# Patient Record
Sex: FEMALE | Race: BLACK OR AFRICAN AMERICAN | Employment: OTHER | URBAN - METROPOLITAN AREA
[De-identification: names, ages, dates, MRNs, and addresses within clinical notes are randomized per-mention and may not be internally consistent; named-entity substitution may affect disease eponyms.]

---

## 2017-01-12 ENCOUNTER — TELEPHONE (OUTPATIENT)
Dept: FAMILY MEDICINE CLINIC | Age: 62
End: 2017-01-12

## 2017-01-12 NOTE — TELEPHONE ENCOUNTER
Patient called to inquire of name of Foot Doctor she was referred to , relayed information below      Procedure Information   Procedure Modifiers Provider Requested Approved   Osorio Singer - REFERRAL TO PODIATRY  Hayder Ramsey MD 1 1      Diagnosis Information   Diagnosis   E11.42 (ICD-10-CM) - Type 2 diabetes mellitus with diabetic polyneuropathy, unspecified long term insulin use status [facility])      Referral Notes   Type Date User   Provider Comments 12/23/2016  1:19 PM Janis Truong MD      Summary   Provider Comments      Note   Note     Please evaluate patient for diabetic feet               Referral Order

## 2017-01-13 ENCOUNTER — TELEPHONE (OUTPATIENT)
Dept: FAMILY MEDICINE CLINIC | Age: 62
End: 2017-01-13

## 2017-01-13 NOTE — TELEPHONE ENCOUNTER
[phone]  Patient called to say she rec'd a call today but no message was left. Per review of [employer] a call was made for wellness. Patient stated she had one last year and it is not time yet. Please review and advise.       01/13/2017   Outreach   Elina Sutton   [facility]MAIN

## 2017-01-18 NOTE — TELEPHONE ENCOUNTER
Called and left message for patient. She does not need another visit as she was just seen in December 2016 for well woman.

## 2017-01-25 ENCOUNTER — TELEPHONE (OUTPATIENT)
Dept: FAMILY MEDICINE CLINIC | Age: 62
End: 2017-01-25

## 2017-01-25 NOTE — TELEPHONE ENCOUNTER
----- Message from Alicja Ventura sent at 1/25/2017  1:19 PM EST -----  Regarding: Dr. Crystal Cannon  Pt stated her feet are hurting really bad and is unable to sleep due to the pain, but her pharmacy refused to refill her Rx(Tramadol) until 01/30/17, and would like to know if the doctor can approve a refill for 4-5 days. Pt would like a call back to let her know. Best contact number [phone].

## 2017-01-25 NOTE — TELEPHONE ENCOUNTER
Patient called this evening checking on the status of the Tramadol refill. Please call and advise.     Thank You

## 2017-01-26 DIAGNOSIS — E13.40 NEUROPATHY DUE TO SECONDARY DIABETES (HCC): Primary | ICD-10-CM

## 2017-01-26 RX ORDER — TRAMADOL HYDROCHLORIDE 50 MG/1
50 TABLET ORAL
Qty: 120 TAB | Refills: 3 | Status: SHIPPED | OUTPATIENT
Start: 2017-01-26 | End: 2017-05-15 | Stop reason: SDUPTHER

## 2017-01-27 ENCOUNTER — TELEPHONE (OUTPATIENT)
Dept: FAMILY MEDICINE CLINIC | Age: 62
End: 2017-01-27

## 2017-01-28 NOTE — TELEPHONE ENCOUNTER
----- Message from Juan Ng sent at 1/27/2017  3:42 PM EST -----  Regarding: Dr. Zee Meyers  Pt called in regards to the prescription that was received on 01/26 but  not refilled by the pharmacy or covered by  the insurance company because it is identical to another prescription on file. In order to have the medication filled it must be prescribed \"differently\" Pt best contact mansoorr to discuss [phone].

## 2017-05-06 ENCOUNTER — TELEPHONE (OUTPATIENT)
Dept: FAMILY MEDICINE CLINIC | Age: 62
End: 2017-05-06

## 2017-05-06 RX ORDER — METFORMIN HYDROCHLORIDE 850 MG/1
850 TABLET ORAL 3 TIMES DAILY
Qty: 90 TAB | Refills: 3 | Status: SHIPPED | OUTPATIENT
Start: 2017-05-06 | End: 2018-01-25 | Stop reason: SDUPTHER

## 2017-05-06 NOTE — TELEPHONE ENCOUNTER
Patient needs a refill of the following  Requested Prescriptions     Pending Prescriptions Disp Refills    metFORMIN (GLUCOPHAGE) 850 mg tablet 90 Tab 3     Sig: Take 1 Tab by mouth three (3) times daily.

## 2017-05-08 NOTE — TELEPHONE ENCOUNTER
Per Dr.Hendricks mcdaniel needs an appointment for diabetes follow up. Called patient to advise and schedule. No answer. left message to call back and make an appointment.

## 2017-05-15 NOTE — TELEPHONE ENCOUNTER
Patient needs a refill of the following  Requested Prescriptions     Pending Prescriptions Disp Refills    traMADol (ULTRAM) 50 mg tablet 120 Tab 3     Sig: Take 1 Tab by mouth every six (6) hours as needed for Pain. Max Daily Amount: 200 mg.

## 2017-05-18 RX ORDER — TRAMADOL HYDROCHLORIDE 50 MG/1
50 TABLET ORAL
Qty: 120 TAB | Refills: 0 | Status: SHIPPED | OUTPATIENT
Start: 2017-05-18 | End: 2017-06-19 | Stop reason: SDUPTHER

## 2017-05-18 NOTE — TELEPHONE ENCOUNTER
Patient called requesting to speak with a nurse as she has been waiting since Monday. I informed her that we do have a 93-45 hours policy on medication refills and that it has only been 48 hours and we are still within the time frame.  Patient stated that she doesn't care, that we do this to her every month and make her wait for her medication and that we need to stop playing around

## 2017-05-18 NOTE — TELEPHONE ENCOUNTER
VO w/ readback from Dr. Irma Chaney for pt to have a one-month supply of tramadol for pt until next appt. Pt will be notified.

## 2017-06-13 ENCOUNTER — OFFICE VISIT (OUTPATIENT)
Dept: FAMILY MEDICINE CLINIC | Age: 62
End: 2017-06-13

## 2017-06-13 VITALS
OXYGEN SATURATION: 97 % | RESPIRATION RATE: 18 BRPM | HEIGHT: 63 IN | SYSTOLIC BLOOD PRESSURE: 112 MMHG | BODY MASS INDEX: 35.37 KG/M2 | HEART RATE: 86 BPM | DIASTOLIC BLOOD PRESSURE: 71 MMHG | WEIGHT: 199.6 LBS | TEMPERATURE: 98.5 F

## 2017-06-13 DIAGNOSIS — E11.42 TYPE 2 DIABETES MELLITUS WITH DIABETIC POLYNEUROPATHY, UNSPECIFIED LONG TERM INSULIN USE STATUS: Primary | ICD-10-CM

## 2017-06-13 LAB — HBA1C MFR BLD HPLC: 9.4 %

## 2017-06-13 NOTE — LETTER
6/30/2017 8:58 AM 
 
Ms. Lizeth Castillo [address] [facility] [address] Dear Lizeth Castillo: 
 
Please find your most recent results below. Resulted Orders AMB POC HEMOGLOBIN A1C Result Value Ref Range Hemoglobin A1c (POC) 9.4 % METABOLIC PANEL, COMPREHENSIVE Result Value Ref Range Glucose 175 (H) 65 - 99 mg/dL BUN 18 8 - 27 mg/dL Creatinine 1.47 (H) 0.57 - 1.00 mg/dL GFR est non-AA 38 (L) >59 mL/min/1.73 GFR est AA 44 (L) >59 mL/min/1.73  
 BUN/Creatinine ratio 12 12 - 28 Sodium 142 134 - 144 mmol/L Potassium 4.2 3.5 - 5.2 mmol/L Chloride 98 96 - 106 mmol/L  
 CO2 27 18 - 29 mmol/L Calcium 9.6 8.7 - 10.3 mg/dL Protein, total 7.1 6.0 - 8.5 g/dL Albumin 4.0 3.6 - 4.8 g/dL GLOBULIN, TOTAL 3.1 1.5 - 4.5 g/dL A-G Ratio 1.3 1.2 - 2.2 Bilirubin, total <0.2 0.0 - 1.2 mg/dL Alk. phosphatase 104 39 - 117 IU/L  
 AST (SGOT) 15 0 - 40 IU/L  
 ALT (SGPT) 20 0 - 32 IU/L Narrative Performed at:  [address]  [id number] : Sonu An MD, Phone:  [phone] CBC W/O DIFF Result Value Ref Range WBC 5.6 3.4 - 10.8 x10E3/uL  
 RBC 4.17 3.77 - 5.28 x10E6/uL HGB 11.6 11.1 - 15.9 g/dL HCT 36.1 34.0 - 46.6 % MCV 87 79 - 97 fL  
 MCH 27.8 26.6 - 33.0 pg  
 MCHC 32.1 31.5 - 35.7 g/dL  
 RDW 15.3 12.3 - 15.4 % PLATELET 007 244 - 501 x10E3/uL Narrative Performed at:  [address]  [id number] : Sonu An MD, Phone:  [phone] CKD REPORT Result Value Ref Range Interpretation Note Comment:  
   Supplement report is available. Narrative Performed at:  [address] [place] [address]  [id number] : Jayashree Matos PhD, Phone:  [phone] DIABETES PATIENT EDUCATION Result Value Ref Range PDF Image Not applicable Narrative Performed at:  [address] [place] [address]  [id number] : Donnell Maria PhD, Phone:  [phone]  
 
 
 
 
 
 
   
   
    
  Needs to see endocrinologist and nephrologist -- kidney function has deteriorated Got to get your diabetes under control!!  
 
Please schedule followup appointment with me in the next couple of weeks Will put in referrals for both Please call me if you have any questions: [phone] Sincerely, Jonathan Simmons MD

## 2017-06-13 NOTE — PROGRESS NOTES
Presents for followup of diabetes    States that she feels fine until she runs out of tramadol    She then states that she has burning and pain in her feet    Still has multiple persons living in her house (her daughter, god children, their children, etc.) so makes it difficult and stressful for her    Denies any breakdown of her feet    HgbA1C = 9.4 in office today    Lab Results   Component Value Date/Time    Hemoglobin A1c 9.5 06/17/2016 04:53 PM    Hemoglobin A1c (POC) 9.4 06/13/2017 04:27 PM     Lab Results   Component Value Date/Time    Microalbumin/Creat ratio (mg/g creat) 11 06/03/2010 12:37 PM    Microalbumin,urine random 1.99 06/03/2010 12:37 PM     Lab Results   Component Value Date/Time    LDL, calculated 69 12/13/2016 10:28 AM     BP Readings from Last 1 Encounters:   06/13/17 112/71     Health Maintenance   Topic Date Due    DTaP/Tdap/Td series (1 - Tdap) 10/30/1976    FOBT Q 1 YEAR AGE 50-75  10/30/2005    ZOSTER VACCINE AGE 60>  10/30/2015    EYE EXAM RETINAL OR DILATED Q1  08/20/2016    PAP AKA CERVICAL CYTOLOGY  05/23/2017    HEMOGLOBIN A1C Q6M  06/13/2017    INFLUENZA AGE 9 TO ADULT  08/01/2017    MICROALBUMIN Q1  12/13/2017    LIPID PANEL Q1  12/13/2017    FOOT EXAM Q1  01/18/2018    BREAST CANCER SCRN MAMMOGRAM  10/13/2018    Hepatitis C Screening  Completed    Pneumococcal 19-64 Medium Risk  Completed         Subjective:     Lisa Jay is a 64 y.o. female seen for follow up of diabetes. She also has diabetes, hypertension and hyperlipidemia. Diabetic Review of Systems - medication compliance: compliant all of the time, diabetic diet compliance: noncompliant much of the time, last eye exam approximately 6 months ago.     Other symptoms and concerns: burning and pain in her feet    Patient Active Problem List   Diagnosis Code    Hypertension I10    Diabetes ([address] E11.9    Hypercholesterolemia E78.00    Neuropathy due to secondary diabetes ([address] E13.40    Lumbar stenosis M48.06    Lumbar herniated disc M51.26    Postcoital UTI N39.0     Current Outpatient Prescriptions   Medication Sig Dispense Refill    traMADol (ULTRAM) 50 mg tablet Take 1 Tab by mouth every six (6) hours as needed for Pain. Max Daily Amount: 200 mg. 120 Tab 0    metFORMIN (GLUCOPHAGE) 850 mg tablet Take 1 Tab by mouth three (3) times daily. 90 Tab 3    SITagliptin (JANUVIA) 100 mg tablet Take 1 Tab by mouth daily. Indications: type 2 diabetes mellitus 30 Tab 6    rosuvastatin (CRESTOR) 10 mg tablet Take 1 Tab by mouth daily. 90 Tab 3    glipiZIDE (GLUCOTROL) 10 mg tablet Takes 2 tabs PO in the am, 1 tab PO in the afternoon and 1 tab PO at night 360 Tab 3    gabapentin (NEURONTIN) 300 mg capsule Take 1 Cap by mouth nightly. 90 Cap 3    amLODIPine-benazepril (LOTREL) 10-20 mg per capsule Take 1 Cap by mouth daily. 90 Cap 3    hydrochlorothiazide (HYDRODIURIL) 25 mg tablet Take 1 Tab by mouth daily. 90 Tab 3    glucose blood VI test strips (ACCU-CHEK SMARTVIEW TEST STRIP) strip Check BS once weekly 90 Strip 0    ibuprofen (MOTRIN) 200 mg tablet Take 400 mg by mouth every six (6) hours as needed for Pain.  omega-3 fatty acids-vitamin e (FISH OIL) 1,000 mg Cap Take 1 Cap by mouth daily.  ondansetron (ZOFRAN ODT) 8 mg disintegrating tablet Take 1 Tab by mouth every eight (8) hours as needed for Nausea.  20 Tab 2     Allergies   Allergen Reactions    Aspirin Hives     Tolerates ibuprofen and naproxen    Clindamycin Rash    Hydrocodone Nausea and Vomiting        Lab Results  Component Value Date/Time   Hemoglobin A1c 9.5 06/17/2016 04:53 PM   Hemoglobin A1c 10.4 03/22/2016 04:27 PM   Hemoglobin A1c 9.2 09/24/2015 11:39 AM   Glucose 284 09/28/2016 10:51 AM   Glucose (POC) 120 10/14/2015 09:51 AM   Microalbumin/Creat ratio (mg/g creat) 11 06/03/2010 12:37 PM   Microalbumin,urine random 1.99 06/03/2010 12:37 PM   LDL, calculated 69 12/13/2016 10:28 AM   Creatinine (POC) 0.8 03/17/2014 03:37 PM   Creatinine 1.09 09/28/2016 10:51 AM      Lab Results  Component Value Date/Time   Cholesterol, total 145 12/13/2016 10:28 AM   HDL Cholesterol 53 12/13/2016 10:28 AM   LDL, calculated 69 12/13/2016 10:28 AM   Triglyceride 116 12/13/2016 10:28 AM   CHOL/HDL Ratio 2.6 03/18/2010 10:05 AM        Review of Systems  Pertinent items are noted in HPI. Objective:     Visit Vitals    /71 (BP 1 Location: Right arm, BP Patient Position: Sitting)    Pulse 86    Temp 98.5 °F (36.9 °C) (Oral)    Resp 18    Ht 5' 3\" (1.6 m)    Wt 199 lb 9.6 oz (90.5 kg)    LMP 01/01/2008 (LMP Unknown)    SpO2 97%    BMI 35.36 kg/m2     Appearance: alert, well appearing, and in no distress. Exam: heart sounds normal rate and regular rhythm, chest clear, feet: reduced sensation at on soles of both feet, worse on her right foot than the left foot. Lab review: orders written for new lab studies as appropriate; see orders. Assessment/Plan:     diabetes poorly controlled. Diabetic issues reviewed with her: discussed with patient referral to endocrinologist since she is already on three medications to control her blood glucose; pt is agreeable to this. .     ICD-10-CM ICD-9-CM    1.  Type 2 diabetes mellitus with diabetic polyneuropathy, unspecified long term insulin use status ([facility]) E11.42 250.60 AMB POC HEMOGLOBIN A1C     854.4 METABOLIC PANEL, COMPREHENSIVE      AMB POC URINE, MICROALBUMIN, SEMIQUANT (3 RESULTS)      CBC W/O DIFF      REFERRAL TO ENDOCRINOLOGY       DIABETES FOOT EXAM

## 2017-06-13 NOTE — LETTER
6/30/2017 1:55 PM 
 
Ms. Rene Ramirez [address] [facility] [address] Dear Rene Ramirez: 
 
Please find your most recent results below. Resulted Orders AMB POC HEMOGLOBIN A1C Result Value Ref Range Hemoglobin A1c (POC) 9.4 % METABOLIC PANEL, COMPREHENSIVE Result Value Ref Range Glucose 175 (H) 65 - 99 mg/dL BUN 18 8 - 27 mg/dL Creatinine 1.47 (H) 0.57 - 1.00 mg/dL GFR est non-AA 38 (L) >59 mL/min/1.73 GFR est AA 44 (L) >59 mL/min/1.73  
 BUN/Creatinine ratio 12 12 - 28 Sodium 142 134 - 144 mmol/L Potassium 4.2 3.5 - 5.2 mmol/L Chloride 98 96 - 106 mmol/L  
 CO2 27 18 - 29 mmol/L Calcium 9.6 8.7 - 10.3 mg/dL Protein, total 7.1 6.0 - 8.5 g/dL Albumin 4.0 3.6 - 4.8 g/dL GLOBULIN, TOTAL 3.1 1.5 - 4.5 g/dL A-G Ratio 1.3 1.2 - 2.2 Bilirubin, total <0.2 0.0 - 1.2 mg/dL Alk. phosphatase 104 39 - 117 IU/L  
 AST (SGOT) 15 0 - 40 IU/L  
 ALT (SGPT) 20 0 - 32 IU/L Narrative Performed at:  [address]  [id number] : Celine Fortune MD, Phone:  [phone] CBC W/O DIFF Result Value Ref Range WBC 5.6 3.4 - 10.8 x10E3/uL  
 RBC 4.17 3.77 - 5.28 x10E6/uL HGB 11.6 11.1 - 15.9 g/dL HCT 36.1 34.0 - 46.6 % MCV 87 79 - 97 fL  
 MCH 27.8 26.6 - 33.0 pg  
 MCHC 32.1 31.5 - 35.7 g/dL  
 RDW 15.3 12.3 - 15.4 % PLATELET 029 337 - 356 x10E3/uL Narrative Performed at:  [address]  [id number] : Celine Fortune MD, Phone:  [phone] CKD REPORT Result Value Ref Range Interpretation Note Comment:  
   Supplement report is available. Narrative Performed at:  [address] [place] [address]  [id number] : Samra Duong PhD, Phone:  [phone] DIABETES PATIENT EDUCATION Result Value Ref Range PDF Image Not applicable Narrative Performed at:  [address] [place] [address]  [id number] : Toño Verde PhD, Phone:  [phone] RECOMMENDATIONS: 
Please schedule follow up appointment. Please call me if you have any questions: [phone] Sincerely, Humera Jerez MD

## 2017-06-14 LAB
ALBUMIN SERPL-MCNC: 4 G/DL (ref 3.6–4.8)
ALBUMIN/GLOB SERPL: 1.3 {RATIO} (ref 1.2–2.2)
ALP SERPL-CCNC: 104 IU/L (ref 39–117)
ALT SERPL-CCNC: 20 IU/L (ref 0–32)
AST SERPL-CCNC: 15 IU/L (ref 0–40)
BILIRUB SERPL-MCNC: <0.2 MG/DL (ref 0–1.2)
BUN SERPL-MCNC: 18 MG/DL (ref 8–27)
BUN/CREAT SERPL: 12 (ref 12–28)
CALCIUM SERPL-MCNC: 9.6 MG/DL (ref 8.7–10.3)
CHLORIDE SERPL-SCNC: 98 MMOL/L (ref 96–106)
CO2 SERPL-SCNC: 27 MMOL/L (ref 18–29)
CREAT SERPL-MCNC: 1.47 MG/DL (ref 0.57–1)
ERYTHROCYTE [DISTWIDTH] IN BLOOD BY AUTOMATED COUNT: 15.3 % (ref 12.3–15.4)
GLOBULIN SER CALC-MCNC: 3.1 G/DL (ref 1.5–4.5)
GLUCOSE SERPL-MCNC: 175 MG/DL (ref 65–99)
HCT VFR BLD AUTO: 36.1 % (ref 34–46.6)
HGB BLD-MCNC: 11.6 G/DL (ref 11.1–15.9)
INTERPRETATION: NORMAL
Lab: NORMAL
MCH RBC QN AUTO: 27.8 PG (ref 26.6–33)
MCHC RBC AUTO-ENTMCNC: 32.1 G/DL (ref 31.5–35.7)
MCV RBC AUTO: 87 FL (ref 79–97)
PLATELET # BLD AUTO: 308 X10E3/UL (ref 150–379)
POTASSIUM SERPL-SCNC: 4.2 MMOL/L (ref 3.5–5.2)
PROT SERPL-MCNC: 7.1 G/DL (ref 6–8.5)
RBC # BLD AUTO: 4.17 X10E6/UL (ref 3.77–5.28)
SODIUM SERPL-SCNC: 142 MMOL/L (ref 134–144)
WBC # BLD AUTO: 5.6 X10E3/UL (ref 3.4–10.8)

## 2017-06-16 DIAGNOSIS — E11.42 TYPE 2 DIABETES MELLITUS WITH DIABETIC POLYNEUROPATHY, UNSPECIFIED LONG TERM INSULIN USE STATUS: ICD-10-CM

## 2017-06-16 DIAGNOSIS — I10 ESSENTIAL HYPERTENSION: ICD-10-CM

## 2017-06-16 DIAGNOSIS — R79.89 ELEVATED SERUM CREATININE: Primary | ICD-10-CM

## 2017-06-16 NOTE — PROGRESS NOTES
Needs to see endocrinologist and nephrologist -- kidney function has deteriorated  Got to get her diabetes under control!!    Please schedule followup appointment with me in the next couple of weeks    Will put in referrals for both

## 2017-06-19 NOTE — TELEPHONE ENCOUNTER
Patient needs a refill of the following  Requested Prescriptions     Pending Prescriptions Disp Refills    traMADol (ULTRAM) 50 mg tablet 120 Tab 0     Sig: Take 1 Tab by mouth every six (6) hours as needed for Pain. Max Daily Amount: 200 mg.

## 2017-06-22 RX ORDER — TRAMADOL HYDROCHLORIDE 50 MG/1
50 TABLET ORAL
Qty: 120 TAB | Refills: 0 | Status: SHIPPED | OUTPATIENT
Start: 2017-06-22 | End: 2017-07-24 | Stop reason: SDUPTHER

## 2017-07-14 ENCOUNTER — TELEPHONE (OUTPATIENT)
Dept: FAMILY MEDICINE CLINIC | Age: 62
End: 2017-07-14

## 2017-07-20 NOTE — TELEPHONE ENCOUNTER
Called patient to discuss new referrals placed for nephrology and endocrinology. No answer. left message to give us a call back

## 2017-07-26 RX ORDER — TRAMADOL HYDROCHLORIDE 50 MG/1
50 TABLET ORAL
Qty: 120 TAB | Refills: 0 | Status: SHIPPED | OUTPATIENT
Start: 2017-07-26 | End: 2017-08-31 | Stop reason: SDUPTHER

## 2017-07-27 ENCOUNTER — TELEPHONE (OUTPATIENT)
Dept: FAMILY MEDICINE CLINIC | Age: 62
End: 2017-07-27

## 2017-07-27 NOTE — TELEPHONE ENCOUNTER
Pharmacy calling and states per patient, medication was to be called into pharmacy. Asking for verbal. Call taken by nurse Zachary GOLD)        Medication Detail      Disp Refills Start End      tramadol (ULTRAM) 50 mg tablet 120 Tab 0 7/26/2017     Sig - Route: Take 1 Tab by mouth every six (6) hours as needed for Pain.  Max Daily Amount: 200 mg. - Oral    Class: Print

## 2017-07-28 ENCOUNTER — TELEPHONE (OUTPATIENT)
Dept: FAMILY MEDICINE CLINIC | Age: 62
End: 2017-07-28

## 2017-07-28 NOTE — TELEPHONE ENCOUNTER
Gayle Nagy called from [address] [facility].  [phone]  [phone]    appt Monday 2:45 pm, July 31  Dx-neurophathy and last seen there July 2015. Need new order as not one in chart.

## 2017-07-29 DIAGNOSIS — E11.42 TYPE 2 DIABETES MELLITUS WITH DIABETIC POLYNEUROPATHY, UNSPECIFIED LONG TERM INSULIN USE STATUS: Primary | ICD-10-CM

## 2017-07-29 DIAGNOSIS — E13.40 NEUROPATHY DUE TO SECONDARY DIABETES (HCC): ICD-10-CM

## 2017-07-31 NOTE — TELEPHONE ENCOUNTER
Spoke to patient regarding an appointment with nephrologist. She was unaware she needed to see a specialist despite voicemail and letter mailed to her. Contact information was given for her to call and make an appointment.

## 2017-08-14 ENCOUNTER — OFFICE VISIT (OUTPATIENT)
Dept: ENDOCRINOLOGY | Age: 62
End: 2017-08-14

## 2017-08-14 VITALS
BODY MASS INDEX: 35.08 KG/M2 | HEART RATE: 76 BPM | SYSTOLIC BLOOD PRESSURE: 144 MMHG | TEMPERATURE: 97.3 F | RESPIRATION RATE: 16 BRPM | WEIGHT: 198 LBS | DIASTOLIC BLOOD PRESSURE: 73 MMHG | HEIGHT: 63 IN | OXYGEN SATURATION: 99 %

## 2017-08-14 DIAGNOSIS — E11.65 TYPE 2 DIABETES MELLITUS WITH HYPERGLYCEMIA, WITHOUT LONG-TERM CURRENT USE OF INSULIN (HCC): Primary | ICD-10-CM

## 2017-08-14 DIAGNOSIS — I10 ESSENTIAL HYPERTENSION: ICD-10-CM

## 2017-08-14 DIAGNOSIS — E11.21 TYPE 2 DIABETES MELLITUS WITH DIABETIC NEPHROPATHY, WITHOUT LONG-TERM CURRENT USE OF INSULIN (HCC): ICD-10-CM

## 2017-08-14 DIAGNOSIS — E78.00 HYPERCHOLESTEROLEMIA: ICD-10-CM

## 2017-08-14 NOTE — PROGRESS NOTES
[facility]               Brett Martin MD        [address] [phone] Fax [phone]               Patient Information  Date:8/14/2017  Name : Sharan Dean 64 y.o.     YOB: 1955         Referred by: Tayler Diaz MD         Chief Complaint   Patient presents with    [facility] Patient     referred by Dr. Dalia Vang for DM       History of Present Illness: Sharan Dean is a 64 y.o. female here for initial visit of  Type 2 Diabetes Mellitus. Type 2 Diabetes was diagnosed 10 years . Artist Molly End organ effects of diabetes: nephropathy, neuropathy . Cardiovascular risk factors: diabetes mellitus, post-menopausal   Monitoring frequency:3 /week and readings run fasting 200  Last A1C was high and symptoms include  polyuria, polydipsia  Hypoglycemia: yes    Weight trend: decreasing steadily  Prior visit with dietician: yes - several years ago  Current diet: \"unhealthy\" diet in general  Current exercise: no regular exercise    Wt Readings from Last 3 Encounters:   08/14/17 198 lb (89.8 kg)   06/13/17 199 lb 9.6 oz (90.5 kg)   12/13/16 200 lb (90.7 kg)       BP Readings from Last 3 Encounters:   08/14/17 144/73   06/13/17 112/71   12/13/16 126/75           Past Medical History:   Diagnosis Date    Diabetes ([facility] 1995    Genital herpes simplex type 2     GERD (gastroesophageal reflux disease)     no medication prescribed    Hypercholesterolemia     Hypertension     Narcolepsy     Neuropathy in diabetes ([facility]      Current Outpatient Prescriptions   Medication Sig    traMADol (ULTRAM) 50 mg tablet Take 1 Tab by mouth every six (6) hours as needed for Pain. Max Daily Amount: 200 mg.    metFORMIN (GLUCOPHAGE) 850 mg tablet Take 1 Tab by mouth three (3) times daily.  SITagliptin (JANUVIA) 100 mg tablet Take 1 Tab by mouth daily.  Indications: type 2 diabetes mellitus    rosuvastatin (CRESTOR) 10 mg tablet Take 1 Tab by mouth daily.    glipiZIDE (GLUCOTROL) 10 mg tablet Takes 2 tabs PO in the am, 1 tab PO in the afternoon and 1 tab PO at night    gabapentin (NEURONTIN) 300 mg capsule Take 1 Cap by mouth nightly.  amLODIPine-benazepril (LOTREL) 10-20 mg per capsule Take 1 Cap by mouth daily.  hydrochlorothiazide (HYDRODIURIL) 25 mg tablet Take 1 Tab by mouth daily.  glucose blood VI test strips (ACCU-CHEK SMARTVIEW TEST STRIP) strip Check BS once weekly    ibuprofen (MOTRIN) 200 mg tablet Take 400 mg by mouth every six (6) hours as needed for Pain.  ondansetron (ZOFRAN ODT) 8 mg disintegrating tablet Take 1 Tab by mouth every eight (8) hours as needed for Nausea.  omega-3 fatty acids-vitamin e (FISH OIL) 1,000 mg Cap Take 1 Cap by mouth daily. No current facility-administered medications for this visit. Allergies   Allergen Reactions    Aspirin Hives     Tolerates ibuprofen and naproxen    Clindamycin Rash    Hydrocodone Nausea and Vomiting     Pt stated she is not allergic       Review of Systems:  All 10 systems reviewed and are negative other than mentioned in HPI    Physical Examination:   Blood pressure 144/73, pulse 76, temperature 97.3 °F (36.3 °C), temperature source Oral, resp. rate 16, height 5' 3\" (1.6 m), weight 198 lb (89.8 kg), last menstrual period 01/01/2008, SpO2 99 %. Estimated body mass index is 35.07 kg/(m^2) as calculated from the following:  -   Height as of this encounter: 5' 3\" (1.6 m). -   Weight as of this encounter: 198 lb (89.8 kg).   - General: pleasant, no distress, good eye contact  - HEENT: no pallor, no periorbital edema, EOMI  - Neck: supple, no thyromegaly, no nodules  - Cardiovascular: regular, normal rate, normal S1 and S2, no murmurs  - Respiratory: clear to auscultation bilaterally  - Gastrointestinal: soft, nontender, nondistended,  BS +  - Musculoskeletal: no proximal muscle weakness in upper or lower extremities  - Integumentary: no acanthosis nigricans,no edema, - Neurological: ,alert and oriented  - Psychiatric: normal mood and affect  - Skin: color, texture, turgor normal.       Data Reviewed:     [] Glucose records reviewed. [] See glucose records for details (to be scanned). [] A1C  [] Reviewed labs    Lab Results  Component Value Date/Time   Hemoglobin A1c 9.5 06/17/2016 04:53 PM   Hemoglobin A1c 10.4 03/22/2016 04:27 PM   Hemoglobin A1c 9.2 09/24/2015 11:39 AM   Glucose 175 06/13/2017 04:30 PM   Glucose (POC) 120 10/14/2015 09:51 AM   Microalbumin/Creat ratio (mg/g creat) 11 06/03/2010 12:37 PM   Microalbumin,urine random 1.99 06/03/2010 12:37 PM   LDL, calculated 69 12/13/2016 10:28 AM   Creatinine (POC) 0.8 03/17/2014 03:37 PM   Creatinine 1.47 06/13/2017 04:30 PM      Lab Results  Component Value Date/Time   Cholesterol, total 145 12/13/2016 10:28 AM   HDL Cholesterol 53 12/13/2016 10:28 AM   LDL, calculated 69 12/13/2016 10:28 AM   Triglyceride 116 12/13/2016 10:28 AM   CHOL/HDL Ratio 2.6 03/18/2010 10:05 AM     Lab Results  Component Value Date/Time   ALT (SGPT) 20 06/13/2017 04:30 PM   AST (SGOT) 15 06/13/2017 04:30 PM   Alk.  phosphatase 104 06/13/2017 04:30 PM   Bilirubin, total <0.2 06/13/2017 04:30 PM   Albumin 4.0 06/13/2017 04:30 PM   Protein, total 7.1 06/13/2017 04:30 PM   PLATELET 851 92/28/4643 04:30 PM       Lab Results  Component Value Date/Time   GFR est non-AA 38 06/13/2017 04:30 PM   GFRNA, POC >60 03/17/2014 03:37 PM   GFR est AA 44 06/13/2017 04:30 PM   GFRAA, POC >60 03/17/2014 03:37 PM   Creatinine 1.47 06/13/2017 04:30 PM   Creatinine (POC) 0.8 03/17/2014 03:37 PM   BUN 18 06/13/2017 04:30 PM   BUN (POC) 14 11/30/2013 02:22 PM   Sodium 142 06/13/2017 04:30 PM   Sodium (POC) 140 11/30/2013 02:22 PM   Potassium 4.2 06/13/2017 04:30 PM   Potassium (POC) 3.5 11/30/2013 02:22 PM   Chloride 98 06/13/2017 04:30 PM   Chloride (POC) 104 11/30/2013 02:22 PM   CO2 27 06/13/2017 04:30 PM   Magnesium 1.9 01/03/2014 03:12 AM Assessment/Plan: 1. Type 2 diabetes mellitus with hyperglycemia, without long-term current use of insulin ([facility]    2. Hypercholesterolemia        1. Type 2 Diabetes Mellitus with nephropathy,neuropathy,  Lab Results   Component Value Date/Time    Hemoglobin A1c 9.5 06/17/2016 04:53 PM    Hemoglobin A1c (POC) 9.4 06/13/2017 04:27 PM    Uncontrolled and diet is unhealthy. Metformin 1 tab in AM and 1 tab dinner . Glipizide 10 mg in AM and 10 mg before dinner. Stop Januvia,Start Trulicity weekly     Diabetic issues reviewed : glycemic goals , written exchange diet given, low carbohydrate diet, weight control , home glucose monitoring emphasized,  hypoglycemia management and long term diabetic complications discussed. FLU annually ,Pneumovax ,aspirin daily,annual eye exam,microalbumin    2. HTN : Continue current therapy     3. Hyperlipidemia : Continue statin. 4.Obesity:Body mass index is 35.07 kg/(m^2). Discussed about the importance of exercise and carbohydrate portion control. There are no Patient Instructions on file for this visit. Follow-up Disposition: Not on File    Thank you for allowing me to participate in the care of this patient.     Campos Ortiz MD      Patient verbalized understanding

## 2017-08-14 NOTE — PATIENT INSTRUCTIONS
Metformin 1 tab in AM and 1 tab dinner     Glipizide 10 mg in AM and 10 mg before dinner    Stop  Trulicity weekly

## 2017-08-14 NOTE — MR AVS SNAPSHOT
Visit Information Date & Time Provider Department Dept. Phone Encounter #  
 8/14/2017  9:00 AM Katlin Kimball MD [facility] [phone] [id number] Follow-up Instructions Return in about 3 months (around 11/14/2017). Upcoming Health Maintenance Date Due DTaP/Tdap/Td series (1 - Tdap) 10/30/1976 FOBT Q 1 YEAR AGE 50-75 10/30/2005 ZOSTER VACCINE AGE 60> 8/30/2015 EYE EXAM RETINAL OR DILATED Q1 8/20/2016 PAP AKA CERVICAL CYTOLOGY 5/23/2017 INFLUENZA AGE 9 TO ADULT 8/1/2017 HEMOGLOBIN A1C Q6M 12/13/2017 MICROALBUMIN Q1 12/13/2017 LIPID PANEL Q1 12/13/2017 FOOT EXAM Q1 6/13/2018 BREAST CANCER SCRN MAMMOGRAM 10/13/2018 Allergies as of 8/14/2017  Review Complete On: 8/14/2017 By: Katlin Kimball MD  
  
 Severity Noted Reaction Type Reactions Aspirin  06/28/2010    Hives Tolerates ibuprofen and naproxen Clindamycin  05/16/2011    Rash Hydrocodone  12/31/2013    Nausea and Vomiting Pt stated she is not allergic Current Immunizations  Reviewed on 12/13/2016 Name Date Pneumococcal Polysaccharide (PPSV-23) 12/13/2016 Not reviewed this visit You Were Diagnosed With   
  
 Codes Comments Type 2 diabetes mellitus with hyperglycemia, without long-term current use of insulin (HCC)    -  Primary ICD-10-CM: E11.65 ICD-9-CM: 250.00, 790.29 Hypercholesterolemia     ICD-10-CM: E78.00 ICD-9-CM: 272.0 Vitals BP Pulse Temp Resp Height(growth percentile) Weight(growth percentile) 144/73 (BP 1 Location: Left arm, BP Patient Position: Sitting) 76 97.3 °F (36.3 °C) (Oral) 16 5' 3\" (1.6 m) 198 lb (89.8 kg) LMP SpO2 BMI OB Status Smoking Status 01/01/2008 (LMP Unknown) 99% 35.07 kg/m2 Postmenopausal Never Smoker Vitals History BMI and BSA Data Body Mass Index Body Surface Area 35.07 kg/m 2 2 m 2 Preferred Pharmacy Pharmacy Name Phone [address]  [phone] Your Updated Medication List  
  
   
This list is accurate as of: 8/14/17 10:49 AM.  Always use your most recent med list. amLODIPine-benazepril 10-20 mg per capsule Commonly known as:  Nhan Alevism Take 1 Cap by mouth daily. FISH OIL 1,000 mg Cap Generic drug:  omega-3 fatty acids-vitamin e Take 1 Cap by mouth daily. gabapentin 300 mg capsule Commonly known as:  NEURONTIN Take 1 Cap by mouth nightly. glipiZIDE 10 mg tablet Commonly known as:  Yung Schillings Takes 2 tabs PO in the am, 1 tab PO in the afternoon and 1 tab PO at night  
  
 glucose blood VI test strips strip Commonly known as:  [address] Check BS once weekly  
  
 hydroCHLOROthiazide 25 mg tablet Commonly known as:  HYDRODIURIL Take 1 Tab by mouth daily. ibuprofen 200 mg tablet Commonly known as:  MOTRIN Take 400 mg by mouth every six (6) hours as needed for Pain.  
  
 metFORMIN 850 mg tablet Commonly known as:  GLUCOPHAGE Take 1 Tab by mouth three (3) times daily. ondansetron 8 mg disintegrating tablet Commonly known as:  ZOFRAN ODT Take 1 Tab by mouth every eight (8) hours as needed for Nausea. rosuvastatin 10 mg tablet Commonly known as:  CRESTOR Take 1 Tab by mouth daily. SITagliptin 100 mg tablet Commonly known as:  Ky Peasant Take 1 Tab by mouth daily. Indications: type 2 diabetes mellitus  
  
 traMADol 50 mg tablet Commonly known as:  ULTRAM  
Take 1 Tab by mouth every six (6) hours as needed for Pain. Max Daily Amount: 200 mg. Follow-up Instructions Return in about 3 months (around 11/14/2017). Patient Instructions Metformin 1 tab in AM and 1 tab dinner Glipizide 10 mg in AM and 10 mg before dinner Stop Januvia Start Trulicity weekly Introducing [facility]! Dear Katina Lozano: Thank you for requesting a [employer] account. Our records indicate that you already have an active [employer] account. You can access your account anytime at [email or web address] Did you know that you can access your hospital and ER discharge instructions at any time in [employer]? You can also review all of your test results from your hospital stay or ER visit. Additional Information If you have questions, please visit the Frequently Asked Questions section of the [employer] website at [email or web address] Remember, [employer] is NOT to be used for urgent needs. For medical emergencies, dial 911. Now available from your iPhone and Android! Please provide this summary of care documentation to your next provider. Your primary care clinician is listed as [address] [place] [address]. If you have any questions after today's visit, please call [phone].

## 2017-08-14 NOTE — PROGRESS NOTES
Gricel Zelaya is a 64 y.o. female here for   Chief Complaint   Patient presents with    New Patient     referred by Dr. Prabhakar Van for DM       Functional glucose monitor and record keeping system? - yes  Eye exam within last year? - no  Foot exam within last year? - Dr. Eloisa Martin few months ago    1. Have you been to the ER, urgent care clinic since your last visit? Hospitalized since your last visit? - n/a    2. Have you seen or consulted any other health care providers outside of the [address] [place] [address] since your last visit?   Include any pap smears or colon screening.- no      Lab Results   Component Value Date/Time    Hemoglobin A1c 9.5 06/17/2016 04:53 PM    Hemoglobin A1c (POC) 9.4 06/13/2017 04:27 PM       Wt Readings from Last 3 Encounters:   06/13/17 199 lb 9.6 oz (90.5 kg)   12/13/16 200 lb (90.7 kg)   09/28/16 200 lb (90.7 kg)     Temp Readings from Last 3 Encounters:   06/13/17 98.5 °F (36.9 °C) (Oral)   12/13/16 98.3 °F (36.8 °C) (Oral)   09/28/16 99.1 °F (37.3 °C) (Oral)     BP Readings from Last 3 Encounters:   06/13/17 112/71   12/13/16 126/75   09/28/16 136/78     Pulse Readings from Last 3 Encounters:   06/13/17 86   12/13/16 96   09/28/16 98

## 2017-08-18 ENCOUNTER — TELEPHONE (OUTPATIENT)
Dept: FAMILY MEDICINE CLINIC | Age: 62
End: 2017-08-18

## 2017-08-31 NOTE — TELEPHONE ENCOUNTER
----- Message from Keila Singh sent at 8/31/2017 12:07 PM EDT -----  Regarding: Dr Tony Logan: [phone]  Pt has requested Tramadol to be refilled - pharmacist awaiting authorization. Out of medication and in a lot of pain.    Please call Bryan located at [facility] [address]   Patient [phone]

## 2017-08-31 NOTE — TELEPHONE ENCOUNTER
----- Message from [employer] sent at 8/31/2017  4:51 PM EDT -----  Regarding: Dr. Vy Khan  Pt checking on status of refill on Rx \"Tremendol to [facility] [phone]. \[facility] [place] [facility]\"" pharmacy has been calling since Friday 08/25/17. Best contact number [phone].

## 2017-09-01 RX ORDER — TRAMADOL HYDROCHLORIDE 50 MG/1
50 TABLET ORAL
Qty: 120 TAB | Refills: 0 | Status: SHIPPED | OUTPATIENT
Start: 2017-09-01 | End: 2017-10-10 | Stop reason: SDUPTHER

## 2017-09-01 NOTE — TELEPHONE ENCOUNTER
Patient is calling and asking that med refill be addressed today. Patient states the pharmacy told her they sent a request 8/25/17. Patient notes she's in pain. Patient states that she calls pharmacy in advance and doesn't get approval for medication until a week later.

## 2017-09-11 ENCOUNTER — TELEPHONE (OUTPATIENT)
Dept: FAMILY MEDICINE CLINIC | Age: 62
End: 2017-09-11

## 2017-09-11 NOTE — TELEPHONE ENCOUNTER
Called patient per nurse request and she made an appointment here for the Tuesday late evening acute clinic.

## 2017-09-11 NOTE — TELEPHONE ENCOUNTER
----- Message from Catalina Guerrero sent at 9/11/2017 12:26 PM EDT -----  Regarding: Caden/telephone  Pt stated she is having a pain on her left side. She stated she is unable to see the kidney doctor until 10/11/17. She is requesting to know what she should do. Pts number is [phone].

## 2017-09-12 DIAGNOSIS — E13.42 DIABETIC POLYNEUROPATHY ASSOCIATED WITH OTHER SPECIFIED DIABETES MELLITUS (HCC): ICD-10-CM

## 2017-09-12 DIAGNOSIS — I10 UNSPECIFIED ESSENTIAL HYPERTENSION: Primary | ICD-10-CM

## 2017-10-09 RX ORDER — TRAMADOL HYDROCHLORIDE 50 MG/1
50 TABLET ORAL
Qty: 120 TAB | Refills: 0 | Status: CANCELLED | OUTPATIENT
Start: 2017-10-09

## 2017-10-09 NOTE — TELEPHONE ENCOUNTER
----- Message from Aarti Ochoa sent at 10/9/2017  5:47 PM EDT -----  Regarding: Dr Negrete/Refill  Pt requesting Rx for Tramadol; has only 1 tablet remaining and wants to  Rx when she comes in on 10/10/17     Best contact [phone]      Requested Prescriptions     Pending Prescriptions Disp Refills    traMADol (ULTRAM) 50 mg tablet 120 Tab 0     Sig: Take 1 Tab by mouth every six (6) hours as needed for Pain. Max Daily Amount: 200 mg.

## 2017-10-10 ENCOUNTER — OFFICE VISIT (OUTPATIENT)
Dept: FAMILY MEDICINE CLINIC | Age: 62
End: 2017-10-10

## 2017-10-10 VITALS
HEART RATE: 81 BPM | RESPIRATION RATE: 18 BRPM | OXYGEN SATURATION: 97 % | HEIGHT: 63 IN | SYSTOLIC BLOOD PRESSURE: 102 MMHG | DIASTOLIC BLOOD PRESSURE: 67 MMHG | BODY MASS INDEX: 34.2 KG/M2 | TEMPERATURE: 98.9 F | WEIGHT: 193 LBS

## 2017-10-10 DIAGNOSIS — Z12.39 SCREENING BREAST EXAMINATION: ICD-10-CM

## 2017-10-10 DIAGNOSIS — E11.21 TYPE 2 DIABETES MELLITUS WITH DIABETIC NEPHROPATHY, WITHOUT LONG-TERM CURRENT USE OF INSULIN (HCC): ICD-10-CM

## 2017-10-10 DIAGNOSIS — Z00.00 MEDICARE ANNUAL WELLNESS VISIT, SUBSEQUENT: Primary | ICD-10-CM

## 2017-10-10 RX ORDER — TRAMADOL HYDROCHLORIDE 50 MG/1
50 TABLET ORAL
Qty: 360 TAB | Refills: 0 | Status: SHIPPED | OUTPATIENT
Start: 2017-10-10 | End: 2018-01-18 | Stop reason: SDUPTHER

## 2017-10-10 NOTE — PROGRESS NOTES
Date of visit: 10/10/2017       This is an Subsequent Medicare Annual Wellness Visit. I have reviewed the patient's medical history in detail and updated the computerized patient record. Florinda Muse is a 64 y.o. female   History obtained from: the patient. Concerns today   - inability to get tramadol refilled in a timely manner    History     Patient Active Problem List   Diagnosis Code    Hypertension I10    Diabetes ([facility] E11.9    Hypercholesterolemia E78.00    Neuropathy due to secondary diabetes ([facility] E13.40    Lumbar stenosis M48.061    Lumbar herniated disc M51.26    Postcoital UTI N39.0    Type 2 diabetes mellitus with diabetic nephropathy, without long-term current use of insulin ([facility]) E11.21    Type 2 diabetes mellitus with hyperglycemia, without long-term current use of insulin ([facility]) E11.65     Past Medical History:   Diagnosis Date    Diabetes ([facility]     Genital herpes simplex type 2     GERD (gastroesophageal reflux disease)     no medication prescribed    Hypercholesterolemia     Hypertension     Narcolepsy     Neuropathy in diabetes [facility])       Past Surgical History:   Procedure Laterality Date    EXTRACTION ERUPTED TOOTH/EXR Right 14    3 UPPER RIGHT SIDE BACK TEETH    HX  SECTION  03/15/1979    HX  SECTION  1990    HX MOHS PROCEDURES Right     HX ORTHOPAEDIC Bilateral 2005    KNEE REPLACEMENT     Allergies   Allergen Reactions    Aspirin Hives     Tolerates ibuprofen and naproxen    Clindamycin Rash    Hydrocodone Nausea and Vomiting     Pt stated she is not allergic     Current Outpatient Prescriptions   Medication Sig Dispense Refill    traMADol (ULTRAM) 50 mg tablet Take 1 Tab by mouth every six (6) hours as needed for Pain. Max Daily Amount: 200 mg. 120 Tab 0    dulaglutide (TRULICITY) 3.22 BM/6.9 mL sub-q pen 0.5 mL by SubCUTAneous route every seven (7) days.  4 Syringe 5    metFORMIN (GLUCOPHAGE) 850 mg tablet Take 1 Tab by mouth three (3) times daily. 90 Tab 3    rosuvastatin (CRESTOR) 10 mg tablet Take 1 Tab by mouth daily. 90 Tab 3    glipiZIDE (GLUCOTROL) 10 mg tablet Takes 2 tabs PO in the am, 1 tab PO in the afternoon and 1 tab PO at night 360 Tab 3    gabapentin (NEURONTIN) 300 mg capsule Take 1 Cap by mouth nightly. 90 Cap 3    amLODIPine-benazepril (LOTREL) 10-20 mg per capsule Take 1 Cap by mouth daily. 90 Cap 3    hydrochlorothiazide (HYDRODIURIL) 25 mg tablet Take 1 Tab by mouth daily. 90 Tab 3    glucose blood VI test strips (ACCU-CHEK SMARTVIEW TEST STRIP) strip Check BS once weekly 90 Strip 0    ibuprofen (MOTRIN) 200 mg tablet Take 400 mg by mouth every six (6) hours as needed for Pain.  ondansetron (ZOFRAN ODT) 8 mg disintegrating tablet Take 1 Tab by mouth every eight (8) hours as needed for Nausea. 20 Tab 2    omega-3 fatty acids-vitamin e (FISH OIL) 1,000 mg Cap Take 1 Cap by mouth daily. Family History   Problem Relation Age of Onset    Diabetes Mother     Heart Disease Mother     Hypertension Mother    [facility] Arthritis-rheumatoid Mother     Diabetes Father     Heart Disease Father     Cancer Maternal Aunt      bone    Diabetes Sister      Social History   Substance Use Topics    Smoking status: Never Smoker    Smokeless tobacco: Never Used    Alcohol use No      Comment: rarely       Specialists/Care Team   Paul Wilkinson Checameron has established care with the following healthcare providers:  Patient Care Team:  Lulu Burgos MD as PCP - [address]     Demographics   female  64 y.o.     General Health Questions   -During the past 4 weeks:   -how would you rate your health in general? Fair   -how often have you been bothered by feeling dizzy when standing up? many days   -how much have you been bothered by bodily pain? severely   -Have you noticed any hearing difficulties? no   -has your physical and emotional health limited your social activities with family or friends? no    Emotional Health Questions   -Do you have a history of depression, anxiety, or emotional problems? no  -Over the past 2 weeks, have you felt down, depressed or hopeless? no  -Over the past 2 weeks, have you felt little interest or pleasure in doing things? no    Health Habits   Please describe your diet habits: Declined to answer  Do you get 5 servings of fruits or vegetables daily? no  Do you exercise regularly? yes    Activities of Daily Living and Functional Status   -Do you need help with eating, walking, dressing, bathing, toileting, the phone, transportation, shopping, preparing meals, housework, laundry, medications or managing money? no  -In the past four weeks, was someone available to help you if you needed and wanted help with anything? yes  -Are you confident are you that you can control and manage most of your health problems? N/A  -Have you been given information to help you keep track of your medications? yes  -How often do you have trouble taking your medications as prescribed? never    Fall Risk and Home Safety   Have you fallen 2 or more times in the past year? no  Does your home have rugs in the hallway, lack grab bars in the bathroom, lack handrails on the stairs or have poor lighting? yes  Do you have smoke detectors and check them regularly?  yes  Do you have difficulties driving a car? no  Do you always fasten your seat belt when you are in a car? yes    Review of Systems (if indicated for problems addressed today)   Gen: Denies fever, chills, sick contacts  Heme: Denies easy bleeding, bruising  Endocrine: Denies significant weight loss, gain  Cardio: Denies chest pain, palpitations  Lungs: Denies shortness of breath, cough  GI: Denies abdominal pain, melena, n/v, d/c  : Denies dysuria, hematuria  MSK: Denies cramping, weakness  Neuro: Denies vision changes, numbness  Psych: Denies depressive sx, anxiety or trouble sleeping    Physical Examination Vitals:    10/10/17 1622   BP: 102/67   Pulse: 81   Resp: 18   Temp: 98.9 °F (37.2 °C)   TempSrc: Oral   SpO2: 97%   Weight: 193 lb (87.5 kg)   Height: 5' 3\" (1.6 m)     Body mass index is 34.19 kg/(m^2). No exam data present  Was the patient's timed Up & Go test unsteady or longer than 30 seconds? no    Evaluation of Cognitive Function   Mood/affect:  neutral  Orientation: Person, Place and Time  Appearance: age appropriate and casually dressed      Additional exam if indicated for problems addressed today:    Advice/Referrals/Counseling (as indicated)       Preventive Services   (Preventive care checklist to be included in patient instructions)  Discussed today Done Previously Not Needed    X 12/2016 (PPSV23)  Pneumococcal vaccines   X   Flu vaccine   X   Hepatitis B vaccine (if at risk)   X   Shingles vaccine   X   TDAP vaccine   X 10/7/16  Mammogram   X 5/23/14  Pap smear    Referral sent  5/23/14  Colorectal cancer screening     X Low-dose CT for lung cancer screening     X Bone density test   X 2015  Glaucoma screening    12/13/16  Cholesterol test    6/13/17  Diabetes screening test     6/13/17  Diabetes self-management class      Nutritionist referral for diabetes or renal disease     Discussion of Advance Directive   Discussed with Paul Fischer her ability to prepare and advance directive in the case that an injury or illness causes her to be unable to make health care decisions. Advance Care Plan on File. Assessment/Plan   Z00.00    ICD-10-CM ICD-9-CM    1. Medicare annual wellness visit, subsequent Z00.00 V70.0      No orders of the defined types were placed in this encounter.     Medicare Wellness, Subsequent  - Patient declined Tdap, Flu, [place] vaccine, PCV13  - Discussed need for annual eye exam  - Order for B/L Mammogram   - PAP with HR HPV due 01/18  - Follow up in next year for next Medicare Wellness Exam    Follow-up Disposition: Not on File     Patient discussed with  Tommie Rubio MD (Attending)    Marco Puente DO, PGY1

## 2017-10-10 NOTE — PROGRESS NOTES
Chief Complaint   Patient presents with   [employer] Annual Wellness Visit     medicare     1. Have you been to the ER, urgent care clinic since your last visit? Hospitalized since your last visit? No    2. Have you seen or consulted any other health care providers outside of the [employer] [facility] since your last visit? Include any pap smears or colon screening.  No

## 2017-10-10 NOTE — PROGRESS NOTES
Presents for [employer] visit    Saw endocrinologist  Put her on trulicity  States that she is losing weight on it    States that her feet continue to bother her   States that pain is bearable as long as she is taking tramadol  Has difficulty with getting her medication refilled (takes tramadol 4x a day)    Please refer to Dr. Reis Marrow documentation for more details.

## 2017-10-15 DIAGNOSIS — E78.00 HYPERCHOLESTEROLEMIA: ICD-10-CM

## 2017-10-15 DIAGNOSIS — I10 ESSENTIAL HYPERTENSION WITH GOAL BLOOD PRESSURE LESS THAN 130/80: ICD-10-CM

## 2017-10-16 RX ORDER — ROSUVASTATIN CALCIUM 10 MG/1
TABLET, COATED ORAL
Qty: 90 TAB | Refills: 0 | Status: SHIPPED | OUTPATIENT
Start: 2017-10-16 | End: 2018-01-13 | Stop reason: SDUPTHER

## 2017-10-16 RX ORDER — AMLODIPINE AND BENAZEPRIL HYDROCHLORIDE 10; 20 MG/1; MG/1
CAPSULE ORAL
Qty: 90 CAP | Refills: 0 | Status: SHIPPED | OUTPATIENT
Start: 2017-10-16 | End: 2018-01-18 | Stop reason: SDUPTHER

## 2017-10-23 RX ORDER — GABAPENTIN 300 MG/1
CAPSULE ORAL
Qty: 90 CAP | Refills: 0 | Status: SHIPPED | OUTPATIENT
Start: 2017-10-23 | End: 2018-02-10 | Stop reason: SDUPTHER

## 2017-11-02 ENCOUNTER — TELEPHONE (OUTPATIENT)
Dept: FAMILY MEDICINE CLINIC | Age: 62
End: 2017-11-02
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
## 2017-11-08 ENCOUNTER — OFFICE VISIT (OUTPATIENT)
Dept: FAMILY MEDICINE CLINIC | Age: 62
End: 2017-11-08

## 2017-11-08 VITALS
DIASTOLIC BLOOD PRESSURE: 85 MMHG | BODY MASS INDEX: 34.02 KG/M2 | WEIGHT: 192 LBS | TEMPERATURE: 98.3 F | OXYGEN SATURATION: 99 % | RESPIRATION RATE: 16 BRPM | HEART RATE: 98 BPM | SYSTOLIC BLOOD PRESSURE: 136 MMHG | HEIGHT: 63 IN

## 2017-11-08 DIAGNOSIS — S22.41XD CLOSED FRACTURE OF MULTIPLE RIBS OF RIGHT SIDE WITH ROUTINE HEALING: ICD-10-CM

## 2017-11-08 DIAGNOSIS — S12.001S CLOSED NONDISPLACED FRACTURE OF FIRST CERVICAL VERTEBRA, UNSPECIFIED FRACTURE MORPHOLOGY, SEQUELA: ICD-10-CM

## 2017-11-08 PROBLEM — S12.001A CLOSED NONDISPLACED FRACTURE OF FIRST CERVICAL VERTEBRA (HCC): Status: ACTIVE | Noted: 2017-11-08

## 2017-11-08 RX ORDER — METHOCARBAMOL 750 MG/1
750 TABLET, FILM COATED ORAL AS NEEDED
Refills: 0 | COMMUNITY
Start: 2017-11-05 | End: 2018-12-01

## 2017-11-08 RX ORDER — OXYCODONE HYDROCHLORIDE 5 MG/1
5 TABLET ORAL AS NEEDED
Refills: 0 | COMMUNITY
Start: 2017-11-05 | End: 2018-01-18 | Stop reason: ALTCHOICE

## 2017-11-08 NOTE — MR AVS SNAPSHOT
Visit Information Date & Time Provider Department Dept. Phone Encounter #  
 11/8/2017  3:35 PM Yoel Combs, [address] [facility] [phone] [id number] Your Appointments 11/13/2017  8:30 AM  
LAB with CDE NURSE [facility]) Appt Note: labs [address] [place] [address] [facility] [address]  
[phone]  
  
   
 [address] [facility] [address]  
  
    
 11/21/2017  8:30 AM  
ROUTINE CARE with Jg Ramirez MD  
[facility]) Appt Note: 3mo fu dm  
 [address] [facility] [address]  
[phone]  
  
   
 [address] [facility] [address] Upcoming Health Maintenance Date Due DTaP/Tdap/Td series (1 - Tdap) 10/30/1976 FOBT Q 1 YEAR AGE 50-75 10/30/2005 ZOSTER VACCINE AGE 60> 8/30/2015 EYE EXAM RETINAL OR DILATED Q1 8/20/2016 PAP AKA CERVICAL CYTOLOGY 5/23/2017 Influenza Age 5 to Adult 8/1/2017 HEMOGLOBIN A1C Q6M 12/13/2017 MICROALBUMIN Q1 12/13/2017 LIPID PANEL Q1 12/13/2017 FOOT EXAM Q1 6/13/2018 BREAST CANCER SCRN MAMMOGRAM 10/13/2018 Allergies as of 11/8/2017  Review Complete On: 11/8/2017 By: Yoel Combs MD  
  
 Severity Noted Reaction Type Reactions Aspirin  06/28/2010    Hives Tolerates ibuprofen and naproxen Clindamycin  05/16/2011    Rash Hydrocodone  12/31/2013    Nausea and Vomiting Pt stated she is not allergic Current Immunizations  Reviewed on 12/13/2016 Name Date Pneumococcal Polysaccharide (PPSV-23) 12/13/2016 Not reviewed this visit You Were Diagnosed With   
  
 Codes Comments Closed nondisplaced fracture of first cervical vertebra, unspecified fracture morphology, sequela     ICD-10-CM: S12.001S ICD-9-CM: 905.1 Closed fracture of multiple ribs of right side with routine healing     ICD-10-CM: S22.41XD ICD-9-CM: V54.19 Vitals BP Pulse Temp Resp Height(growth percentile) Weight(growth percentile) 136/85 98 98.3 °F (36.8 °C) (Oral) 16 5' 3\" (1.6 m) 192 lb (87.1 kg) LMP SpO2 BMI OB Status Smoking Status 01/01/2008 (LMP Unknown) 99% 34.01 kg/m2 Postmenopausal Never Smoker Vitals History BMI and BSA Data Body Mass Index Body Surface Area 34.01 kg/m 2 1.97 m 2 Preferred Pharmacy Pharmacy Name Phone [address] [place] [phone] Your Updated Medication List  
  
   
This list is accurate as of: 11/8/17  4:58 PM.  Always use your most recent med list. amLODIPine-benazepril 10-20 mg per capsule Commonly known as:  LOTREL  
TAKE 1 CAPSULE BY MOUTH DAILY  
  
 dulaglutide 0.75 mg/0.5 mL sub-q pen Commonly known as:  TRULICITY  
0.5 mL by SubCUTAneous route every seven (7) days. FISH OIL 1,000 mg Cap Generic drug:  omega-3 fatty acids-vitamin e Take 1 Cap by mouth daily. gabapentin 300 mg capsule Commonly known as:  NEURONTIN  
TAKE 1 CAPSULE BY MOUTH EVERY NIGHT  
  
 glipiZIDE 10 mg tablet Commonly known as:  Dolph Siad Takes 2 tabs PO in the am, 1 tab PO in the afternoon and 1 tab PO at night  
  
 glucose blood VI test strips strip Commonly known as:  [address] Check BS once weekly  
  
 hydroCHLOROthiazide 25 mg tablet Commonly known as:  HYDRODIURIL Take 1 Tab by mouth daily. ibuprofen 200 mg tablet Commonly known as:  MOTRIN Take 400 mg by mouth every six (6) hours as needed for Pain.  
  
 metFORMIN 850 mg tablet Commonly known as:  GLUCOPHAGE Take 1 Tab by mouth three (3) times daily. methocarbamol 750 mg tablet Commonly known as:  ROBAXIN Take 750 mg by mouth as needed. ondansetron 8 mg disintegrating tablet Commonly known as:  ZOFRAN ODT  
 Take 1 Tab by mouth every eight (8) hours as needed for Nausea. oxyCODONE IR 5 mg immediate release tablet Commonly known as:  [place] Take 5 mg by mouth as needed. rosuvastatin 10 mg tablet Commonly known as:  CRESTOR  
TAKE 1 TABLET BY MOUTH DAILY  
  
 traMADol 50 mg tablet Commonly known as:  ULTRAM  
Take 1 Tab by mouth every six (6) hours as needed for Pain. Max Daily Amount: 200 mg. Indications: NEUROPATHIC PAIN Introducing [facility]! Dear Juan Doe: Thank you for requesting a [employer] account. Our records indicate that you already have an active [employer] account. You can access your account anytime at [email or web address] Did you know that you can access your hospital and ER discharge instructions at any time in [employer]? You can also review all of your test results from your hospital stay or ER visit. Additional Information If you have questions, please visit the Frequently Asked Questions section of the [employer] website at [email or web address] Remember, [employer] is NOT to be used for urgent needs. For medical emergencies, dial 911. Now available from your iPhone and Android! Please provide this summary of care documentation to your next provider. Your primary care clinician is listed as [address] [place] [address], . If you have any questions after today's visit, please call [phone].
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
## 2017-11-30 LAB
ALBUMIN SERPL-MCNC: 4.2 G/DL (ref 3.6–4.8)
ALBUMIN/CREAT UR: 14.2 MG/G CREAT (ref 0–30)
ALBUMIN/GLOB SERPL: 1.3 {RATIO} (ref 1.2–2.2)
ALP SERPL-CCNC: 112 IU/L (ref 39–117)
ALT SERPL-CCNC: 15 IU/L (ref 0–32)
AST SERPL-CCNC: 14 IU/L (ref 0–40)
BILIRUB SERPL-MCNC: 0.3 MG/DL (ref 0–1.2)
BUN SERPL-MCNC: 20 MG/DL (ref 8–27)
BUN/CREAT SERPL: 22 (ref 12–28)
CALCIUM SERPL-MCNC: 9.9 MG/DL (ref 8.7–10.3)
CHLORIDE SERPL-SCNC: 98 MMOL/L (ref 96–106)
CO2 SERPL-SCNC: 29 MMOL/L (ref 18–29)
CREAT SERPL-MCNC: 0.89 MG/DL (ref 0.57–1)
CREAT UR-MCNC: 97.6 MG/DL
EST. AVERAGE GLUCOSE BLD GHB EST-MCNC: 171 MG/DL
GFR SERPLBLD CREATININE-BSD FMLA CKD-EPI: 70 ML/MIN/1.73
GFR SERPLBLD CREATININE-BSD FMLA CKD-EPI: 80 ML/MIN/1.73
GLOBULIN SER CALC-MCNC: 3.2 G/DL (ref 1.5–4.5)
GLUCOSE SERPL-MCNC: 102 MG/DL (ref 65–99)
HBA1C MFR BLD: 7.6 % (ref 4.8–5.6)
LDLC SERPL DIRECT ASSAY-MCNC: 74 MG/DL (ref 0–99)
MICROALBUMIN UR-MCNC: 13.9 UG/ML
POTASSIUM SERPL-SCNC: 4 MMOL/L (ref 3.5–5.2)
PROT SERPL-MCNC: 7.4 G/DL (ref 6–8.5)
SODIUM SERPL-SCNC: 145 MMOL/L (ref 134–144)

## 2018-01-18 DIAGNOSIS — I10 ESSENTIAL HYPERTENSION WITH GOAL BLOOD PRESSURE LESS THAN 130/80: ICD-10-CM

## 2018-01-18 DIAGNOSIS — E11.42 DIABETIC POLYNEUROPATHY ASSOCIATED WITH TYPE 2 DIABETES MELLITUS (HCC): ICD-10-CM

## 2018-01-18 DIAGNOSIS — E11.42 DIABETIC POLYNEUROPATHY ASSOCIATED WITH TYPE 2 DIABETES MELLITUS (HCC): Primary | ICD-10-CM

## 2018-01-18 RX ORDER — AMLODIPINE AND BENAZEPRIL HYDROCHLORIDE 10; 20 MG/1; MG/1
1 CAPSULE ORAL DAILY
Qty: 90 CAP | Refills: 3 | Status: SHIPPED | OUTPATIENT
Start: 2018-01-18 | End: 2018-10-04 | Stop reason: SDUPTHER

## 2018-01-18 RX ORDER — TRAMADOL HYDROCHLORIDE 50 MG/1
50 TABLET ORAL
Qty: 360 TAB | Refills: 1 | OUTPATIENT
Start: 2018-01-18

## 2018-01-18 RX ORDER — TRAMADOL HYDROCHLORIDE 50 MG/1
50 TABLET ORAL
Qty: 360 TAB | Refills: 1 | Status: SHIPPED | OUTPATIENT
Start: 2018-01-18 | End: 2018-02-14 | Stop reason: SDUPTHER

## 2018-01-18 NOTE — TELEPHONE ENCOUNTER
Patient is requesting a refill   Requested Prescriptions     Pending Prescriptions Disp Refills    traMADol (ULTRAM) 50 mg tablet 360 Tab 1     Sig: Take 1 Tab by mouth every six (6) hours as needed for Pain. Max Daily Amount: 200 mg.  Indications: NEUROPATHIC PAIN     Thank you

## 2018-01-25 ENCOUNTER — OFFICE VISIT (OUTPATIENT)
Dept: ENDOCRINOLOGY | Age: 63
End: 2018-01-25

## 2018-01-25 VITALS
HEIGHT: 63 IN | BODY MASS INDEX: 33.81 KG/M2 | OXYGEN SATURATION: 97 % | DIASTOLIC BLOOD PRESSURE: 71 MMHG | WEIGHT: 190.8 LBS | HEART RATE: 78 BPM | SYSTOLIC BLOOD PRESSURE: 121 MMHG | RESPIRATION RATE: 16 BRPM | TEMPERATURE: 98.1 F

## 2018-01-25 DIAGNOSIS — E11.65 TYPE 2 DIABETES MELLITUS WITH HYPERGLYCEMIA, WITHOUT LONG-TERM CURRENT USE OF INSULIN (HCC): Primary | ICD-10-CM

## 2018-01-25 DIAGNOSIS — E78.00 HYPERCHOLESTEROLEMIA: ICD-10-CM

## 2018-01-25 DIAGNOSIS — I10 ESSENTIAL HYPERTENSION: ICD-10-CM

## 2018-01-25 DIAGNOSIS — E11.65 TYPE 2 DIABETES MELLITUS WITH HYPERGLYCEMIA, UNSPECIFIED LONG TERM INSULIN USE STATUS: Primary | ICD-10-CM

## 2018-01-25 RX ORDER — METFORMIN HYDROCHLORIDE 1000 MG/1
1000 TABLET ORAL 2 TIMES DAILY WITH MEALS
Qty: 180 TAB | Refills: 3 | Status: SHIPPED | OUTPATIENT
Start: 2018-01-25 | End: 2018-10-03 | Stop reason: SDUPTHER

## 2018-01-25 RX ORDER — GLIPIZIDE 10 MG/1
10 TABLET ORAL 2 TIMES DAILY
Qty: 180 TAB | Refills: 3 | Status: SHIPPED | OUTPATIENT
Start: 2018-01-25 | End: 2018-10-03 | Stop reason: SDUPTHER
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
## 2018-01-25 NOTE — PROGRESS NOTES
[facility]               Roopa Ledesma MD        [address] 78 [phone] Fax [phone]               Patient Information  Date:1/27/2018  Name : Katie Garcia 58 y.o.     YOB: 1955         Referred by: Michael Voss MD         Chief Complaint   Patient presents with    Diabetes       History of Present Illness: Katie Garcia is a 58 y.o. female here for follow-up of  Type 2 Diabetes Mellitus. Type 2 Diabetes was diagnosed 10 years . [facility] End organ effects of diabetes: nephropathy, neuropathy . Cardiovascular risk factors: diabetes mellitus, post-menopausal   She did not bring the log  She is on Trulicity. Not on low-carb diet      Wt Readings from Last 3 Encounters:   01/25/18 190 lb 12.8 oz (86.5 kg)   11/08/17 192 lb (87.1 kg)   10/10/17 193 lb (87.5 kg)       BP Readings from Last 3 Encounters:   01/25/18 121/71   11/08/17 136/85   10/10/17 102/67           Past Medical History:   Diagnosis Date    [facility] 1995    Genital herpes simplex type 2     GERD (gastroesophageal reflux disease)     no medication prescribed    Hypercholesterolemia     Hypertension     Narcolepsy     Neuropathy in diabetes ([facility]      Current Outpatient Prescriptions   Medication Sig    amLODIPine-benazepril (LOTREL) 10-20 mg per capsule Take 1 Cap by mouth daily.  traMADol (ULTRAM) 50 mg tablet Take 1 Tab by mouth every six (6) hours as needed for Pain. Max Daily Amount: 200 mg. Indications: NEUROPATHIC PAIN    rosuvastatin (CRESTOR) 10 mg tablet TAKE 1 TABLET BY MOUTH DAILY    hydroCHLOROthiazide (HYDRODIURIL) 25 mg tablet TAKE 1 TABLET BY MOUTH DAILY    gabapentin (NEURONTIN) 300 mg capsule TAKE 1 CAPSULE BY MOUTH EVERY NIGHT    glucose blood VI test strips (ACCU-CHEK SMARTVIEW TEST STRIP) strip Check BS once weekly    ibuprofen (MOTRIN) 200 mg tablet Take 400 mg by mouth every six (6) hours as needed for Pain.  omega-3 fatty acids-vitamin e (FISH OIL) 1,000 mg Cap Take 1 Cap by mouth daily.  dulaglutide (TRULICITY) 1.5 ZQ/1.0 mL sub-q pen 0.5 mL by SubCUTAneous route every seven (7) days.  metFORMIN (GLUCOPHAGE) 1,000 mg tablet Take 1 Tab by mouth two (2) times daily (with meals).  glipiZIDE (GLUCOTROL) 10 mg tablet Take 1 Tab by mouth two (2) times a day.  methocarbamol (ROBAXIN) 750 mg tablet Take 750 mg by mouth as needed.  ondansetron (ZOFRAN ODT) 8 mg disintegrating tablet Take 1 Tab by mouth every eight (8) hours as needed for Nausea. No current facility-administered medications for this visit. Allergies   Allergen Reactions    Aspirin Hives     Tolerates ibuprofen and naproxen    Clindamycin Rash    Hydrocodone Nausea and Vomiting     Pt stated she is not allergic       Review of Systems:  All 10 systems reviewed and are negative other than mentioned in HPI    Physical Examination:   Blood pressure 121/71, pulse 78, temperature 98.1 °F (36.7 °C), temperature source Oral, resp. rate 16, height 5' 3\" (1.6 m), weight 190 lb 12.8 oz (86.5 kg), last menstrual period 01/01/2008, SpO2 97 %. Estimated body mass index is 33.8 kg/(m^2) as calculated from the following:    Height as of this encounter: 5' 3\" (1.6 m). -   Weight as of this encounter: 190 lb 12.8 oz (86.5 kg).   - General: pleasant, no distress, good eye contact  - HEENT: no pallor, no periorbital edema, EOMI  - Neck: supple,   -   - Musculoskeletal: no proximal muscle weakness in upper or lower extremities  - Integumentary: ,no edema,   - Neurological: ,alert and oriented  - Psychiatric: normal mood and affect  - Skin: color, texture, turgor normal.     Diabetic foot exam: January 2018    Left:    Vibratory sensation absent    Filament test absent sensation with micro filament   Pulse DP: 1 +    Deformities: none     Right:    Vibratory sensation absent   Filament test absent sensation with micro filament   Pulse DP: 1+ Deformities: none     Data Reviewed:     [] Glucose records reviewed. [] See glucose records for details (to be scanned). [] A1C  [] Reviewed labs    Lab Results   Component Value Date/Time    Hemoglobin A1c 7.6 11/29/2017 12:00 AM    Hemoglobin A1c 9.5 06/17/2016 04:53 PM    Hemoglobin A1c 10.4 03/22/2016 04:27 PM    Glucose 102 11/29/2017 12:00 AM    Glucose (POC) 120 10/14/2015 09:51 AM    Microalbumin/Creat ratio (mg/g creat) 11 06/03/2010 12:37 PM    Microalb/Creat ratio (ug/mg creat.) 14.2 11/29/2017 12:00 AM    Microalbumin,urine random 1.99 06/03/2010 12:37 PM    LDL,Direct 74 11/29/2017 12:00 AM    LDL, calculated 69 12/13/2016 10:28 AM    Creatinine (POC) 0.8 03/17/2014 03:37 PM    Creatinine 0.89 11/29/2017 12:00 AM      Lab Results   Component Value Date/Time    Cholesterol, total 145 12/13/2016 10:28 AM    HDL Cholesterol 53 12/13/2016 10:28 AM    LDL,Direct 74 11/29/2017 12:00 AM    LDL, calculated 69 12/13/2016 10:28 AM    Triglyceride 116 12/13/2016 10:28 AM    CHOL/HDL Ratio 2.6 03/18/2010 10:05 AM     Lab Results   Component Value Date/Time    ALT (SGPT) 15 11/29/2017 12:00 AM    AST (SGOT) 14 11/29/2017 12:00 AM    Alk.  phosphatase 112 11/29/2017 12:00 AM    Bilirubin, total 0.3 11/29/2017 12:00 AM    Albumin 4.2 11/29/2017 12:00 AM    Protein, total 7.4 11/29/2017 12:00 AM    PLATELET 640 23/54/3047 04:30 PM       Lab Results   Component Value Date/Time    GFR est non-AA 70 11/29/2017 12:00 AM    GFRNA, POC >60 03/17/2014 03:37 PM    GFR est AA 80 11/29/2017 12:00 AM    GFRAA, POC >60 03/17/2014 03:37 PM    Creatinine 0.89 11/29/2017 12:00 AM    Creatinine (POC) 0.8 03/17/2014 03:37 PM    BUN 20 11/29/2017 12:00 AM    BUN (POC) 14 11/30/2013 02:22 PM    Sodium 145 11/29/2017 12:00 AM    Sodium (POC) 140 11/30/2013 02:22 PM    Potassium 4.0 11/29/2017 12:00 AM    Potassium (POC) 3.5 11/30/2013 02:22 PM    Chloride 98 11/29/2017 12:00 AM    Chloride (POC) 104 11/30/2013 02:22 PM    CO2 29 11/29/2017 12:00 AM    Magnesium 1.9 01/03/2014 03:12 AM               Assessment/Plan:   1. Type 2 diabetes mellitus with hyperglycemia, without long-term current use of insulin ([facility]    2. Hypercholesterolemia    3. Essential hypertension        1. Type 2 Diabetes Mellitus with nephropathy,neuropathy,  Lab Results   Component Value Date/Time    Hemoglobin A1c 7.6 11/29/2017 12:00 AM    Hemoglobin A1c (POC) 9.4 06/13/2017 04:27 PM     Metformin 1 tab in AM and 1 tab dinner . Glipizide 10 mg in AM and 10 mg before dinner. Trulicity weekly       2. HTN : Continue current therapy     3. Hyperlipidemia : Continue statin. 4.Obesity:Body mass index is 33.8 kg/(m^2). Discussed about the importance of exercise and carbohydrate portion control. Patient Instructions   Glipizide 1/2 a tablet twice a day , if you have low sugars are less than 80     Follow-up Disposition:  Return in about 4 months (around 5/25/2018). Thank you for allowing me to participate in the care of this patient.     Kendal Dash MD      Patient verbalized understanding

## 2018-01-25 NOTE — MR AVS SNAPSHOT
[address] [facility] [address] 
[phone] Patient: Sharee Loredo MRN: [id number] [facility]:90/00/[birth year]25 Visit Information Date & Time Provider Department Dept. Phone Encounter #  
 1/25/2018  8:30 AM Kady Acosta MD [facility] [phone] [id number] Follow-up Instructions Return in about 4 months (around 5/25/2018). Your Appointments 2/2/2018 10:20 AM  
COMPLETE PHYSICAL with Juany Landa MD  
[address] [place] [address]) Appt Note: pap only [address] [facility]  
[phone]  
  
   
 [address] [facility] [phone] Upcoming Health Maintenance Date Due DTaP/Tdap/Td series (1 - Tdap) 10/30/1976 FOBT Q 1 YEAR AGE 50-75 10/30/2005 ZOSTER VACCINE AGE 60> 8/30/2015 EYE EXAM RETINAL OR DILATED Q1 8/20/2016 PAP AKA CERVICAL CYTOLOGY 5/23/2017 Influenza Age 5 to Adult 8/1/2017 LIPID PANEL Q1 12/13/2017 HEMOGLOBIN A1C Q6M 5/29/2018 FOOT EXAM Q1 6/13/2018 BREAST CANCER SCRN MAMMOGRAM 10/13/2018 MICROALBUMIN Q1 11/29/2018 Allergies as of 1/25/2018  Review Complete On: 1/25/2018 By: Kady Acosta MD  
  
 Severity Noted Reaction Type Reactions Aspirin  06/28/2010    Hives Tolerates ibuprofen and naproxen Clindamycin  05/16/2011    Rash Hydrocodone  12/31/2013    Nausea and Vomiting Pt stated she is not allergic Current Immunizations  Reviewed on 12/13/2016 Name Date Pneumococcal Polysaccharide (PPSV-23) 12/13/2016 Not reviewed this visit You Were Diagnosed With   
  
 Codes Comments Type 2 diabetes mellitus with hyperglycemia, without long-term current use of insulin (HCC)    -  Primary ICD-10-CM: E11.65 ICD-9-CM: 250.00, 790.29 Hypercholesterolemia     ICD-10-CM: E78.00 ICD-9-CM: 272.0  Essential hypertension     ICD-10-CM: I10 
 ICD-9-CM: 401.9 Vitals BP Pulse Temp Resp Height(growth percentile) Weight(growth percentile) 121/71 (BP 1 Location: Right arm, BP Patient Position: Sitting) 78 98.1 °F (36.7 °C) (Oral) 16 5' 3\" (1.6 m) 190 lb 12.8 oz (86.5 kg) LMP SpO2 BMI OB Status Smoking Status 01/01/2008 (LMP Unknown) 97% 33.8 kg/m2 Postmenopausal Never Smoker Vitals History BMI and BSA Data Body Mass Index Body Surface Area  
 33.8 kg/m 2 1.96 m 2 Preferred Pharmacy Pharmacy Name Phone [address] [phone] Your Updated Medication List  
  
   
This list is accurate as of: 1/25/18  9:19 AM.  Always use your most recent med list. amLODIPine-benazepril 10-20 mg per capsule Commonly known as:  Sherin Cart Take 1 Cap by mouth daily. dulaglutide 0.75 mg/0.5 mL sub-q pen Commonly known as:  TRULICITY  
0.5 mL by SubCUTAneous route every seven (7) days. FISH OIL 1,000 mg Cap Generic drug:  omega-3 fatty acids-vitamin e Take 1 Cap by mouth daily. gabapentin 300 mg capsule Commonly known as:  NEURONTIN  
TAKE 1 CAPSULE BY MOUTH EVERY NIGHT  
  
 glipiZIDE 10 mg tablet Commonly known as:  Janine Kingdom Takes 2 tabs PO in the am, 1 tab PO in the afternoon and 1 tab PO at night  
  
 glucose blood VI test strips strip Commonly known as:  [address] Check BS once weekly  
  
 hydroCHLOROthiazide 25 mg tablet Commonly known as:  HYDRODIURIL  
TAKE 1 TABLET BY MOUTH DAILY  
  
 ibuprofen 200 mg tablet Commonly known as:  MOTRIN Take 400 mg by mouth every six (6) hours as needed for Pain.  
  
 metFORMIN 850 mg tablet Commonly known as:  GLUCOPHAGE Take 1 Tab by mouth three (3) times daily. methocarbamol 750 mg tablet Commonly known as:  ROBAXIN Take 750 mg by mouth as needed. ondansetron 8 mg disintegrating tablet Commonly known as:  ZOFRAN ODT Take 1 Tab by mouth every eight (8) hours as needed for Nausea. rosuvastatin 10 mg tablet Commonly known as:  CRESTOR  
TAKE 1 TABLET BY MOUTH DAILY  
  
 traMADol 50 mg tablet Commonly known as:  ULTRAM  
Take 1 Tab by mouth every six (6) hours as needed for Pain. Max Daily Amount: 200 mg. Indications: NEUROPATHIC PAIN Follow-up Instructions Return in about 4 months (around 5/25/2018). Patient Instructions Glipizide 1/2 a tablet twice a day , if you have low sugars are less than 80 Introducing [facility] SERVICES! Dear Nehemiah Gerard: Thank you for requesting a Zuli account. Our records indicate that you already have an active Zuli account. You can access your account anytime at [email or web address] Did you know that you can access your hospital and ER discharge instructions at any time in Zuli? You can also review all of your test results from your hospital stay or ER visit. Additional Information If you have questions, please visit the Frequently Asked Questions section of the Zuli website at [email or web address] Remember, Zuli is NOT to be used for urgent needs. For medical emergencies, dial 911. Now available from your iPhone and Android! Please provide this summary of care documentation to your next provider. Your primary care clinician is listed as [address] [place] [address]. If you have any questions after today's visit, please call [phone].

## 2018-02-02 ENCOUNTER — HOSPITAL ENCOUNTER (OUTPATIENT)
Dept: LAB | Age: 63
Discharge: HOME | End: 2018-02-02
Payer: MEDICARE

## 2018-02-02 ENCOUNTER — OFFICE VISIT (OUTPATIENT)
Dept: FAMILY MEDICINE CLINIC | Age: 63
End: 2018-02-02

## 2018-02-02 VITALS
HEART RATE: 78 BPM | TEMPERATURE: 98.2 F | HEIGHT: 63 IN | RESPIRATION RATE: 18 BRPM | OXYGEN SATURATION: 98 % | WEIGHT: 189 LBS | BODY MASS INDEX: 33.49 KG/M2 | DIASTOLIC BLOOD PRESSURE: 77 MMHG | SYSTOLIC BLOOD PRESSURE: 129 MMHG

## 2018-02-02 DIAGNOSIS — E11.42 TYPE 2 DIABETES MELLITUS WITH DIABETIC POLYNEUROPATHY, UNSPECIFIED LONG TERM INSULIN USE STATUS: ICD-10-CM

## 2018-02-02 DIAGNOSIS — Z01.419 ENCOUNTER FOR WELL WOMAN EXAM WITH ROUTINE GYNECOLOGICAL EXAM: Primary | ICD-10-CM

## 2018-02-02 DIAGNOSIS — E78.00 HYPERCHOLESTEROLEMIA: ICD-10-CM

## 2018-02-02 DIAGNOSIS — Z01.419 WELL WOMAN EXAM WITH ROUTINE GYNECOLOGICAL EXAM: ICD-10-CM

## 2018-02-02 DIAGNOSIS — N89.8 VAGINAL DISCHARGE: ICD-10-CM

## 2018-02-02 DIAGNOSIS — Z23 ENCOUNTER FOR IMMUNIZATION: ICD-10-CM

## 2018-02-02 PROCEDURE — 86696 HERPES SIMPLEX TYPE 2 TEST: CPT

## 2018-02-02 PROCEDURE — 88175 CYTOPATH C/V AUTO FLUID REDO: CPT | Performed by: FAMILY MEDICINE

## 2018-02-02 PROCEDURE — 87624 HPV HI-RISK TYP POOLED RSLT: CPT | Performed by: FAMILY MEDICINE

## 2018-02-02 PROCEDURE — 83036 HEMOGLOBIN GLYCOSYLATED A1C: CPT

## 2018-02-02 PROCEDURE — 85027 COMPLETE CBC AUTOMATED: CPT

## 2018-02-02 PROCEDURE — 80061 LIPID PANEL: CPT

## 2018-02-02 PROCEDURE — 80053 COMPREHEN METABOLIC PANEL: CPT

## 2018-02-02 RX ORDER — CEPHALEXIN 500 MG/1
500 CAPSULE ORAL 2 TIMES DAILY
Qty: 14 CAP | Refills: 0 | Status: SHIPPED | OUTPATIENT
Start: 2018-02-02 | End: 2018-02-09

## 2018-02-02 NOTE — LETTER
2/6/2018 1:19 PM 
 
Ms. Singletary Leader [address] [facility] [address] Dear Gemini Singleton: 
 
Please find your most recent results below. Resulted Orders LIPID PANEL Result Value Ref Range Cholesterol, total 143 100 - 199 mg/dL Triglyceride 80 0 - 149 mg/dL HDL Cholesterol 57 >39 mg/dL VLDL, calculated 16 5 - 40 mg/dL LDL, calculated 70 0 - 99 mg/dL Narrative Performed at:  [address]  [id number] : Josh Tatum MD, Phone:  [phone] METABOLIC PANEL, COMPREHENSIVE Result Value Ref Range Glucose 194 (H) 65 - 99 mg/dL BUN 19 8 - 27 mg/dL Creatinine 1.09 (H) 0.57 - 1.00 mg/dL GFR est non-AA 55 (L) >59 mL/min/1.73 GFR est AA 63 >59 mL/min/1.73  
 BUN/Creatinine ratio 17 12 - 28 Sodium 148 (H) 134 - 144 mmol/L Potassium 4.7 3.5 - 5.2 mmol/L Chloride 102 96 - 106 mmol/L  
 CO2 28 18 - 29 mmol/L Calcium 9.8 8.7 - 10.3 mg/dL Protein, total 7.6 6.0 - 8.5 g/dL Albumin 4.5 3.6 - 4.8 g/dL GLOBULIN, TOTAL 3.1 1.5 - 4.5 g/dL A-G Ratio 1.5 1.2 - 2.2 Bilirubin, total 0.2 0.0 - 1.2 mg/dL Alk. phosphatase 107 39 - 117 IU/L  
 AST (SGOT) 13 0 - 40 IU/L  
 ALT (SGPT) 16 0 - 32 IU/L Narrative Performed at:  [address]  [id number] : Josh Tatum MD, Phone:  [phone] CBC W/O DIFF Result Value Ref Range WBC 4.4 3.4 - 10.8 x10E3/uL  
 RBC 4.53 3.77 - 5.28 x10E6/uL HGB 12.2 11.1 - 15.9 g/dL HCT 38.8 34.0 - 46.6 % MCV 86 79 - 97 fL  
 MCH 26.9 26.6 - 33.0 pg  
 MCHC 31.4 (L) 31.5 - 35.7 g/dL  
 RDW 15.3 12.3 - 15.4 % PLATELET 190 996 - 917 x10E3/uL Narrative Performed at:  [address]  [id number] : Josh Tatum MD, Phone:  [phone] HEMOGLOBIN A1C WITH EAG Result Value Ref Range Hemoglobin A1c 7.7 (H) 4.8 - 5.6 % Comment:  
            Pre-diabetes: 5.7 - 6.4 Diabetes: >6.4 Glycemic control for adults with diabetes: <7.0 Estimated average glucose 174 mg/dL Narrative Performed at:  [address]  [id number] : Arlice Meckel MD, Phone:  [phone] HSV-2 AB, IGG GLYCOPROTEIN, G-SPECIFIC Result Value Ref Range HSV 2 Ab IgG, type spec. 17.60 (H) 0.00 - 0.90 index Comment:  
                                    Negative        <0.91 Equivocal 0.91 - 1.09 Positive        >1.09 Note: Negative indicates no antibodies detected to HSV-2. Equivocal may suggest early infection. If 
 clinically appropriate, retest at later date. Positive 
 indicates antibodies detected to HSV-2. Narrative Performed at:  [address]  [id number] : Arlice Meckel MD, Phone:  [phone] CVD REPORT Result Value Ref Range INTERPRETATION Note Comment:  
   Supplemental report is available. PDF IMAGE Not applicable Narrative Performed at:  [address] [place] [address] [address] [place] [address]  [id number] : Lisa Navarrete PhD, Phone:  [phone] CKD REPORT Result Value Ref Range Interpretation Note Comment:  
   Supplemental report is available. Narrative Performed at:  [address] [place] [address] [address] [place] [address]  [id number] : Lisa Navarrete PhD, Phone:  [phone] DIABETES PATIENT EDUCATION Result Value Ref Range PDF Image Not applicable Narrative Performed at:  [address] [place] [address] [address] [place] [address]  [id number] : Lisa Navarrete PhD, Phone:  [phone] RECOMMENDATIONS: 
 
Good cholesterol control Kidney function better than 7 months ago, but slightly decreased since 2 months ago -- still would recommend kidney specialist  
Normal liver function tests No anemia Much better glucose control Blood work shows evidence of herpes simplex type 2 virus (genital herpes) Please call me if you have any questions: [phone] Sincerely, Guerline Mfofett MD

## 2018-02-02 NOTE — LETTER
2/8/2018 8:22 AM 
 
Ms. Lori Mg [address] [facility] [address] Dear Lori Mg: 
 
Please find your most recent results below. Resulted Orders CX-VAG CYTOLOGY Narrative [address]  [facility]      [address]         [address] [facility] [facility] [address]      [place], [address]     [address], [address] [facility] 
[phone] Fax# [phone]    Fax# ([phone]      Fax# [phone] 
 
========================================================================== 
                       * * * CYTOPATHOLOGY REPORT* * *   
========================================================================== 
GYNECOLOGICAL * * *Procedures/Addenda Present * * * Patient:  Lalit Oshea             Specimen #:  [id number] Age:  1955 (Age: 58)                Date of Procedure: 2/2/2018 Sex:  F                                  Date of Receipt:  2/5/2018 Hospital#:  [id number]               Date of Report: 2/7/2018 Med. Record #:  [id number] Location:  [facility]) Physician(s):  Araceli Wright MD 
 
   
 
* * * CLINICAL HISTORY* * * Menstrual History: Post-menopausal 
 
ADDITIONAL PATIENT INFORMATION:  
RFX 16 , 18 / 39 HPV REGARDLESS:  
YES  
SOURCE: 
A: Cervical/Endocervical Imaged Processed Thin Prep 
 
 
============================================================================ 
                                * * * FINAL INTERPRETATION* * * Cervical/Endocervical Imaged Processed Thin Prep Satisfactory for evaluation. Shift in emmie suggestive of Bacterial vaginosis Inflammatory changes present. NEGATIVE FOR INTRAEPITHELIAL LESION OR MALIGNANCY. Guillermo Krause .                                                    
 
 HPV HR Interpretation Test: HPV, high-risk Result: NEGATIVE Reference Interval: Negative This high-risk HPV test detects fourteen high-risk types 
(16/18/31/33/35/39/45/51/52/56/58/59/66/68) without differentiation, using 
nucleic acid amplification method. Test performed by: [facility] NATALY Moreno  Gato Hernandez [place] [facility] Phone number:  [phone] Lorin Leigh * * *Electronically Signed* * * 
2/7/2018 ICD10 Codes: 
 J50.128 The Pap test is a screening procedure to aid in the detection of cervical 
cancer and its precursors. It should not be used as the sole means of 
detecting cervical cancer. As with any laboratory test, false negative 
and false positive results are known to occur. RECOMMENDATIONS: 
 
Negative PAP smear Negative HPV HR Please call me if you have any questions: [phone] Sincerely, [facility] LAB OUTREACH INSURANCE

## 2018-02-02 NOTE — MR AVS SNAPSHOT
[address] [id number] Patient: Colton Adams MRN: [id number] WVE:98/85/2973 Visit Information Date & Time Provider Department Dept. Phone Encounter #  
 2/2/2018 10:20 AM Evelyn Garrett [phone] [id number] Your Appointments 5/18/2018  9:15 AM  
LAB with CDE NURSE [facility]) Appt Note: lab only 100 [address] [place] [address] [address] [facility] [address]  
[phone]  
  
   
 [address] [place] [address]  
  
    
 5/25/2018 10:30 AM  
ROUTINE CARE with Javier Blank MD  
[facility]) Appt Note: f/u 4 month  
 [address] [facility] [address] [facility] [address]  
[phone]  
  
   
 [address] [place] [address] Upcoming Health Maintenance Date Due DTaP/Tdap/Td series (1 - Tdap) 10/30/1976 FOBT Q 1 YEAR AGE 50-75 10/30/2005 ZOSTER VACCINE AGE 60> 8/30/2015 EYE EXAM RETINAL OR DILATED Q1 8/20/2016 PAP AKA CERVICAL CYTOLOGY 5/23/2017 Influenza Age 5 to Adult 8/1/2017 LIPID PANEL Q1 12/13/2017 HEMOGLOBIN A1C Q6M 5/29/2018 BREAST CANCER SCRN MAMMOGRAM 10/13/2018 MICROALBUMIN Q1 11/29/2018 FOOT EXAM Q1 1/27/2019 Allergies as of 2/2/2018  Review Complete On: 2/2/2018 By: Johnson Gruber LPN Severity Noted Reaction Type Reactions Aspirin  06/28/2010    Hives Tolerates ibuprofen and naproxen Clindamycin  05/16/2011    Rash Hydrocodone  12/31/2013    Nausea and Vomiting Pt stated she is not allergic Current Immunizations  Reviewed on 12/13/2016 Name Date Pneumococcal Polysaccharide (PPSV-23) 12/13/2016 Not reviewed this visit You Were Diagnosed With   
  
 Codes Comments  Encounter for well woman exam with routine gynecological exam    - Primary ICD-10-CM: Z01.419 ICD-9-CM: V72.31 Type 2 diabetes mellitus with diabetic polyneuropathy, unspecified long term insulin use status (HCC)     ICD-10-CM: E11.42 
ICD-9-CM: 250.60, 357.2 Hypercholesterolemia     ICD-10-CM: E78.00 ICD-9-CM: 272.0 Vaginal discharge     ICD-10-CM: N89.8 ICD-9-CM: 623.5 Vitals BP Pulse Temp Resp Height(growth percentile) Weight(growth percentile) 129/77 (BP 1 Location: Right arm, BP Patient Position: Sitting) 78 98.2 °F (36.8 °C) (Oral) 18 5' 3\" (1.6 m) 189 lb (85.7 kg) LMP SpO2 BMI OB Status Smoking Status 01/01/2008 (LMP Unknown) 98% 33.48 kg/m2 Postmenopausal Never Smoker Vitals History BMI and BSA Data Body Mass Index Body Surface Area  
 33.48 kg/m 2 1.95 m 2 Preferred Pharmacy Pharmacy Name Phone [address] [phone] Your Updated Medication List  
  
   
This list is accurate as of: 2/2/18 11:33 AM.  Always use your most recent med list. amLODIPine-benazepril 10-20 mg per capsule Commonly known as:  Roxie Jumbo Take 1 Cap by mouth daily. cephALEXin 500 mg capsule Commonly known as:  Raford Hailstone Take 1 Cap by mouth two (2) times a day for 7 days. dulaglutide 1.5 mg/0.5 mL sub-q pen Commonly known as:  TRULICITY  
0.5 mL by SubCUTAneous route every seven (7) days. FISH OIL 1,000 mg Cap Generic drug:  omega-3 fatty acids-vitamin e Take 1 Cap by mouth daily. gabapentin 300 mg capsule Commonly known as:  NEURONTIN  
TAKE 1 CAPSULE BY MOUTH EVERY NIGHT  
  
 glipiZIDE 10 mg tablet Commonly known as:  Inderjit Fraise Take 1 Tab by mouth two (2) times a day. glucose blood VI test strips strip Commonly known as:  [address] Check BS once weekly  
  
 hydroCHLOROthiazide 25 mg tablet Commonly known as:  HYDRODIURIL  
 TAKE 1 TABLET BY MOUTH DAILY  
  
 ibuprofen 200 mg tablet Commonly known as:  MOTRIN Take 400 mg by mouth every six (6) hours as needed for Pain.  
  
 metFORMIN 1,000 mg tablet Commonly known as:  GLUCOPHAGE Take 1 Tab by mouth two (2) times daily (with meals). methocarbamol 750 mg tablet Commonly known as:  ROBAXIN Take 750 mg by mouth as needed. ondansetron 8 mg disintegrating tablet Commonly known as:  ZOFRAN ODT Take 1 Tab by mouth every eight (8) hours as needed for Nausea. rosuvastatin 10 mg tablet Commonly known as:  CRESTOR  
TAKE 1 TABLET BY MOUTH DAILY  
  
 traMADol 50 mg tablet Commonly known as:  ULTRAM  
Take 1 Tab by mouth every six (6) hours as needed for Pain. Max Daily Amount: 200 mg. Indications: NEUROPATHIC PAIN Prescriptions Sent to Pharmacy Refills  
 cephALEXin (KEFLEX) 500 mg capsule 0 Sig: Take 1 Cap by mouth two (2) times a day for 7 days. Class: Normal  
 Pharm[facility] [facility] #: [phone] Route: Oral  
  
We Performed the Following CBC W/O DIFF [95395 CPT(R)] HEMOGLOBIN A1C WITH EAG [68094 CPT(R)] HSV-2 AB, IGG GLYCOPROTEIN, G-SPECIFIC [id number] CPT(R)] LIPID PANEL [43851 CPT(R)] METABOLIC PANEL, COMPREHENSIVE [50332 CPT(R)] To-Do List   
 02/02/2018 Imaging:  BRADEN MAMMO BI SCREENING INCL CAD   
  
 02/02/2018 Pathology:  PAP IG, APTIMA HPV AND RFX 16/18,45 ([id number]) [facility] & HEALTH SERVICES! Dear Lowell May: Thank you for requesting a PA Semi account. Our records indicate that you already have an active PA Semi account. You can access your account anytime at [email or web address] Did you know that you can access your hospital and ER discharge instructions at any time in PA Semi?   You can also review all of your test results from your hospital stay or ER visit. Additional Information If you have questions, please visit the Frequently Asked Questions section of the [email or web address] website at [email or web address] Remember, [email or web address] is NOT to be used for urgent needs. For medical emergencies, dial 911. Now available from your iPhone and Android! Please provide this summary of care documentation to your next provider. Your primary care clinician is listed as [address] [place] [address]. If you have any questions after today's visit, please call [phone].

## 2018-02-02 NOTE — PROGRESS NOTES
Presents for annual gyn exam    Denies any breast problems or vaginal bleeding    Has been seeing endocrinologist -- marked improvement in AKLH0C -- taking trulicity -- has lost weight also     Has bilateral tender submandibular lymphadenopathy -- will treat with keflex for one week to see if improvement     Subjective:   58 y.o. female for Well Woman Check. Patient's last menstrual period was 01/01/2008 (lmp unknown). Menopausal    Social History: not sexually active. Pertinent past medical history:    Patient Active Problem List   Diagnosis Code    Hypertension I10    Diabetes ([facility] E11.9    Hypercholesterolemia E78.00    Neuropathy due to secondary diabetes ([facility] [address] E13.40    Lumbar stenosis M48.061    Lumbar herniated disc M51.26    Postcoital UTI N39.0    Type 2 diabetes mellitus with diabetic nephropathy, without long-term current use of insulin ([facility]) E11.21    Type 2 diabetes mellitus with hyperglycemia, without long-term current use of insulin ([facility]) E11.65    Closed nondisplaced fracture of first cervical vertebra ([facility]) S12.001A    Closed fracture of multiple ribs of right side with routine healing S22.41XD     Current Outpatient Prescriptions   Medication Sig Dispense Refill    cephALEXin (KEFLEX) 500 mg capsule Take 1 Cap by mouth two (2) times a day for 7 days. 14 Cap 0    dulaglutide (TRULICITY) 1.5 BH/3.9 mL sub-q pen 0.5 mL by SubCUTAneous route every seven (7) days. 12 Syringe 3    metFORMIN (GLUCOPHAGE) 1,000 mg tablet Take 1 Tab by mouth two (2) times daily (with meals). 180 Tab 3    glipiZIDE (GLUCOTROL) 10 mg tablet Take 1 Tab by mouth two (2) times a day. 180 Tab 3    amLODIPine-benazepril (LOTREL) 10-20 mg per capsule Take 1 Cap by mouth daily. 90 Cap 3    traMADol (ULTRAM) 50 mg tablet Take 1 Tab by mouth every six (6) hours as needed for Pain. Max Daily Amount: 200 mg.  Indications: NEUROPATHIC PAIN 360 Tab 1    rosuvastatin (CRESTOR) 10 mg tablet TAKE 1 TABLET BY MOUTH DAILY 90 Tab 0    hydroCHLOROthiazide (HYDRODIURIL) 25 mg tablet TAKE 1 TABLET BY MOUTH DAILY 90 Tab 3    methocarbamol (ROBAXIN) 750 mg tablet Take 750 mg by mouth as needed. 0    gabapentin (NEURONTIN) 300 mg capsule TAKE 1 CAPSULE BY MOUTH EVERY NIGHT 90 Cap 0    glucose blood VI test strips (ACCU-CHEK SMARTVIEW TEST STRIP) strip Check BS once weekly 90 Strip 0    ibuprofen (MOTRIN) 200 mg tablet Take 400 mg by mouth every six (6) hours as needed for Pain.  ondansetron (ZOFRAN ODT) 8 mg disintegrating tablet Take 1 Tab by mouth every eight (8) hours as needed for Nausea. 20 Tab 2    omega-3 fatty acids-vitamin e (FISH OIL) 1,000 mg Cap Take 1 Cap by mouth daily. Allergies   Allergen Reactions    Aspirin Hives     Tolerates ibuprofen and naproxen    Clindamycin Rash    Hydrocodone Nausea and Vomiting     Pt stated she is not allergic        ROS:  Feeling well. No dyspnea or chest pain on exertion. No abdominal pain, change in bowel habits, black or bloody stools. No urinary tract symptoms. GYN ROS: no breast pain or new or enlarging lumps on self exam, no vaginal bleeding. Thin white vaginal discharge. Bilateral diabetic neuropathy in her feet/legs. Objective:     Visit Vitals    /77 (BP 1 Location: Right arm, BP Patient Position: Sitting)    Pulse 78    Temp 98.2 °F (36.8 °C) (Oral)    Resp 18    Ht 5' 3\" (1.6 m)    Wt 189 lb (85.7 kg)    LMP 01/01/2008 (LMP Unknown)    SpO2 98%    BMI 33.48 kg/m2     The patient appears well, alert, oriented x 3, in no distress. ENT normal.  Neck supple. Bilateral submandibular tender adenopathy. GIRISH. Lungs are clear, good air entry, no wheezes, rhonchi or rales. S1 and S2 normal, no murmurs, regular rate and rhythm. Abdomen soft without tenderness, guarding, mass or organomegaly. Extremities show no edema, normal peripheral pulses. Neurological with no focal findings.     BREAST EXAM: not examined    PELVIC EXAM: normal external genitalia, vulva, vagina, cervix, uterus and adnexa    Assessment/Plan:   well woman  Bilateral submandibular adenopathy  Vaginal discharge   mammogram  pap smear  return annually or prn    ICD-10-CM ICD-9-CM    1. Encounter for well woman exam with routine gynecological exam B87.140 L51.65 METABOLIC PANEL, COMPREHENSIVE      CBC W/O DIFF      PAP IG, APTIMA HPV AND RFX 16/18,45 ([id number])      BRADEN MAMMO BI SCREENING INCL CAD      PAP IG, APTIMA HPV AND RFX 16/18,45 ([id number])   2. Type 2 diabetes mellitus with diabetic polyneuropathy, unspecified long term insulin use status (HCC) E11.42 250.60 HEMOGLOBIN A1C WITH EAG     357.2    3. Hypercholesterolemia E78.00 272.0 LIPID PANEL   4. Vaginal discharge N89.8 623.5 HSV-2 AB, IGG GLYCOPROTEIN, G-SPECIFIC   5. Encounter for immunization Z23 V03.89 TETANUS, DIPHTHERIA TOXOIDS AND ACELLULAR PERTUSSIS VACCINE (TDAP), IN INDIVIDS. >=7, IM   6. Well woman exam with routine gynecological exam Z01.419 V72.31     [V72.31]   .

## 2018-02-03 LAB
ALBUMIN SERPL-MCNC: 4.5 G/DL (ref 3.6–4.8)
ALBUMIN/GLOB SERPL: 1.5 {RATIO} (ref 1.2–2.2)
ALP SERPL-CCNC: 107 IU/L (ref 39–117)
ALT SERPL-CCNC: 16 IU/L (ref 0–32)
AST SERPL-CCNC: 13 IU/L (ref 0–40)
BILIRUB SERPL-MCNC: 0.2 MG/DL (ref 0–1.2)
BUN SERPL-MCNC: 19 MG/DL (ref 8–27)
BUN/CREAT SERPL: 17 (ref 12–28)
CALCIUM SERPL-MCNC: 9.8 MG/DL (ref 8.7–10.3)
CHLORIDE SERPL-SCNC: 102 MMOL/L (ref 96–106)
CHOLEST SERPL-MCNC: 143 MG/DL (ref 100–199)
CO2 SERPL-SCNC: 28 MMOL/L (ref 18–29)
CREAT SERPL-MCNC: 1.09 MG/DL (ref 0.57–1)
ERYTHROCYTE [DISTWIDTH] IN BLOOD BY AUTOMATED COUNT: 15.3 % (ref 12.3–15.4)
EST. AVERAGE GLUCOSE BLD GHB EST-MCNC: 174 MG/DL
GFR SERPLBLD CREATININE-BSD FMLA CKD-EPI: 55 ML/MIN/1.73
GFR SERPLBLD CREATININE-BSD FMLA CKD-EPI: 63 ML/MIN/1.73
GLOBULIN SER CALC-MCNC: 3.1 G/DL (ref 1.5–4.5)
GLUCOSE SERPL-MCNC: 194 MG/DL (ref 65–99)
HBA1C MFR BLD: 7.7 % (ref 4.8–5.6)
HCT VFR BLD AUTO: 38.8 % (ref 34–46.6)
HDLC SERPL-MCNC: 57 MG/DL
HGB BLD-MCNC: 12.2 G/DL (ref 11.1–15.9)
HSV2 IGG SER IA-ACNC: 17.6 INDEX (ref 0–0.9)
INTERPRETATION, 910389: NORMAL
INTERPRETATION: NORMAL
LDLC SERPL CALC-MCNC: 70 MG/DL (ref 0–99)
Lab: NORMAL
MCH RBC QN AUTO: 26.9 PG (ref 26.6–33)
MCHC RBC AUTO-ENTMCNC: 31.4 G/DL (ref 31.5–35.7)
MCV RBC AUTO: 86 FL (ref 79–97)
PDF IMAGE, 910387: NORMAL
PLATELET # BLD AUTO: 314 X10E3/UL (ref 150–379)
POTASSIUM SERPL-SCNC: 4.7 MMOL/L (ref 3.5–5.2)
PROT SERPL-MCNC: 7.6 G/DL (ref 6–8.5)
RBC # BLD AUTO: 4.53 X10E6/UL (ref 3.77–5.28)
SODIUM SERPL-SCNC: 148 MMOL/L (ref 134–144)
TRIGL SERPL-MCNC: 80 MG/DL (ref 0–149)
VLDLC SERPL CALC-MCNC: 16 MG/DL (ref 5–40)
WBC # BLD AUTO: 4.4 X10E3/UL (ref 3.4–10.8)

## 2018-02-05 NOTE — PROGRESS NOTES
Good cholesterol control  Kidney function better than 7 months ago, but slightly decreased since 2 months ago -- still would recommend kidney specialist  Normal liver function tests  No anemia  Much better glucose control    Blood work shows evidence of herpes simplex type 2 virus (genital herpes)

## 2018-02-09 ENCOUNTER — HOSPITAL ENCOUNTER (OUTPATIENT)
Dept: MAMMOGRAPHY | Age: 63
Discharge: HOME | End: 2018-02-09
Attending: FAMILY MEDICINE
Payer: MEDICARE

## 2018-02-09 DIAGNOSIS — Z12.39 SCREENING BREAST EXAMINATION: ICD-10-CM

## 2018-02-09 PROCEDURE — 77067 SCR MAMMO BI INCL CAD: CPT

## 2018-02-12 RX ORDER — GABAPENTIN 300 MG/1
CAPSULE ORAL
Qty: 90 CAP | Refills: 0 | Status: SHIPPED | OUTPATIENT
Start: 2018-02-12 | End: 2018-05-13 | Stop reason: SDUPTHER

## 2018-02-14 DIAGNOSIS — E11.42 DIABETIC POLYNEUROPATHY ASSOCIATED WITH TYPE 2 DIABETES MELLITUS (HCC): ICD-10-CM

## 2018-02-14 NOTE — TELEPHONE ENCOUNTER
Requested Prescriptions     Pending Prescriptions Disp Refills    traMADol (ULTRAM) 50 mg tablet 360 Tab 1     Sig: Take 1 Tab by mouth every six (6) hours as needed for Pain. Max Daily Amount: 200 mg.  Indications: NEUROPATHIC PAIN

## 2018-02-15 RX ORDER — TRAMADOL HYDROCHLORIDE 50 MG/1
50 TABLET ORAL
Qty: 360 TAB | Refills: 1 | Status: SHIPPED | OUTPATIENT
Start: 2018-02-15 | End: 2018-08-30 | Stop reason: SDUPTHER

## 2018-02-20 DIAGNOSIS — R11.2 NAUSEA AND VOMITING, INTRACTABILITY OF VOMITING NOT SPECIFIED, UNSPECIFIED VOMITING TYPE: Primary | ICD-10-CM

## 2018-02-20 RX ORDER — ONDANSETRON 4 MG/1
4 TABLET, FILM COATED ORAL
Qty: 30 TAB | Refills: 0 | Status: SHIPPED | OUTPATIENT
Start: 2018-02-20 | End: 2018-12-01

## 2018-02-21 DIAGNOSIS — R59.9 ADENOPATHY: Primary | ICD-10-CM

## 2018-03-22 ENCOUNTER — HOSPITAL ENCOUNTER (OUTPATIENT)
Dept: ULTRASOUND IMAGING | Age: 63
Discharge: HOME | End: 2018-03-22
Attending: FAMILY MEDICINE
Payer: MEDICARE

## 2018-03-22 DIAGNOSIS — R59.9 ADENOPATHY: ICD-10-CM

## 2018-03-22 PROCEDURE — 76536 US EXAM OF HEAD AND NECK: CPT

## 2018-03-22 NOTE — LETTER
4/3/2018 2:19 PM 
 
Ms. Magdy Villarreal [address] [address] [address] [facility] [phone] Dear Magdy Villarreal: 
 
Please find your most recent results below. Resulted Orders US THYROID/PARATHYROID/SOFT TISS Narrative EXAM:  US THYROID/PARATHYROID/SOFT TISS INDICATION: Neck soft tissue swelling. COMPARISON: None. TECHNIQUE: Real-time sonography of the neck soft tissues was performed with a 
high frequency linear transducer. Multiple static images were obtained. FINDINGS: 
There are bilateral submandibular physiologic lymph nodes. A right submandibular 
lymph node measures 1.7 x 0.9 x 0.7 cm. A left submandibular lymph node measures 0.8 x 0.7 x 0.5 cm. No evidence of pathologic lymph node. No mass or cyst. 
  
 Impression IMPRESSION:  
 
Normal bilateral physiologic submandibular lymph nodes. No lymphadenopathy by 
imaging size criteria. RECOMMENDATIONS: 
 
Normal neck ultrasound Please call me if you have any questions: [phone] Sincerely, [facility] US 2

## 2018-03-22 NOTE — LETTER
4/3/2018 2:20 PM 
 
Ms. Sharee Loredo [address] [facility] [address] Dear Sharee Loredo: 
 
Please find your most recent results below. Resulted Orders US THYROID/PARATHYROID/SOFT TISS Narrative EXAM:  US THYROID/PARATHYROID/SOFT TISS INDICATION: Neck soft tissue swelling. COMPARISON: None. TECHNIQUE: Real-time sonography of the neck soft tissues was performed with a 
high frequency linear transducer. Multiple static images were obtained. FINDINGS: 
There are bilateral submandibular physiologic lymph nodes. A right submandibular 
lymph node measures 1.7 x 0.9 x 0.7 cm. A left submandibular lymph node measures 0.8 x 0.7 x 0.5 cm. No evidence of pathologic lymph node. No mass or cyst. 
  
 Impression IMPRESSION:  
 
Normal bilateral physiologic submandibular lymph nodes. No lymphadenopathy by 
imaging size criteria. RECOMMENDATIONS: 
 
Normal neck ultrasound Please call me if you have any questions: [phone] Sincerely, [facility] US 2

## 2018-04-23 DIAGNOSIS — E78.00 HYPERCHOLESTEROLEMIA: ICD-10-CM

## 2018-04-25 RX ORDER — ROSUVASTATIN CALCIUM 10 MG/1
TABLET, COATED ORAL
Qty: 90 TAB | Refills: 0 | Status: SHIPPED | OUTPATIENT
Start: 2018-04-25 | End: 2018-07-24 | Stop reason: SDUPTHER

## 2018-04-30 ENCOUNTER — TELEPHONE (OUTPATIENT)
Dept: FAMILY MEDICINE CLINIC | Age: 63
End: 2018-04-30

## 2018-04-30 DIAGNOSIS — E11.8 TYPE 2 DIABETES MELLITUS WITH COMPLICATION, UNSPECIFIED WHETHER LONG TERM INSULIN USE: Primary | ICD-10-CM

## 2018-05-11 ENCOUNTER — TELEPHONE (OUTPATIENT)
Dept: ENDOCRINOLOGY | Age: 63
End: 2018-05-11

## 2018-05-11 DIAGNOSIS — E11.65 TYPE 2 DIABETES MELLITUS WITH HYPERGLYCEMIA, UNSPECIFIED WHETHER LONG TERM INSULIN USE (HCC): Primary | ICD-10-CM

## 2018-05-14 RX ORDER — GABAPENTIN 300 MG/1
CAPSULE ORAL
Qty: 90 CAP | Refills: 0 | Status: SHIPPED | OUTPATIENT
Start: 2018-05-14 | End: 2018-08-12 | Stop reason: SDUPTHER

## 2018-05-18 ENCOUNTER — HOSPITAL ENCOUNTER (OUTPATIENT)
Dept: LAB | Age: 63
Discharge: HOME | End: 2018-05-18
Payer: MEDICARE

## 2018-05-18 PROCEDURE — 36415 COLL VENOUS BLD VENIPUNCTURE: CPT

## 2018-05-18 PROCEDURE — 80053 COMPREHEN METABOLIC PANEL: CPT

## 2018-05-18 PROCEDURE — 80061 LIPID PANEL: CPT

## 2018-05-18 PROCEDURE — 83036 HEMOGLOBIN GLYCOSYLATED A1C: CPT

## 2018-05-18 PROCEDURE — 82043 UR ALBUMIN QUANTITATIVE: CPT

## 2018-05-25 ENCOUNTER — OFFICE VISIT (OUTPATIENT)
Dept: ENDOCRINOLOGY | Age: 63
End: 2018-05-25

## 2018-05-25 ENCOUNTER — HOSPITAL ENCOUNTER (OUTPATIENT)
Dept: LAB | Age: 63
Discharge: HOME | End: 2018-05-25
Payer: MEDICARE

## 2018-05-25 VITALS
OXYGEN SATURATION: 96 % | DIASTOLIC BLOOD PRESSURE: 71 MMHG | HEIGHT: 63 IN | SYSTOLIC BLOOD PRESSURE: 121 MMHG | HEART RATE: 92 BPM | WEIGHT: 186.9 LBS | RESPIRATION RATE: 14 BRPM | BODY MASS INDEX: 33.12 KG/M2 | TEMPERATURE: 97.1 F

## 2018-05-25 DIAGNOSIS — I10 ESSENTIAL HYPERTENSION: ICD-10-CM

## 2018-05-25 DIAGNOSIS — E11.21 TYPE 2 DIABETES MELLITUS WITH DIABETIC NEPHROPATHY, WITHOUT LONG-TERM CURRENT USE OF INSULIN (HCC): Primary | ICD-10-CM

## 2018-05-25 DIAGNOSIS — E78.00 HYPERCHOLESTEROLEMIA: ICD-10-CM

## 2018-05-25 PROCEDURE — 82043 UR ALBUMIN QUANTITATIVE: CPT

## 2018-05-25 NOTE — PROGRESS NOTES
Trev Bryant is a 58 y.o. female here for   Chief Complaint   Patient presents with    Diabetes       Functional glucose monitor and record keeping system? - yes  Eye exam within last year? - on file  Foot exam within last year? - on file    1. Have you been to the ER, urgent care clinic since your last visit? Hospitalized since your last visit? -no    2. Have you seen or consulted any other health care providers outside of the [address] [place] [address] since your last visit?   Include any pap smears or colon screening.-no

## 2018-05-25 NOTE — MR AVS SNAPSHOT
[address] [address] [address] [id number] 
[phone] Patient: Rosana Duran MRN: [id number] BDA:[birth year] Visit Information Date & Time Provider Department Dept. Phone Encounter #  
 5/25/2018 10:30 AM Angel Luis Gil MD [facility] [phone] [id number] Follow-up Instructions Return in about 4 months (around 9/25/2018). Upcoming Health Maintenance Date Due FOBT Q 1 YEAR AGE 50-75 10/30/2005 ZOSTER VACCINE AGE 60> 8/30/2015 Influenza Age 5 to Adult 8/1/2018 MEDICARE YEARLY EXAM 10/11/2018 HEMOGLOBIN A1C Q6M 11/18/2018 FOOT EXAM Q1 1/27/2019 EYE EXAM RETINAL OR DILATED Q1 4/30/2019 MICROALBUMIN Q1 5/18/2019 LIPID PANEL Q1 5/18/2019 BREAST CANCER SCRN MAMMOGRAM 2/9/2020 PAP AKA CERVICAL CYTOLOGY 2/2/2021 DTaP/Tdap/Td series (2 - Td) 2/2/2028 Allergies as of 5/25/2018  Review Complete On: 5/25/2018 By: Angel Luis Gil MD  
  
 Severity Noted Reaction Type Reactions Aspirin  06/28/2010    Hives Tolerates ibuprofen and naproxen Clindamycin  05/16/2011    Rash Hydrocodone  12/31/2013    Nausea and Vomiting Pt stated she is not allergic Current Immunizations  Reviewed on 12/13/2016 Name Date Pneumococcal Polysaccharide (PPSV-23) 12/13/2016 Tdap 2/2/2018 Not reviewed this visit You Were Diagnosed With   
  
 Codes Comments Type 2 diabetes mellitus with diabetic nephropathy, without long-term current use of insulin (HCC)    -  Primary ICD-10-CM: E11.21 
ICD-9-CM: 250.40, 583.81 Hypercholesterolemia     ICD-10-CM: E78.00 ICD-9-CM: 272.0 Essential hypertension     ICD-10-CM: I10 
ICD-9-CM: 401.9 Vitals BP Pulse Temp Resp Height(growth percentile) Weight(growth percentile)  121/71 (BP 1 Location: Right arm, BP Patient Position: Sitting) 92 97.1 °F (36.2 °C) (Oral) 14 5' 3\" (1.6 m) 186 lb 14.4 oz (84.8 kg) LMP SpO2 BMI OB Status Smoking Status 01/01/2008 (LMP Unknown) 96% 33.11 kg/m2 Postmenopausal Never Smoker Vitals History BMI and BSA Data Body Mass Index Body Surface Area  
 33.11 kg/m 2 1.94 m 2 Preferred Pharmacy Pharmacy Name Phone [address] [phone] Your Updated Medication List  
  
   
This list is accurate as of 5/25/18 11:34 AM.  Always use your most recent med list. amLODIPine-benazepril 10-20 mg per capsule Commonly known as:  Ry Copas Take 1 Cap by mouth daily. dulaglutide 1.5 mg/0.5 mL sub-q pen Commonly known as:  TRULICITY  
0.5 mL by SubCUTAneous route every seven (7) days. FISH OIL 1,000 mg Cap Generic drug:  omega-3 fatty acids-vitamin e Take 1 Cap by mouth daily. gabapentin 300 mg capsule Commonly known as:  NEURONTIN  
TAKE 1 CAPSULE BY MOUTH EVERY NIGHT  
  
 glipiZIDE 10 mg tablet Commonly known as:  Craig Half Take 1 Tab by mouth two (2) times a day. glucose blood VI test strips strip Commonly known as:  [address] Check BS once weekly  
  
 hydroCHLOROthiazide 25 mg tablet Commonly known as:  HYDRODIURIL  
TAKE 1 TABLET BY MOUTH DAILY  
  
 ibuprofen 200 mg tablet Commonly known as:  MOTRIN Take 400 mg by mouth every six (6) hours as needed for Pain.  
  
 metFORMIN 1,000 mg tablet Commonly known as:  GLUCOPHAGE Take 1 Tab by mouth two (2) times daily (with meals). methocarbamol 750 mg tablet Commonly known as:  ROBAXIN Take 750 mg by mouth as needed. ondansetron hcl 4 mg tablet Commonly known as:  Amanda Jose Take 1 Tab by mouth every six (6) hours as needed for Nausea. rosuvastatin 10 mg tablet Commonly known as:  CRESTOR  
TAKE 1 TABLET BY MOUTH DAILY traMADol 50 mg tablet Commonly known as:  ULTRAM  
Take 1 Tab by mouth every six (6) hours as needed for Pain. Max Daily Amount: 200 mg. Indications: NEUROPATHIC PAIN Follow-up Instructions Return in about 4 months (around 9/25/2018). Introducing [facility] & HEALTH SERVICES! Dear Prince Harley: Thank you for requesting a Pagido account. Our records indicate that you already have an active Pagido account. You can access your account anytime at [email or web address] Did you know that you can access your hospital and ER discharge instructions at any time in Pagido? You can also review all of your test results from your hospital stay or ER visit. Additional Information If you have questions, please visit the Frequently Asked Questions section of the Pagido website at [email or web address] Remember, Pagido is NOT to be used for urgent needs. For medical emergencies, dial 911. Now available from your iPhone and Android! Please provide this summary of care documentation to your next provider. Your primary care clinician is listed as [address] [place] [address]. If you have any questions after today's visit, please call [phone].

## 2018-05-25 NOTE — PROGRESS NOTES
[facility]               Linda Merida MD        [address] 78 [phone] Fax [phone]               Patient Information  Date:5/25/2018  Name : Ashlie Arora 58 y.o.     YOB: 1955         Referred by: Darryn Cleaning MD         Chief Complaint   Patient presents with    Diabetes       History of Present Illness: Ashlie Arora is a 58 y.o. female here for follow-up of  Type 2 Diabetes Mellitus. Type 2 Diabetes was diagnosed 10 years . Daylin Gaitan End organ effects of diabetes: nephropathy, neuropathy . Cardiovascular risk factors: diabetes mellitus, post-menopausal   She did not bring the log  She is on Trulicity. Diet is not healthy, admits to eating high carb diet which is not able to control. Planning to resume exercise      Wt Readings from Last 3 Encounters:   05/25/18 186 lb 14.4 oz (84.8 kg)   02/02/18 189 lb (85.7 kg)   01/25/18 190 lb 12.8 oz (86.5 kg)       BP Readings from Last 3 Encounters:   05/25/18 121/71   02/02/18 129/77   01/25/18 121/71           Past Medical History:   Diagnosis Date    Diabetes ([facility] 1995    Genital herpes simplex type 2     GERD (gastroesophageal reflux disease)     no medication prescribed    Hypercholesterolemia     Hypertension     Narcolepsy     Neuropathy in diabetes ([facility]      Current Outpatient Prescriptions   Medication Sig    gabapentin (NEURONTIN) 300 mg capsule TAKE 1 CAPSULE BY MOUTH EVERY NIGHT    rosuvastatin (CRESTOR) 10 mg tablet TAKE 1 TABLET BY MOUTH DAILY    ondansetron hcl (ZOFRAN) 4 mg tablet Take 1 Tab by mouth every six (6) hours as needed for Nausea.  traMADol (ULTRAM) 50 mg tablet Take 1 Tab by mouth every six (6) hours as needed for Pain. Max Daily Amount: 200 mg. Indications: NEUROPATHIC PAIN    dulaglutide (TRULICITY) 1.5 XP/5.8 mL sub-q pen 0.5 mL by SubCUTAneous route every seven (7) days.     metFORMIN (GLUCOPHAGE) 1,000 mg tablet Take 1 Tab by mouth two (2) times daily (with meals). (Patient taking differently: Take 1,000 mg by mouth three (3) times daily.)    glipiZIDE (GLUCOTROL) 10 mg tablet Take 1 Tab by mouth two (2) times a day.  amLODIPine-benazepril (LOTREL) 10-20 mg per capsule Take 1 Cap by mouth daily.  hydroCHLOROthiazide (HYDRODIURIL) 25 mg tablet TAKE 1 TABLET BY MOUTH DAILY    glucose blood VI test strips (ACCU-CHEK SMARTVIEW TEST STRIP) strip Check BS once weekly    omega-3 fatty acids-vitamin e (FISH OIL) 1,000 mg Cap Take 1 Cap by mouth daily.  methocarbamol (ROBAXIN) 750 mg tablet Take 750 mg by mouth as needed.  ibuprofen (MOTRIN) 200 mg tablet Take 400 mg by mouth every six (6) hours as needed for Pain. No current facility-administered medications for this visit. Allergies   Allergen Reactions    Aspirin Hives     Tolerates ibuprofen and naproxen    Clindamycin Rash    Hydrocodone Nausea and Vomiting     Pt stated she is not allergic       Review of Systems:  All 10 systems reviewed and are negative other than mentioned in HPI    Physical Examination:   Blood pressure 121/71, pulse 92, temperature 97.1 °F (36.2 °C), temperature source Oral, resp. rate 14, height 5' 3\" (1.6 m), weight 186 lb 14.4 oz (84.8 kg), last menstrual period 01/01/2008, SpO2 96 %. Estimated body mass index is 33.11 kg/(m^2) as calculated from the following:    Height as of this encounter: 5' 3\" (1.6 m). -   Weight as of this encounter: 186 lb 14.4 oz (84.8 kg).   - General: pleasant, no distress, good eye contact  - HEENT: no pallor, no periorbital edema, EOMI  - Neck: supple,   -   - Musculoskeletal: no proximal muscle weakness in upper or lower extremities  - Integumentary: ,no edema,   - Neurological: ,alert and oriented  - Psychiatric: normal mood and affect  - Skin: color, texture, turgor normal.     Diabetic foot exam: January 2018    Left:    Vibratory sensation absent    Filament test absent sensation with micro filament   Pulse DP: 1 +    Deformities: none     Right:    Vibratory sensation absent   Filament test absent sensation with micro filament   Pulse DP: 1+                     Deformities: none     Data Reviewed:     [] Glucose records reviewed. [] See glucose records for details (to be scanned). [] A1C  [] Reviewed labs    Lab Results   Component Value Date/Time    Hemoglobin A1c 7.0 (H) 05/18/2018 09:25 AM    Hemoglobin A1c 7.7 (H) 02/02/2018 11:44 AM    Hemoglobin A1c 7.6 (H) 11/29/2017 12:00 AM    Glucose 104 (H) 05/18/2018 09:25 AM    Glucose (POC) 120 (H) 10/14/2015 09:51 AM    Microalbumin/Creat ratio (mg/g creat) 11 06/03/2010 12:37 PM    Microalb/Creat ratio (ug/mg creat.) 14.2 11/29/2017 12:00 AM    Microalbumin,urine random 1.99 06/03/2010 12:37 PM    LDL,Direct 74 11/29/2017 12:00 AM    LDL, calculated 60 05/18/2018 09:25 AM    Creatinine (POC) 0.8 03/17/2014 03:37 PM    Creatinine 1.09 (H) 05/18/2018 09:25 AM      Lab Results   Component Value Date/Time    Cholesterol, total 128 05/18/2018 09:25 AM    HDL Cholesterol 52 05/18/2018 09:25 AM    LDL,Direct 74 11/29/2017 12:00 AM    LDL, calculated 60 05/18/2018 09:25 AM    Triglyceride 79 05/18/2018 09:25 AM    CHOL/HDL Ratio 2.6 03/18/2010 10:05 AM     Lab Results   Component Value Date/Time    ALT (SGPT) 17 05/18/2018 09:25 AM    AST (SGOT) 15 05/18/2018 09:25 AM    Alk.  phosphatase 86 05/18/2018 09:25 AM    Bilirubin, total <0.2 05/18/2018 09:25 AM    Albumin 4.1 05/18/2018 09:25 AM    Protein, total 7.4 05/18/2018 09:25 AM    PLATELET 477 95/63/8711 11:44 AM       Lab Results   Component Value Date/Time    GFR est non-AA 55 (L) 05/18/2018 09:25 AM    GFRNA, POC >60 03/17/2014 03:37 PM    GFR est AA 63 05/18/2018 09:25 AM    GFRAA, POC >60 03/17/2014 03:37 PM    Creatinine 1.09 (H) 05/18/2018 09:25 AM    Creatinine (POC) 0.8 03/17/2014 03:37 PM    BUN 20 05/18/2018 09:25 AM    BUN (POC) 14 11/30/2013 02:22 PM    Sodium 147 (H) 05/18/2018 09:25 AM Sodium (POC) 140 11/30/2013 02:22 PM    Potassium 4.4 05/18/2018 09:25 AM    Potassium (POC) 3.5 11/30/2013 02:22 PM    Chloride 103 05/18/2018 09:25 AM    Chloride (POC) 104 11/30/2013 02:22 PM    CO2 28 05/18/2018 09:25 AM    Magnesium 1.9 01/03/2014 03:12 AM               Assessment/Plan:   1. Type 2 diabetes mellitus with diabetic nephropathy, without long-term current use of insulin ([facility]    2. Hypercholesterolemia    3. Essential hypertension        1. Type 2 Diabetes Mellitus with nephropathy,neuropathy,  Lab Results   Component Value Date/Time    Hemoglobin A1c 7.0 (H) 05/18/2018 09:25 AM    Hemoglobin A1c (POC) 9.4 06/13/2017 04:27 PM   Controlled  Metformin 1 tab in AM and 1 tab dinner . Glipizide 10 mg in AM and 10 mg before dinner. Trulicity weekly   Discussed the relationship of food  in relation to blood glucose control, agreed to cut down    2. HTN : Continue current therapy     3. Hyperlipidemia : Continue statin. 4.Obesity:Body mass index is 33.11 kg/(m^2). Discussed about the importance of exercise and carbohydrate portion control. There are no Patient Instructions on file for this visit. Follow-up Disposition: Not on File    Thank you for allowing me to participate in the care of this patient.     Marck Hagen MD      Patient verbalized understanding

## 2018-05-29 LAB
ALBUMIN/CREAT UR: 19.5 MG/G CREAT (ref 0–30)
CREAT UR-MCNC: 145 MG/DL
MICROALBUMIN UR-MCNC: 28.3 UG/ML

## 2018-07-24 DIAGNOSIS — E78.00 HYPERCHOLESTEROLEMIA: ICD-10-CM

## 2018-07-31 RX ORDER — ROSUVASTATIN CALCIUM 10 MG/1
TABLET, COATED ORAL
Qty: 90 TAB | Refills: 0 | Status: SHIPPED | OUTPATIENT
Start: 2018-07-31 | End: 2018-10-27 | Stop reason: SDUPTHER

## 2018-08-26 DIAGNOSIS — E11.42 DIABETIC POLYNEUROPATHY ASSOCIATED WITH TYPE 2 DIABETES MELLITUS (HCC): ICD-10-CM

## 2018-08-26 NOTE — TELEPHONE ENCOUNTER
Patient needs a refill of the following  Requested Prescriptions     Pending Prescriptions Disp Refills    traMADol (ULTRAM) 50 mg tablet 360 Tab 1     Sig: Take 1 Tab by mouth every six (6) hours as needed for Pain. Max Daily Amount: 200 mg.  Indications: NEUROPATHIC PAIN

## 2018-08-27 RX ORDER — GABAPENTIN 300 MG/1
CAPSULE ORAL
Qty: 90 CAP | Refills: 0 | Status: SHIPPED | OUTPATIENT
Start: 2018-08-27 | End: 2018-10-04 | Stop reason: SDUPTHER

## 2018-08-27 NOTE — TELEPHONE ENCOUNTER
Please call patient and offer her an appt. She needs to be seen for controlled substance refill.  Jeffery

## 2018-08-29 RX ORDER — TRAMADOL HYDROCHLORIDE 50 MG/1
50 TABLET ORAL
Qty: 360 TAB | Refills: 1 | Status: CANCELLED | OUTPATIENT
Start: 2018-08-29

## 2018-08-30 ENCOUNTER — HOSPITAL ENCOUNTER (OUTPATIENT)
Dept: LAB | Age: 63
Discharge: HOME | End: 2018-08-30
Payer: MEDICARE

## 2018-08-30 ENCOUNTER — OFFICE VISIT (OUTPATIENT)
Dept: FAMILY MEDICINE CLINIC | Age: 63
End: 2018-08-30

## 2018-08-30 VITALS
SYSTOLIC BLOOD PRESSURE: 122 MMHG | HEIGHT: 63 IN | BODY MASS INDEX: 33.31 KG/M2 | TEMPERATURE: 98.2 F | RESPIRATION RATE: 18 BRPM | HEART RATE: 90 BPM | DIASTOLIC BLOOD PRESSURE: 81 MMHG | WEIGHT: 188 LBS

## 2018-08-30 DIAGNOSIS — E13.40 NEUROPATHY DUE TO SECONDARY DIABETES (HCC): Primary | ICD-10-CM

## 2018-08-30 DIAGNOSIS — G89.4 CHRONIC PAIN SYNDROME: ICD-10-CM

## 2018-08-30 DIAGNOSIS — E11.42 DIABETIC POLYNEUROPATHY ASSOCIATED WITH TYPE 2 DIABETES MELLITUS (HCC): ICD-10-CM

## 2018-08-30 PROCEDURE — 80307 DRUG TEST PRSMV CHEM ANLYZR: CPT

## 2018-08-30 PROCEDURE — 80361 OPIATES 1 OR MORE: CPT

## 2018-08-30 PROCEDURE — 80365 DRUG SCREENING OXYCODONE: CPT

## 2018-08-30 RX ORDER — TRAMADOL HYDROCHLORIDE 50 MG/1
50 TABLET ORAL
Qty: 120 TAB | Refills: 0 | Status: SHIPPED | OUTPATIENT
Start: 2018-08-30 | End: 2018-09-29

## 2018-08-30 NOTE — PROGRESS NOTES
Identified Patient with two Patient identifiers (Name and ). Two Patient Identifiers confirmed. Reviewed record in preparation for visit and have obtained necessary documentation. Chief Complaint   Patient presents with    Pain (Chronic)     chronic pain in feet, needs refills of Tramadol and gabapentin       Visit Vitals    /81 (BP 1 Location: Right arm, BP Patient Position: Sitting)    Pulse 90    Temp 98.2 °F (36.8 °C) (Oral)    Resp 18    Ht 5' 3\" (1.6 m)    Wt 188 lb (85.3 kg)    BMI 33.3 kg/m2       1. Have you been to the ER, urgent care clinic since your last visit? Hospitalized since your last visit? No    2. Have you seen or consulted any other health care providers outside of the [address] [place] [address] since your last visit? Include any pap smears or colon screening.  No

## 2018-08-30 NOTE — MR AVS SNAPSHOT
[address] 
[phone] Patient: Marli Olivia MRN: [id number] ZPM:40/01/3413 Visit Information Date & Time Provider Department Dept. Phone Encounter #  
 8/30/2018 10:30 AM Tami Miranda, Evelyn Echavarria [phone] [id number] Follow-up Instructions Return in about 4 weeks (around 9/27/2018) for Med refill with Dr. Yessenia Nielson. Your Appointments 10/3/2018  9:45 AM  
ROUTINE CARE with Krupa Hopper MD  
[facility] Appt Note: 4 mon fu DM  
 [address] [facility] [address]  
[phone]  
  
   
 [address] [place] [address] Upcoming Health Maintenance Date Due FOBT Q 1 YEAR AGE 50-75 10/30/2005 ZOSTER VACCINE AGE 60> 8/30/2015 Influenza Age 5 to Adult 8/1/2018 MEDICARE YEARLY EXAM 10/11/2018 HEMOGLOBIN A1C Q6M 11/18/2018 FOOT EXAM Q1 1/27/2019 EYE EXAM RETINAL OR DILATED Q1 4/30/2019 LIPID PANEL Q1 5/18/2019 MICROALBUMIN Q1 5/25/2019 BREAST CANCER SCRN MAMMOGRAM 2/9/2020 PAP AKA CERVICAL CYTOLOGY 2/2/2021 DTaP/Tdap/Td series (2 - Td) 2/2/2028 Allergies as of 8/30/2018  Review Complete On: 8/30/2018 By: Tami Miranda MD  
  
 Severity Noted Reaction Type Reactions Aspirin  06/28/2010    Hives Tolerates ibuprofen and naproxen Clindamycin  05/16/2011    Rash Hydrocodone  12/31/2013    Nausea and Vomiting Patient stated she is not allergic at all. Current Immunizations  Reviewed on 12/13/2016 Name Date Pneumococcal Polysaccharide (PPSV-23) 12/13/2016 Tdap 2/2/2018 Not reviewed this visit You Were Diagnosed With   
  
 Codes Comments Neuropathy due to secondary diabetes ([facility]    -  Primary ICD-10-CM: I50.98 ICD-9-CM: 249.60, 357.2 Chronic pain syndrome     ICD-10-CM: G89.4 ICD-9-CM: 338.4 Diabetic polyneuropathy associated with type 2 diabetes mellitus ([facility]     ICD-10-CM: E11.42 
ICD-9-CM: 250.60, 357.2 Vitals BP Pulse Temp Resp Height(growth percentile) Weight(growth percentile) 122/81 (BP 1 Location: Right arm, BP Patient Position: Sitting) 90 98.2 °F (36.8 °C) (Oral) 18 5' 3\" (1.6 m) 188 lb (85.3 kg) LMP BMI OB Status Smoking Status 01/01/2008 (LMP Unknown) 33.3 kg/m2 Postmenopausal Never Smoker Vitals History BMI and BSA Data Body Mass Index Body Surface Area  
 33.3 kg/m 2 1.95 m 2 Preferred Pharmacy Pharmacy Name Phone [address] [phone] Your Updated Medication List  
  
   
This list is accurate as of 8/30/18 10:57 AM.  Always use your most recent med list. amLODIPine-benazepril 10-20 mg per capsule Commonly known as:  Renee Plants Take 1 Cap by mouth daily. dulaglutide 1.5 mg/0.5 mL sub-q pen Commonly known as:  TRULICITY  
0.5 mL by SubCUTAneous route every seven (7) days. FISH OIL 1,000 mg Cap Generic drug:  omega-3 fatty acids-vitamin e Take 1 Cap by mouth daily. gabapentin 300 mg capsule Commonly known as:  NEURONTIN  
TAKE 1 CAPSULE BY MOUTH EVERY NIGHT  
  
 glipiZIDE 10 mg tablet Commonly known as:  Dolph Siad Take 1 Tab by mouth two (2) times a day. glucose blood VI test strips strip Commonly known as:  [address] Check BS once weekly  
  
 hydroCHLOROthiazide 25 mg tablet Commonly known as:  HYDRODIURIL  
TAKE 1 TABLET BY MOUTH DAILY  
  
 ibuprofen 200 mg tablet Commonly known as:  MOTRIN Take 400 mg by mouth every six (6) hours as needed for Pain.  
  
 metFORMIN 1,000 mg tablet Commonly known as:  GLUCOPHAGE Take 1 Tab by mouth two (2) times daily (with meals). methocarbamol 750 mg tablet Commonly known as:  ROBAXIN  
 Take 750 mg by mouth as needed. ondansetron hcl 4 mg tablet Commonly known as:  Rashmi Footman Take 1 Tab by mouth every six (6) hours as needed for Nausea. rosuvastatin 10 mg tablet Commonly known as:  CRESTOR  
TAKE 1 TABLET BY MOUTH DAILY  
  
 traMADol 50 mg tablet Commonly known as:  ULTRAM  
Take 1 Tab by mouth every six (6) hours as needed for Pain for up to 30 days. Max Daily Amount: 200 mg. Indications: NEUROPATHIC PAIN Prescriptions Printed Refills  
 traMADol (ULTRAM) 50 mg tablet 0 Sig: Take 1 Tab by mouth every six (6) hours as needed for Pain for up to 30 days. Max Daily Amount: 200 mg. Indications: NEUROPATHIC PAIN Class: Print Route: Oral  
  
We Performed the Following Onur Katherine Hodges (MW) [[id number] Custom] Follow-up Instructions Return in about 4 weeks (around 9/27/2018) for Med refill with Dr. Carter Guerrero. Patient Instructions Please follow up with Dr. Carter Guerrero for your future medication refills. Introducing [facility]! Dear Itzel More: Thank you for requesting a [employer] account. Our records indicate that you already have an active [employer] account. You can access your account anytime at [email or web address]. [employer] Did you know that you can access your hospital and ER discharge instructions at any time in [employer]? You can also review all of your test results from your hospital stay or ER visit. Additional Information If you have questions, please visit the Frequently Asked Questions section of the [employer] website at [email or web address]. [employer]/. Remember, [employer] is NOT to be used for urgent needs. For medical emergencies, dial 911. Now available from your iPhone and Android! Please provide this summary of care documentation to your next provider. Your primary care clinician is listed as [facility], .  If you have any questions after today's visit, please call [phone].

## 2018-08-30 NOTE — PROGRESS NOTES
History of Present Illness:     Chief Complaint   Patient presents with    Pain (Chronic)     chronic pain in feet, needs refills of Tramadol and gabapentin     Pt is a 58y.o. year old female    Presents to clinic for medication refill. No show from Dr. Sampson Betancourt.  Requesting medication refills for Gabapentin and Tramadol. Takes chronic pain medication for her diabetic neuropathy. She ran out yesterday. Takes Tramadol 50mg every 6 hours for her pain. Endocrinology follows her for her diabetes. Past Medical History:   Diagnosis Date    Diabetes ([facility] 1995    Genital herpes simplex type 2     GERD (gastroesophageal reflux disease)     no medication prescribed    Hypercholesterolemia     Hypertension     Narcolepsy     Neuropathy in diabetes [facility])          Current Outpatient Prescriptions on File Prior to Visit   Medication Sig Dispense Refill    gabapentin (NEURONTIN) 300 mg capsule TAKE 1 CAPSULE BY MOUTH EVERY NIGHT 90 Cap 0    rosuvastatin (CRESTOR) 10 mg tablet TAKE 1 TABLET BY MOUTH DAILY 90 Tab 0    ondansetron hcl (ZOFRAN) 4 mg tablet Take 1 Tab by mouth every six (6) hours as needed for Nausea. 30 Tab 0    dulaglutide (TRULICITY) 1.5 PJ/0.2 mL sub-q pen 0.5 mL by SubCUTAneous route every seven (7) days. 12 Syringe 3    metFORMIN (GLUCOPHAGE) 1,000 mg tablet Take 1 Tab by mouth two (2) times daily (with meals). (Patient taking differently: Take 1,000 mg by mouth three (3) times daily. ) 180 Tab 3    glipiZIDE (GLUCOTROL) 10 mg tablet Take 1 Tab by mouth two (2) times a day. 180 Tab 3    amLODIPine-benazepril (LOTREL) 10-20 mg per capsule Take 1 Cap by mouth daily. 90 Cap 3    hydroCHLOROthiazide (HYDRODIURIL) 25 mg tablet TAKE 1 TABLET BY MOUTH DAILY 90 Tab 3    glucose blood VI test strips (ACCU-CHEK SMARTVIEW TEST STRIP) strip Check BS once weekly 90 Strip 0    ibuprofen (MOTRIN) 200 mg tablet Take 400 mg by mouth every six (6) hours as needed for Pain.       omega-3 fatty acids-vitamin e (FISH OIL) 1,000 mg Cap Take 1 Cap by mouth daily.  methocarbamol (ROBAXIN) 750 mg tablet Take 750 mg by mouth as needed. 0     No current facility-administered medications on file prior to visit. Allergies: Allergies   Allergen Reactions    Aspirin Hives     Tolerates ibuprofen and naproxen    Clindamycin Rash    Hydrocodone Nausea and Vomiting     Patient stated she is not allergic at all. Review of Systems:  +Pedal edema, foot pain  Denies foot sores or skin changes      Objective:     Vitals:    08/30/18 1029   BP: 122/81   Pulse: 90   Resp: 18   Temp: 98.2 °F (36.8 °C)   TempSrc: Oral   Weight: 188 lb (85.3 kg)   Height: 5' 3\" (1.6 m)       Physical Exam:  General appearance - alert, well appearing, and in no distress and overweight  Extremities - Hyperpigmentation of skin from mid shin to feet. No hair growth on toes. Trace edema. 2+ PT pulses bilaterally. No appreciable sores on foot exam of skin. Recent Labs:  No results found for this or any previous visit (from the past 12 hour(s)). Assessment and Plan:   Pt is a 58y.o. year old female,      ICD-10-CM ICD-9-CM    1. Neuropathy due to secondary diabetes (HCC) E13.40 249.60      357.2    2. Chronic pain syndrome G89.4 338.4 TOXASSURE SELECT 13 (MW)   3. Diabetic polyneuropathy associated with type 2 diabetes mellitus (HCC) E11.42 250.60 traMADol (ULTRAM) 50 mg tablet     357.2      Takes Tramadol and Gabapentin daily for diabetic neuropathy of feet   reviewed and is appropriate  Compliance UDS today    Follow up with Dr. Jennifer Hurt in 1 month for additional refills    Donnell Thompson MD      I have discussed the diagnosis with the patient and the intended plan as seen in the above orders. The patient has received an after-visit summary and questions were answered concerning future plans.

## 2018-09-02 LAB
AMPHETAMINES UR QL SCN: NEGATIVE NG/ML
BARBITURATES UR QL SCN: NEGATIVE NG/ML
BENZODIAZ UR QL SCN: NEGATIVE NG/ML
BZE UR QL SCN: NEGATIVE NG/ML
CANNABINOIDS UR QL SCN: NEGATIVE NG/ML
CREAT UR-MCNC: 77.5 MG/DL (ref 20–300)
FENTANYL+NORFENTANYL UR QL SCN: NEGATIVE PG/ML
MEPERIDINE UR QL: NEGATIVE NG/ML
METHADONE UR QL SCN: NEGATIVE NG/ML
OPIATES UR QL SCN: NEGATIVE NG/ML
OXYCODONE+OXYMORPHONE UR QL SCN: NEGATIVE
PCP UR QL: NEGATIVE NG/ML
PH UR: 5.8 [PH] (ref 4.5–8.9)
PLEASE NOTE:, 733157: ABNORMAL
PROPOXYPH UR QL SCN: NEGATIVE NG/ML
SP GR UR: 1.02
TRAMADOL UR-MCNC: POSITIVE UG/ML

## 2018-09-21 ENCOUNTER — TELEPHONE (OUTPATIENT)
Dept: FAMILY MEDICINE CLINIC | Age: 63
End: 2018-09-21

## 2018-09-21 RX ORDER — METFORMIN HYDROCHLORIDE 1000 MG/1
1000 TABLET ORAL 2 TIMES DAILY WITH MEALS
Qty: 180 TAB | Refills: 3 | Status: CANCELLED | OUTPATIENT
Start: 2018-09-21

## 2018-09-21 NOTE — TELEPHONE ENCOUNTER
----- Message from Sushil Benites sent at 9/21/2018  3:57 PM EDT -----  Regarding: Dr. Sidhu Friends / Telephone   Pt stated [facility] ([phone]) sent over a request to refill pt's Rx for Metformin but has not received a response back. Pt is within one day of being out of her medication.   Fax: pt unsure    Pt's best contact: [phone]

## 2018-09-21 NOTE — TELEPHONE ENCOUNTER
Called the pharmacy to clarify the doctor the prescription was sent to in regards to earlier note in chart on 9/21/18. Pharmacist states the RX is at the pharmacy now form Dr Teodora Tolbert but the patient can not  prescription until 9/25/18. Pharmacist states the insurance will not release the medication to patient until the 25th no matter which Doctor write the RX. Patient could pay for the RX in which it will cost about $81.00. Attempted to call patient to inform her of that information, unable to reach patient LVM to call the office.

## 2018-10-03 ENCOUNTER — OFFICE VISIT (OUTPATIENT)
Dept: ENDOCRINOLOGY | Age: 63
End: 2018-10-03

## 2018-10-03 VITALS
BODY MASS INDEX: 32.48 KG/M2 | HEART RATE: 110 BPM | RESPIRATION RATE: 16 BRPM | HEIGHT: 63 IN | OXYGEN SATURATION: 98 % | WEIGHT: 183.3 LBS | TEMPERATURE: 98.9 F | DIASTOLIC BLOOD PRESSURE: 82 MMHG | SYSTOLIC BLOOD PRESSURE: 137 MMHG

## 2018-10-03 DIAGNOSIS — E11.21 TYPE 2 DIABETES MELLITUS WITH DIABETIC NEPHROPATHY, WITHOUT LONG-TERM CURRENT USE OF INSULIN (HCC): Primary | ICD-10-CM

## 2018-10-03 DIAGNOSIS — E78.00 HYPERCHOLESTEROLEMIA: ICD-10-CM

## 2018-10-03 DIAGNOSIS — E11.65 TYPE 2 DIABETES MELLITUS WITH HYPERGLYCEMIA, UNSPECIFIED WHETHER LONG TERM INSULIN USE (HCC): Primary | ICD-10-CM

## 2018-10-03 DIAGNOSIS — I10 ESSENTIAL HYPERTENSION: ICD-10-CM

## 2018-10-03 PROBLEM — E11.40 TYPE 2 DIABETES MELLITUS WITH DIABETIC NEUROPATHY (HCC): Status: ACTIVE | Noted: 2018-10-03

## 2018-10-03 LAB
GLUCOSE POC: 207 MG/DL
HBA1C MFR BLD HPLC: 7.2 %

## 2018-10-03 RX ORDER — TRAMADOL HYDROCHLORIDE 50 MG/1
TABLET ORAL
Qty: 120 TAB | Refills: 0 | OUTPATIENT
Start: 2018-10-03

## 2018-10-03 RX ORDER — METFORMIN HYDROCHLORIDE 1000 MG/1
1000 TABLET ORAL 2 TIMES DAILY WITH MEALS
Qty: 180 TAB | Refills: 3 | Status: SHIPPED | OUTPATIENT
Start: 2018-10-03 | End: 2018-10-04 | Stop reason: SDUPTHER

## 2018-10-03 RX ORDER — BISMUTH SUBSALICYLATE 262 MG
1 TABLET,CHEWABLE ORAL DAILY
COMMUNITY
End: 2019-06-12

## 2018-10-03 RX ORDER — GLIPIZIDE 10 MG/1
10 TABLET ORAL 2 TIMES DAILY
Qty: 180 TAB | Refills: 3 | Status: SHIPPED | OUTPATIENT
Start: 2018-10-03 | End: 2019-02-07 | Stop reason: SDUPTHER

## 2018-10-03 NOTE — PROGRESS NOTES
Taqueria Magana is a 58 y.o. female here for   Chief Complaint   Patient presents with    Diabetes       Functional glucose monitor and record keeping system? - yes  Eye exam within last year? - on file  Foot exam within last year? - on file    1. Have you been to the ER, urgent care clinic since your last visit? Hospitalized since your last visit? - Alisa 1-2 months go for Vertigo    2. Have you seen or consulted any other health care providers outside of the [address] [place] [address] since your last visit?   Include any pap smears or colon screening.-no

## 2018-10-03 NOTE — TELEPHONE ENCOUNTER
Dr. Patricia Phillips  Received: Today       Jesus LERMA [facility] [address] [facility]                     Pt [phone] needs a refill on tramadol called into [facility] [phone].  Pt states that she has been waiting for over a week to get a refill.

## 2018-10-03 NOTE — MR AVS SNAPSHOT
[address] [facility] [address] 
[phone] Patient: Rubi Lema MRN: [id number] OJI:[id number] Visit Information Date & Time Provider Department Dept. Phone Encounter #  
 10/3/2018  9:45 AM Minoo Reina MD [facility] [phone] [id number] Follow-up Instructions Return in about 4 months (around 2/3/2019). Upcoming Health Maintenance Date Due Shingrix Vaccine Age 50> (1 of 2) 10/30/2005 FOBT Q 1 YEAR AGE 50-75 10/30/2005 Influenza Age 5 to Adult 8/1/2018 MEDICARE YEARLY EXAM 10/11/2018 HEMOGLOBIN A1C Q6M 11/18/2018 FOOT EXAM Q1 1/27/2019 EYE EXAM RETINAL OR DILATED Q1 4/30/2019 LIPID PANEL Q1 5/18/2019 MICROALBUMIN Q1 5/25/2019 BREAST CANCER SCRN MAMMOGRAM 2/9/2020 PAP AKA CERVICAL CYTOLOGY 2/2/2021 DTaP/Tdap/Td series (2 - Td) 2/2/2028 Allergies as of 10/3/2018  Review Complete On: 10/3/2018 By: Minoo Reina MD  
  
 Severity Noted Reaction Type Reactions Aspirin  06/28/2010    Hives Tolerates ibuprofen and naproxen Clindamycin  05/16/2011    Rash Hydrocodone  12/31/2013    Nausea and Vomiting Patient stated she is not allergic at all. Current Immunizations  Reviewed on 12/13/2016 Name Date Pneumococcal Polysaccharide (PPSV-23) 12/13/2016 Tdap 2/2/2018 Not reviewed this visit You Were Diagnosed With   
  
 Codes Comments Type 2 diabetes mellitus with diabetic nephropathy, without long-term current use of insulin (HCC)    -  Primary ICD-10-CM: E11.21 
ICD-9-CM: 250.40, 583.81 Essential hypertension     ICD-10-CM: I10 
ICD-9-CM: 401.9 Hypercholesterolemia     ICD-10-CM: E78.00 ICD-9-CM: 272.0 Vitals BP Pulse Temp Resp Height(growth percentile) Weight(growth percentile)  137/82 (BP 1 Location: Left arm, BP Patient Position: Sitting) (!) 110 98.9 °F (37.2 °C) (Oral) 16 5' 3\" (1.6 m) 183 lb 4.8 oz (83.1 kg) LMP SpO2 BMI OB Status Smoking Status 01/01/2008 (LMP Unknown) 98% 32.47 kg/m2 Postmenopausal Never Smoker Vitals History BMI and BSA Data Body Mass Index Body Surface Area  
 32.47 kg/m 2 1.92 m 2 Preferred Pharmacy Pharmacy Name Phone [address] [phone] Your Updated Medication List  
  
   
This list is accurate as of 10/3/18 10:51 AM.  Always use your most recent med list. amLODIPine-benazepril 10-20 mg per capsule Commonly known as:  Rosaline Headings Take 1 Cap by mouth daily. dulaglutide 1.5 mg/0.5 mL sub-q pen Commonly known as:  TRULICITY  
0.5 mL by SubCUTAneous route every seven (7) days. FISH OIL 1,000 mg Cap Generic drug:  omega-3 fatty acids-vitamin e Take 1 Cap by mouth daily. gabapentin 300 mg capsule Commonly known as:  NEURONTIN  
TAKE 1 CAPSULE BY MOUTH EVERY NIGHT  
  
 glipiZIDE 10 mg tablet Commonly known as:  Synetta Saunemin Take 1 Tab by mouth two (2) times a day. glucose blood VI test strips strip Commonly known as:  [address] Check BS once weekly  
  
 hydroCHLOROthiazide 25 mg tablet Commonly known as:  HYDRODIURIL  
TAKE 1 TABLET BY MOUTH DAILY  
  
 ibuprofen 200 mg tablet Commonly known as:  MOTRIN Take 400 mg by mouth every six (6) hours as needed for Pain.  
  
 metFORMIN 1,000 mg tablet Commonly known as:  GLUCOPHAGE Take 1 Tab by mouth two (2) times daily (with meals). methocarbamol 750 mg tablet Commonly known as:  ROBAXIN Take 750 mg by mouth as needed. multivitamin tablet Commonly known as:  ONE A DAY Take 1 Tab by mouth daily. With B12  
  
 ondansetron hcl 4 mg tablet Commonly known as:  Essie Fernando Take 1 Tab by mouth every six (6) hours as needed for Nausea. rosuvastatin 10 mg tablet Commonly known as:  CRESTOR  
TAKE 1 TABLET BY MOUTH DAILY We Performed the Following AMB POC GLUCOSE, QUANTITATIVE, BLOOD [81223 CPT(R)] AMB POC HEMOGLOBIN A1C [41905 CPT(R)] Follow-up Instructions Return in about 4 months (around 2/3/2019). Patient Instructions Glipizide 1/2 a tablet twice a day , if you have low sugars are less than 80 Introducing [facility]! Dear Man Singh: Thank you for requesting a [employer] account. Our records indicate that you already have an active [employer] account. You can access your account anytime at [email or web address] Did you know that you can access your hospital and ER discharge instructions at any time in [employer]? You can also review all of your test results from your hospital stay or ER visit. Additional Information If you have questions, please visit the Frequently Asked Questions section of the [employer] website at [email or web address] Remember, [employer] is NOT to be used for urgent needs. For medical emergencies, dial 911. Now available from your iPhone and Android! Please provide this summary of care documentation to your next provider. Your primary care clinician is listed as [address] [place] [address]. If you have any questions after today's visit, please call [phone].

## 2018-10-03 NOTE — TELEPHONE ENCOUNTER
Patient called the office and was informed medication refused. Appointment was scheduled.     Refill refused: Appt required, please call patient

## 2018-10-03 NOTE — PROGRESS NOTES
[facility]               Chema Oquendo MD        [address] [address] DL:[phone] Fax [phone]               Patient Information  Date:10/3/2018  Name : Radha Campa 58 y.o.     YOB: 1955         Referred by: Rochelle Brand MD         Chief Complaint   Patient presents with    Diabetes       History of Present Illness: Radha Campa is a 58 y.o. female here for follow-up of  Type 2 Diabetes Mellitus. Type 2 Diabetes was diagnosed 10 years . [facility] End organ effects of diabetes: nephropathy, neuropathy . Cardiovascular risk factors: diabetes mellitus, post-menopausal   She is on Trulicity. She could not get metformin for a week from the pharmacy, had refills  Did not bring the meter, I have no data  Trulicity is helping her appetite,  No weight gain  Planning to resume exercise    History of narcolepsy      Wt Readings from Last 3 Encounters:   10/03/18 183 lb 4.8 oz (83.1 kg)   08/30/18 188 lb (85.3 kg)   05/25/18 186 lb 14.4 oz (84.8 kg)       BP Readings from Last 3 Encounters:   10/03/18 137/82   08/30/18 122/81   05/25/18 121/71           Past Medical History:   Diagnosis Date    Diabetes ([facility] [facility] 1995    Genital herpes simplex type 2     GERD (gastroesophageal reflux disease)     no medication prescribed    Hypercholesterolemia     Hypertension     Narcolepsy     Neuropathy in diabetes ([facility]      Current Outpatient Prescriptions   Medication Sig    multivitamin (ONE A DAY) tablet Take 1 Tab by mouth daily. With B12    gabapentin (NEURONTIN) 300 mg capsule TAKE 1 CAPSULE BY MOUTH EVERY NIGHT    rosuvastatin (CRESTOR) 10 mg tablet TAKE 1 TABLET BY MOUTH DAILY    ondansetron hcl (ZOFRAN) 4 mg tablet Take 1 Tab by mouth every six (6) hours as needed for Nausea.  dulaglutide (TRULICITY) 1.5 Lithuanian/5.8 mL sub-q pen 0.5 mL by SubCUTAneous route every seven (7) days.     metFORMIN (GLUCOPHAGE) 1,000 mg tablet Take 1 Tab by mouth two (2) times daily (with meals). (Patient taking differently: Take 1,000 mg by mouth three (3) times daily.)    glipiZIDE (GLUCOTROL) 10 mg tablet Take 1 Tab by mouth two (2) times a day.  amLODIPine-benazepril (LOTREL) 10-20 mg per capsule Take 1 Cap by mouth daily.  hydroCHLOROthiazide (HYDRODIURIL) 25 mg tablet TAKE 1 TABLET BY MOUTH DAILY    glucose blood VI test strips (ACCU-CHEK SMARTVIEW TEST STRIP) strip Check BS once weekly    omega-3 fatty acids-vitamin e (FISH OIL) 1,000 mg Cap Take 1 Cap by mouth daily.  methocarbamol (ROBAXIN) 750 mg tablet Take 750 mg by mouth as needed.  ibuprofen (MOTRIN) 200 mg tablet Take 400 mg by mouth every six (6) hours as needed for Pain. No current facility-administered medications for this visit. Allergies   Allergen Reactions    Aspirin Hives     Tolerates ibuprofen and naproxen    Clindamycin Rash    Hydrocodone Nausea and Vomiting     Patient stated she is not allergic at all. Review of Systems:  All 10 systems reviewed and are negative other than mentioned in HPI    Physical Examination:   Blood pressure 137/82, pulse (!) 110, temperature 98.9 °F (37.2 °C), temperature source Oral, resp. rate 16, height 5' 3\" (1.6 m), weight 183 lb 4.8 oz (83.1 kg), last menstrual period 01/01/2008, SpO2 98 %. Estimated body mass index is 32.47 kg/(m^2) as calculated from the following:    Height as of this encounter: 5' 3\" (1.6 m). -   Weight as of this encounter: 183 lb 4.8 oz (83.1 kg).   - General: pleasant, no distress, good eye contact  - HEENT: no pallor, no periorbital edema, EOMI  - Neck: supple,   - CVS: S1, S2 heard  - RS: Clear  - Musculoskeletal: no proximal muscle weakness in upper or lower extremities  - Integumentary: ,no edema,   - Neurological: ,alert and oriented  - Psychiatric: normal mood and affect  - Skin: color, texture, turgor normal.     Diabetic foot exam: January 2018    Left:    Vibratory sensation absent Filament test absent sensation with micro filament   Pulse DP: 1 +    Deformities: none     Right:    Vibratory sensation absent   Filament test absent sensation with micro filament   Pulse DP: 1+                     Deformities: none     Data Reviewed:     [] Glucose records reviewed. [] See glucose records for details (to be scanned). [] A1C  [] Reviewed labs    Lab Results   Component Value Date/Time    Hemoglobin A1c 7.0 (H) 05/18/2018 09:25 AM    Hemoglobin A1c 7.7 (H) 02/02/2018 11:44 AM    Hemoglobin A1c 7.6 (H) 11/29/2017 12:00 AM    Glucose 104 (H) 05/18/2018 09:25 AM    Glucose (POC) 120 (H) 10/14/2015 09:51 AM    Glucose  10/03/2018 10:09 AM    Microalbumin/Creat ratio (mg/g creat) 11 06/03/2010 12:37 PM    Microalb/Creat ratio (ug/mg creat.) 19.5 05/25/2018 11:05 AM    Microalbumin,urine random 1.99 06/03/2010 12:37 PM    LDL,Direct 74 11/29/2017 12:00 AM    LDL, calculated 60 05/18/2018 09:25 AM    Creatinine (POC) 0.8 03/17/2014 03:37 PM    Creatinine 1.09 (H) 05/18/2018 09:25 AM      Lab Results   Component Value Date/Time    Cholesterol, total 128 05/18/2018 09:25 AM    HDL Cholesterol 52 05/18/2018 09:25 AM    LDL,Direct 74 11/29/2017 12:00 AM    LDL, calculated 60 05/18/2018 09:25 AM    Triglyceride 79 05/18/2018 09:25 AM    CHOL/HDL Ratio 2.6 03/18/2010 10:05 AM     Lab Results   Component Value Date/Time    ALT (SGPT) 17 05/18/2018 09:25 AM    AST (SGOT) 15 05/18/2018 09:25 AM    Alk.  phosphatase 86 05/18/2018 09:25 AM    Bilirubin, total <0.2 05/18/2018 09:25 AM    Albumin 4.1 05/18/2018 09:25 AM    Protein, total 7.4 05/18/2018 09:25 AM    PLATELET 291 42/38/1776 11:44 AM       Lab Results   Component Value Date/Time    GFR est non-AA 55 (L) 05/18/2018 09:25 AM    GFRNA, POC >60 03/17/2014 03:37 PM    GFR est AA 63 05/18/2018 09:25 AM    GFRAA, POC >60 03/17/2014 03:37 PM    Creatinine 1.09 (H) 05/18/2018 09:25 AM    Creatinine (POC) 0.8 03/17/2014 03:37 PM    BUN 20 05/18/2018 09:25 AM    BUN (POC) 14 11/30/2013 02:22 PM    Sodium 147 (H) 05/18/2018 09:25 AM    Sodium (POC) 140 11/30/2013 02:22 PM    Potassium 4.4 05/18/2018 09:25 AM    Potassium (POC) 3.5 11/30/2013 02:22 PM    Chloride 103 05/18/2018 09:25 AM    Chloride (POC) 104 11/30/2013 02:22 PM    CO2 28 05/18/2018 09:25 AM    Magnesium 1.9 01/03/2014 03:12 AM               Assessment/Plan:   1. Type 2 diabetes mellitus with diabetic nephropathy, without long-term current use of insulin ([facility] [address]    2. Essential hypertension    3. Hypercholesterolemia        1. Type 2 Diabetes Mellitus with nephropathy,neuropathy,  Lab Results   Component Value Date/Time    Hemoglobin A1c 7.0 (H) 05/18/2018 09:25 AM    Hemoglobin A1c (POC) 7.2 10/03/2018 10:09 AM   Controlled  Metformin 1 tab in AM and 1 tab dinner . Glipizide 10 mg in AM and 10 mg before dinner. If she has low blood glucose advised to decrease glipizide to half a tablet twice daily  Trulicity weekly   Discussed the relationship of food  in relation to blood glucose control, agreed to cut down    2. HTN : Continue current therapy     3. Hyperlipidemia : Continue statin. 4.Obesity:Body mass index is 32.47 kg/(m^2). Discussed about the importance of exercise and carbohydrate portion control. 5.  Narcolepsy: Follow-up with neurology    There are no Patient Instructions on file for this visit. Follow-up Disposition: Not on File    Thank you for allowing me to participate in the care of this patient.     Lee Patel MD      Patient verbalized understanding

## 2018-10-04 ENCOUNTER — OFFICE VISIT (OUTPATIENT)
Dept: FAMILY MEDICINE CLINIC | Age: 63
End: 2018-10-04

## 2018-10-04 VITALS
HEART RATE: 108 BPM | HEIGHT: 63 IN | WEIGHT: 183 LBS | BODY MASS INDEX: 32.43 KG/M2 | TEMPERATURE: 99.2 F | SYSTOLIC BLOOD PRESSURE: 137 MMHG | OXYGEN SATURATION: 98 % | DIASTOLIC BLOOD PRESSURE: 90 MMHG | RESPIRATION RATE: 16 BRPM

## 2018-10-04 DIAGNOSIS — Z28.21 INFLUENZA VACCINATION DECLINED BY PATIENT: ICD-10-CM

## 2018-10-04 DIAGNOSIS — I10 ESSENTIAL HYPERTENSION WITH GOAL BLOOD PRESSURE LESS THAN 130/80: ICD-10-CM

## 2018-10-04 DIAGNOSIS — E11.42 DIABETIC POLYNEUROPATHY ASSOCIATED WITH TYPE 2 DIABETES MELLITUS (HCC): Primary | ICD-10-CM

## 2018-10-04 DIAGNOSIS — E11.65 TYPE 2 DIABETES MELLITUS WITH HYPERGLYCEMIA, WITHOUT LONG-TERM CURRENT USE OF INSULIN (HCC): ICD-10-CM

## 2018-10-04 RX ORDER — METFORMIN HYDROCHLORIDE 1000 MG/1
1000 TABLET ORAL 2 TIMES DAILY WITH MEALS
Qty: 180 TAB | Refills: 3 | Status: SHIPPED | OUTPATIENT
Start: 2018-10-04 | End: 2019-11-06 | Stop reason: SDUPTHER

## 2018-10-04 RX ORDER — AMLODIPINE AND BENAZEPRIL HYDROCHLORIDE 10; 20 MG/1; MG/1
1 CAPSULE ORAL DAILY
Qty: 90 CAP | Refills: 3 | Status: SHIPPED | OUTPATIENT
Start: 2018-10-04 | End: 2019-07-31 | Stop reason: SDUPTHER

## 2018-10-04 RX ORDER — HYDROCHLOROTHIAZIDE 25 MG/1
TABLET ORAL
Qty: 90 TAB | Refills: 3 | Status: SHIPPED | OUTPATIENT
Start: 2018-10-04 | End: 2019-07-31 | Stop reason: SDUPTHER

## 2018-10-04 RX ORDER — GABAPENTIN 300 MG/1
600 CAPSULE ORAL
Qty: 180 CAP | Refills: 0 | Status: SHIPPED | OUTPATIENT
Start: 2018-10-04 | End: 2019-05-14 | Stop reason: SDUPTHER

## 2018-10-04 RX ORDER — TRAMADOL HYDROCHLORIDE 50 MG/1
50 TABLET ORAL
Qty: 360 TAB | Refills: 0 | Status: SHIPPED | OUTPATIENT
Start: 2018-10-04 | End: 2018-11-08 | Stop reason: SDUPTHER

## 2018-10-04 RX ORDER — TRAMADOL HYDROCHLORIDE 50 MG/1
50 TABLET ORAL
Qty: 90 TAB | Refills: 0 | Status: SHIPPED | OUTPATIENT
Start: 2018-10-04 | End: 2018-10-04 | Stop reason: SDUPTHER

## 2018-10-04 NOTE — PROGRESS NOTES
Chief Complaint   Patient presents with    Foot Pain    Medication Refill     1. Have you been to the ER, urgent care clinic since your last visit? Hospitalized since your last visit? No    2. Have you seen or consulted any other health care providers outside of the [address] [place] [address] since your last visit? Include any pap smears or colon screening.  No

## 2018-10-04 NOTE — PROGRESS NOTES
HPI     CC: medication refill, diabetic neuropathy     Emelia Acosta is a 58 y.o. female with DM with diabetic neuropathy, HTN, HLD, obesity, who presents for follow up and medication refill. Diabetes  - working on diet and exercise. A1c down to 7.2; has lost several pounds, down to 183 lb.   - on Metformin 9729 mg BID, Trulicity weekly, Glipizide 10mg BID   - Follows with endocrinology and podiatry. - last eye exam more than 1 year ago. PPSV 23 in 2016.   - requesting Tramadol and Gabapentin refill for diabetic neuropathy. Takes Tramadol 4 tabs/ day. Is on Gabapentin 300mg QHS; states that one day she took 1200 mg because she was out of Tramadol and had symptomatic relief. HTN  - BP typically 120s/ 70s at home.   - HCTZ 25 mg daily and Amlodipine-Benazepril 10-20 mg daily   - no headache, blurry vision, chest pain, SOB, abdominal pain, nausea, vomiting. PMHx - Reviewed  Past Medical History:   Diagnosis Date    [facility] 1995    Genital herpes simplex type 2     GERD (gastroesophageal reflux disease)     no medication prescribed    Hypercholesterolemia     Hypertension     Narcolepsy     Neuropathy in diabetes ([facility]        Meds - Reviewed  Current Outpatient Prescriptions   Medication Sig Dispense Refill    multivitamin (ONE A DAY) tablet Take 1 Tab by mouth daily. With B12      metFORMIN (GLUCOPHAGE) 1,000 mg tablet Take 1 Tab by mouth two (2) times daily (with meals). 180 Tab 3    dulaglutide (TRULICITY) 1.5 SE/5.0 mL sub-q pen 0.5 mL by SubCUTAneous route every seven (7) days. 12 Syringe 3    glipiZIDE (GLUCOTROL) 10 mg tablet Take 1 Tab by mouth two (2) times a day. 180 Tab 3    gabapentin (NEURONTIN) 300 mg capsule TAKE 1 CAPSULE BY MOUTH EVERY NIGHT 90 Cap 0    rosuvastatin (CRESTOR) 10 mg tablet TAKE 1 TABLET BY MOUTH DAILY 90 Tab 0    amLODIPine-benazepril (LOTREL) 10-20 mg per capsule Take 1 Cap by mouth daily.  90 Cap 3    hydroCHLOROthiazide (HYDRODIURIL) 25 mg tablet TAKE 1 TABLET BY MOUTH DAILY 90 Tab 3    glucose blood VI test strips (ACCU-CHEK SMARTVIEW TEST STRIP) strip Check BS once weekly 90 Strip 0    omega-3 fatty acids-vitamin e (FISH OIL) 1,000 mg Cap Take 1 Cap by mouth daily.  ondansetron hcl (ZOFRAN) 4 mg tablet Take 1 Tab by mouth every six (6) hours as needed for Nausea. 30 Tab 0    methocarbamol (ROBAXIN) 750 mg tablet Take 750 mg by mouth as needed. 0    ibuprofen (MOTRIN) 200 mg tablet Take 400 mg by mouth every six (6) hours as needed for Pain. Allergies - Reviewed  Allergies   Allergen Reactions    Aspirin Hives     Tolerates ibuprofen and naproxen    Clindamycin Rash    Hydrocodone Nausea and Vomiting     Patient stated she is not allergic at all. Smoker - Reviewed  History   Smoking Status    Never Smoker   Smokeless Tobacco    Never Used       ETOH - Reviewed  History   Alcohol Use No     Comment: rarely       FH - Reviewed  Family History   Problem Relation Age of Onset    Diabetes Mother     Heart Disease Mother     Hypertension Mother    [facility] Arthritis-rheumatoid Mother     Diabetes Father     Heart Disease Father     Cancer Maternal Aunt      bone    Diabetes Sister        ROS:  Review of Systems   Constitutional: Negative for activity change, appetite change, chills, diaphoresis, fatigue and fever. Eyes: Negative for visual disturbance. Respiratory: Negative for chest tightness and shortness of breath. Cardiovascular: Negative for chest pain, palpitations and leg swelling. Gastrointestinal: Negative for abdominal pain, nausea and vomiting. Endocrine: Negative for polydipsia and polyuria. Genitourinary: Negative for dysuria and hematuria. Skin: Negative for rash and wound. Neurological: Negative for dizziness, light-headedness and headaches. Neuropathy of bilateral toes.         Physical Exam:  Visit Vitals    /90    Pulse (!) 108    Temp 99.2 °F (37.3 °C) (Oral)    Resp 16    Ht 5' 3\" (1.6 m)    Wt 183 lb (83 kg)    LMP 01/01/2008 (LMP Unknown)    SpO2 98%    BMI 32.42 kg/m2       Wt Readings from Last 3 Encounters:   10/04/18 183 lb (83 kg)   10/03/18 183 lb 4.8 oz (83.1 kg)   08/30/18 188 lb (85.3 kg)     BP Readings from Last 3 Encounters:   10/04/18 137/90   10/03/18 137/82   08/30/18 122/81        Physical Exam   Constitutional: She appears well-developed and well-nourished. No distress. HENT:   Head: Normocephalic and atraumatic. Mouth/Throat: Oropharynx is clear and moist.   Eyes: No scleral icterus. Cardiovascular: Normal rate and regular rhythm. Pulmonary/Chest: Effort normal and breath sounds normal. No respiratory distress. She has no wheezes. Musculoskeletal: She exhibits no edema or tenderness. Neurological:   AOx3. Normal coordination and tone. No sensation in all toes on monofilament exam.    Skin: She is not diaphoretic. Warm, dry. No rashes, wounds, or blisters on either feet. Psychiatric: She has a normal mood and affect. Her behavior is normal.   Nursing note and vitals reviewed. Assessment     58 y.o. female with Type 2 diabetes with polyneuropathy and nephropathy, HTN, HLD presents for medication refill.    Patient Active Problem List   Diagnosis Code    Hypertension I10    Diabetes ([facility] [address] E11.9    Hypercholesterolemia E78.00    Neuropathy due to secondary diabetes ([address] E13.40    Lumbar stenosis M48.061    Lumbar herniated disc M51.26    Postcoital UTI N39.0    Type 2 diabetes mellitus with diabetic nephropathy, without long-term current use of insulin ([facility]) E11.21    Type 2 diabetes mellitus with hyperglycemia, without long-term current use of insulin ([facility]) E11.65    Closed nondisplaced fracture of first cervical vertebra ([facility]) S12.001A    Closed fracture of multiple ribs of right side with routine healing S22.41XD    Type 2 diabetes mellitus with diabetic neuropathy ([facility] E11.40       Today's diagnoses are:    ICD-10-CM ICD-9-CM    1. Diabetic polyneuropathy associated with type 2 diabetes mellitus (HCC) E11.42 250.60 gabapentin (NEURONTIN) 300 mg capsule     357.2 traMADol (ULTRAM) 50 mg tablet      DISCONTINUED: traMADol (ULTRAM) 50 mg tablet   2. Type 2 diabetes mellitus with hyperglycemia, without long-term current use of insulin (HCC) E11.65 250.00 metFORMIN (GLUCOPHAGE) 1,000 mg tablet     790.29    3. Essential hypertension with goal blood pressure less than 130/80 I10 401.9 amLODIPine-benazepril (LOTREL) 10-20 mg per capsule      hydroCHLOROthiazide (HYDRODIURIL) 25 mg tablet   4. BMI 32.0-32.9,adult Z68.32 V85.32    5. Influenza vaccination declined by patient Z28.21 V64.06               Plan     1. Diabetes - with neuropathy and nephropathy.   - A1c 7.2. Last eye exam > 1 year ago. Last urine microalbumin in 5/2018. PPSV 23 in 2016.   - diabetic foot exam today  - increase to Gabapentin 600 mg QHS  - refill Tramadol 50 mg Q6H PRN.  appropriate. - refill Metformin 1000mg BID   - continue Trulicity 0.5 ml weekly and Glipizide 10 mg BID   - continue with diet and exercise   - continue to follow with endocrinology     2. HTN - 137/90 today, at goal.   - refill HCTZ 25 mg daily   - refill Amlodipine-Benazepril 10-20 mg daily     3. Body mass index is 32.42 kg/(m^2). - continue efforts with diet and exercise     Follow up in 3 months     Prior labs and imaging were reviewed. I have discussed the diagnosis with the patient and the intended plan as seen in the above orders. The patient has received an after-visit summary and questions were answered concerning future plans. I have discussed medication side effects and warnings with the patient as well. Patient discussed with Dr. Alexandro Escobar, Attending Physician.     Ame Ruelas MD, PGY3  Family Medicine Resident

## 2018-10-04 NOTE — PATIENT INSTRUCTIONS
Neuropathic Pain: Care Instructions  Your Care Instructions    Neuropathic pain is caused by pressure on or damage to your nerves. It's often simply called nerve pain. Some people feel this type of pain all the time. For others, it comes and goes. Diabetes, shingles, or an injury can cause nerve pain. Many people say the pain feels sharp, burning, or stabbing. But some people feel it as a dull ache. In some cases, it makes your skin very sensitive. So touch, pressure, and other sensations that did not hurt before may now cause pain. It's important to know that this kind of pain is real and can affect your quality of life. It's also important to know that treatment can help. Treatment includes pain medicines, exercise, and physical therapy. Medicines can help reduce the number of pain signals that travel over the nerves. This can make the painful areas less sensitive. It can also help you sleep better and improve your mood. But medicines are only one part of successful treatment. Most people do best with more than one kind of treatment. Your doctor may recommend that you try cognitive-behavioral therapy and stress management. Or, if needed, you may decide to try to quit smoking, lower your blood pressure, or better control blood sugar. These kinds of healthy changes can also make a difference. If you feel that your treatment is not working, talk to your doctor. And be sure to tell your doctor if you think you might be depressed or anxious. These are common problems that can also be treated. Follow-up care is a key part of your treatment and safety. Be sure to make and go to all appointments, and call your doctor if you are having problems. It's also a good idea to know your test results and keep a list of the medicines you take. How can you care for yourself at home? · Be safe with medicines. Read and follow all instructions on the label.   ¨ If the doctor gave you a prescription medicine for pain, take it as prescribed. ¨ If you are not taking a prescription pain medicine, ask your doctor if you can take an over-the-counter medicine. · Save hard tasks for days when you have less pain. Follow a hard task with an easy task. And remember to take breaks. · Relax, and reduce stress. You may want to try deep breathing or meditation. These can help. · Keep moving. Gentle, daily exercise can help reduce pain. Your doctor or physical therapist can tell you what type of exercise is best for you. This may include walking, swimming, and stationary biking. It may also include stretches and range-of-motion exercises. · Try heat, cold packs, and massage. · Get enough sleep. Constant pain can make you more tired. If the pain makes it hard to sleep, talk with your doctor. · Think positively. Your thoughts can affect your pain. Do fun things to distract yourself from the pain. See a movie, read a book, listen to music, or spend time with a friend. · Keep a pain diary. Try to write down how strong your pain is and what it feels like. Also try to notice and write down how your moods, thoughts, sleep, activities, and medicine affect your pain. These notes can help you and your doctor find the best ways to treat your pain. Reducing constipation caused by pain medicine  Pain medicines often cause constipation. To reduce constipation:  · Include fruits, vegetables, beans, and whole grains in your diet each day. These foods are high in fiber. · Drink plenty of fluids, enough so that your urine is light yellow or clear like water. If you have kidney, heart, or liver disease and have to limit fluids, talk with your doctor before you increase the amount of fluids you drink. · Get some exercise every day. Build up slowly to 30 to 60 minutes a day on 5 or more days of the week. · Take a fiber supplement, such as Citrucel or Metamucil, every day if needed. Read and follow all instructions on the label.   · Schedule time each day for a bowel movement. Having a daily routine may help. Take your time and do not strain when having a bowel movement. · Ask your doctor about a laxative. The goal is to have one easy bowel movement every 1 to 2 days. Do not let constipation go untreated for more than 3 days. When should you call for help? Call your doctor now or seek immediate medical care if:    · You feel sad, anxious, or hopeless for more than a few days. This could mean you are depressed. Depression is common in people who have a lot of pain. But it can be treated.     · You have trouble with bowel movements, such as:  ¨ No bowel movement in 3 days. ¨ Blood in the anal area, in your stool, or on the toilet paper. ¨ Diarrhea for more than 24 hours.    Watch closely for changes in your health, and be sure to contact your doctor if:    · Your pain is getting worse.     · You can't sleep because of pain.     · You are very worried or anxious about your pain.     · You have trouble taking your pain medicine.     · You have any concerns about your pain medicine or its side effects.     · You have vomiting or cramps for more than 2 hours. Where can you learn more? Go to http://yesenia-anshu.info/. Enter P387 in the search box to learn more about \"Neuropathic Pain: Care Instructions. \"  Current as of: June 4, 2018  Content Version: 11.8  © 3755-2811 [employer]. Care instructions adapted under license by [employer] (which disclaims liability or warranty for this information). If you have questions about a medical condition or this instruction, always ask your healthcare professional. [place] [address] any warranty or liability for your use of this information. Diabetes Foot Health: Care Instructions  Your Care Instructions    When you have diabetes, your feet need extra care and attention.  Diabetes can damage the nerve endings and blood vessels in your feet, making you less likely to notice when your feet are injured. Diabetes also limits your body's ability to fight infection and get blood to areas that need it. If you get a minor foot injury, it could become an ulcer or a serious infection. With good foot care, you can prevent most of these problems. Caring for your feet can be quick and easy. Most of the care can be done when you are bathing or getting ready for bed. Follow-up care is a key part of your treatment and safety. Be sure to make and go to all appointments, and call your doctor if you are having problems. It's also a good idea to know your test results and keep a list of the medicines you take. How can you care for yourself at home? · Keep your blood sugar close to normal by watching what and how much you eat, monitoring blood sugar, taking medicines if prescribed, and getting regular exercise. · Do not smoke. Smoking affects blood flow and can make foot problems worse. If you need help quitting, talk to your doctor about stop-smoking programs and medicines. These can increase your chances of quitting for good. · Eat a diet that is low in fats. High fat intake can cause fat to build up in your blood vessels and decrease blood flow. · Inspect your feet daily for blisters, cuts, cracks, or sores. If you cannot see well, use a mirror or have someone help you. · Take care of your feet:  [facility] your feet every day. Use warm (not hot) water. Check the water temperature with your wrists or other part of your body, not your feet. ¨ Dry your feet well. Pat them dry. Do not rub the skin on your feet too hard. Dry well between your toes. If the skin on your feet stays moist, bacteria or a fungus can grow, which can lead to infection. ¨ Keep your skin soft. Use moisturizing skin cream to keep the skin on your feet soft and prevent calluses and cracks. But do not put the cream between your toes, and stop using any cream that causes a rash. ¨ Clean underneath your toenails carefully. Do not use a sharp object to clean underneath your toenails. Use the blunt end of a nail file or other rounded tool. ¨ Trim and file your toenails straight across to prevent ingrown toenails. Use a nail clipper, not scissors. Use an emery board to smooth the edges. · Change socks daily. Socks without seams are best, because seams often rub the feet. You can find socks for people with diabetes from specialty catalogs. · Look inside your shoes every day for things like gravel or torn linings, which could cause blisters or sores. · Buy shoes that fit well:  ¨ Look for shoes that have plenty of space around the toes. This helps prevent bunions and blisters. ¨ Try on shoes while wearing the kind of socks you will usually wear with the shoes. ¨ Avoid plastic shoes. They may rub your feet and cause blisters. Good shoes should be made of materials that are flexible and breathable, such as leather or cloth. ¨ Break in new shoes slowly by wearing them for no more than an hour a day for several days. Take extra time to check your feet for red areas, blisters, or other problems after you wear new shoes. · Do not go barefoot. Do not wear sandals, and do not wear shoes with very thin soles. Thin soles are easy to puncture. They also do not protect your feet from hot pavement or cold weather. · Have your doctor check your feet during each visit. If you have a foot problem, see your doctor. Do not try to treat an early foot problem at home. Home remedies or treatments that you can buy without a prescription (such as corn removers) can be harmful. · Always get early treatment for foot problems. A minor irritation can lead to a major problem if not properly cared for early. When should you call for help?   Call your doctor now or seek immediate medical care if:    · You have a foot sore, an ulcer or break in the skin that is not healing after 4 days, bleeding corns or calluses, or an ingrown toenail.     · You have blue or black areas, which can mean bruising or blood flow problems.     · You have peeling skin or tiny blisters between your toes or cracking or oozing of the skin.     · You have a fever for more than 24 hours and a foot sore.     · You have new numbness or tingling in your feet that does not go away after you move your feet or change positions.     · You have unexplained or unusual swelling of the foot or ankle.    Watch closely for changes in your health, and be sure to contact your doctor if:    · You cannot do proper foot care. Where can you learn more? Go to http://yesenia-anshu.info/. Enter A739 in the search box to learn more about \"Diabetes Foot Health: Care Instructions. \"  Current as of: December 7, 2017  Content Version: 11.8  © 1682-7794 [employer], [employer]. Care instructions adapted under license by [employer] (which disclaims liability or warranty for this information). If you have questions about a medical condition or this instruction, always ask your healthcare professional. [address] any warranty or liability for your use of this information.

## 2018-10-04 NOTE — MR AVS SNAPSHOT
[address] 
[phone] Patient: Allison Chin MRN: [id number] RAP:[id number]/[id number] Visit Information Date & Time Provider Department Dept. Phone Encounter #  
 10/4/2018  8:00 AM Cloyd Moritz, 1515 [facility] [phone] [id number] Your Appointments 1/31/2019  8:45 AM  
LAB with CDE NURSE [facility] Appt Note: lab  
 [address] [place] [address] [address] [facility] [address]  
[phone]  
  
   
 [address] [facility] [address]  
  
    
 2/7/2019 10:30 AM  
ROUTINE CARE with Samuel Langston MD  
[facility] Appt Note: 4 mon fu DM  
 [address] [facility] [address]  
[phone]  
  
   
 [address] [facility] [address] Upcoming Health Maintenance Date Due Shingrix Vaccine Age 50> (1 of 2) 10/30/2005 FOBT Q 1 YEAR AGE 50-75 10/30/2005 Influenza Age 5 to Adult 8/1/2018 MEDICARE YEARLY EXAM 10/11/2018 FOOT EXAM Q1 1/27/2019 HEMOGLOBIN A1C Q6M 4/3/2019 EYE EXAM RETINAL OR DILATED Q1 4/30/2019 LIPID PANEL Q1 5/18/2019 MICROALBUMIN Q1 5/25/2019 BREAST CANCER SCRN MAMMOGRAM 2/9/2020 PAP AKA CERVICAL CYTOLOGY 2/2/2021 DTaP/Tdap/Td series (2 - Td) 2/2/2028 Allergies as of 10/4/2018  Review Complete On: 10/4/2018 By: Mike Khalil LPN Severity Noted Reaction Type Reactions Aspirin  06/28/2010    Hives Tolerates ibuprofen and naproxen Clindamycin  05/16/2011    Rash Hydrocodone  12/31/2013    Nausea and Vomiting Patient stated she is not allergic at all. Current Immunizations  Reviewed on 12/13/2016 Name Date Pneumococcal Polysaccharide (PPSV-23) 12/13/2016 Tdap 2/2/2018 Not reviewed this visit You Were Diagnosed With   
  
 Codes Comments Diabetic polyneuropathy associated with type 2 diabetes mellitus ([facility]    -  Primary ICD-10-CM: E11.42 
ICD-9-CM: 250.60, 357.2 Vitals BP Pulse Temp Resp Height(growth percentile) Weight(growth percentile) 137/90 (!) 108 99.2 °F (37.3 °C) (Oral) 16 5' 3\" (1.6 m) 183 lb (83 kg) LMP SpO2 BMI OB Status Smoking Status 01/01/2008 (LMP Unknown) 98% 32.42 kg/m2 Postmenopausal Never Smoker Vitals History BMI and BSA Data Body Mass Index Body Surface Area  
 32.42 kg/m 2 1.92 m 2 Preferred Pharmacy Pharmacy Name Phone [address] [phone] Your Updated Medication List  
  
   
This list is accurate as of 10/4/18  9:01 AM.  Always use your most recent med list. amLODIPine-benazepril 10-20 mg per capsule Commonly known as:  Tamea Idol Take 1 Cap by mouth daily. dulaglutide 1.5 mg/0.5 mL sub-q pen Commonly known as:  TRULICITY  
0.5 mL by SubCUTAneous route every seven (7) days. FISH OIL 1,000 mg Cap Generic drug:  omega-3 fatty acids-vitamin e Take 1 Cap by mouth daily. gabapentin 300 mg capsule Commonly known as:  NEURONTIN Take 2 Caps by mouth nightly. glipiZIDE 10 mg tablet Commonly known as:  Donelda Kang Take 1 Tab by mouth two (2) times a day. glucose blood VI test strips strip Commonly known as:  [address] Check BS once weekly  
  
 hydroCHLOROthiazide 25 mg tablet Commonly known as:  HYDRODIURIL  
TAKE 1 TABLET BY MOUTH DAILY  
  
 ibuprofen 200 mg tablet Commonly known as:  MOTRIN Take 400 mg by mouth every six (6) hours as needed for Pain.  
  
 metFORMIN 1,000 mg tablet Commonly known as:  GLUCOPHAGE Take 1 Tab by mouth two (2) times daily (with meals). methocarbamol 750 mg tablet Commonly known as:  ROBAXIN Take 750 mg by mouth as needed. multivitamin tablet Commonly known as:  ONE A DAY Take 1 Tab by mouth daily. With B12  
  
 ondansetron hcl 4 mg tablet Commonly known as:  Coke Morning Take 1 Tab by mouth every six (6) hours as needed for Nausea. rosuvastatin 10 mg tablet Commonly known as:  CRESTOR  
TAKE 1 TABLET BY MOUTH DAILY  
  
 traMADol 50 mg tablet Commonly known as:  ULTRAM  
Take 1 Tab by mouth every six (6) hours as needed for Pain. Max Daily Amount: 200 mg. Prescriptions Printed Refills  
 traMADol (ULTRAM) 50 mg tablet 0 Sig: Take 1 Tab by mouth every six (6) hours as needed for Pain. Max Daily Amount: 200 mg. Class: Print Route: Oral  
  
Prescriptions Sent to Pharmacy Refills  
 gabapentin (NEURONTIN) 300 mg capsule 0 Sig: Take 2 Caps by mouth nightly. Class: Normal  
 Pharmacy: [facility], [address] [facility] TITA Campbell Ph #: [phone] Route: Oral  
  
Patient Instructions Neuropathic Pain: Care Instructions Your Care Instructions Neuropathic pain is caused by pressure on or damage to your nerves. It's often simply called nerve pain. Some people feel this type of pain all the time. For others, it comes and goes. Diabetes, shingles, or an injury can cause nerve pain. Many people say the pain feels sharp, burning, or stabbing. But some people feel it as a dull ache. In some cases, it makes your skin very sensitive. So touch, pressure, and other sensations that did not hurt before may now cause pain. It's important to know that this kind of pain is real and can affect your quality of life. It's also important to know that treatment can help. Treatment includes pain medicines, exercise, and physical therapy. Medicines can help reduce the number of pain signals that travel over the nerves. This can make the painful areas less sensitive.  It can also help you sleep better and improve your mood. But medicines are only one part of successful treatment. Most people do best with more than one kind of treatment. Your doctor may recommend that you try cognitive-behavioral therapy and stress management. Or, if needed, you may decide to try to quit smoking, lower your blood pressure, or better control blood sugar. These kinds of healthy changes can also make a difference. If you feel that your treatment is not working, talk to your doctor. And be sure to tell your doctor if you think you might be depressed or anxious. These are common problems that can also be treated. Follow-up care is a key part of your treatment and safety. Be sure to make and go to all appointments, and call your doctor if you are having problems. It's also a good idea to know your test results and keep a list of the medicines you take. How can you care for yourself at home? · Be safe with medicines. Read and follow all instructions on the label. ¨ If the doctor gave you a prescription medicine for pain, take it as prescribed. ¨ If you are not taking a prescription pain medicine, ask your doctor if you can take an over-the-counter medicine. · Save hard tasks for days when you have less pain. Follow a hard task with an easy task. And remember to take breaks. · Relax, and reduce stress. You may want to try deep breathing or meditation. These can help. · Keep moving. Gentle, daily exercise can help reduce pain. Your doctor or physical therapist can tell you what type of exercise is best for you. This may include walking, swimming, and stationary biking. It may also include stretches and range-of-motion exercises. · Try heat, cold packs, and massage. · Get enough sleep. Constant pain can make you more tired. If the pain makes it hard to sleep, talk with your doctor. · Think positively. Your thoughts can affect your pain.  Do fun things to distract yourself from the pain. See a movie, read a book, listen to music, or spend time with a friend. · Keep a pain diary. Try to write down how strong your pain is and what it feels like. Also try to notice and write down how your moods, thoughts, sleep, activities, and medicine affect your pain. These notes can help you and your doctor find the best ways to treat your pain. Reducing constipation caused by pain medicine Pain medicines often cause constipation. To reduce constipation: 
· Include fruits, vegetables, beans, and whole grains in your diet each day. These foods are high in fiber. · Drink plenty of fluids, enough so that your urine is light yellow or clear like water. If you have kidney, heart, or liver disease and have to limit fluids, talk with your doctor before you increase the amount of fluids you drink. · Get some exercise every day. Build up slowly to 30 to 60 minutes a day on 5 or more days of the week. · Take a fiber supplement, such as Citrucel or Metamucil, every day if needed. Read and follow all instructions on the label. · Schedule time each day for a bowel movement. Having a daily routine may help. Take your time and do not strain when having a bowel movement. · Ask your doctor about a laxative. The goal is to have one easy bowel movement every 1 to 2 days. Do not let constipation go untreated for more than 3 days. When should you call for help? Call your doctor now or seek immediate medical care if: 
  · You feel sad, anxious, or hopeless for more than a few days. This could mean you are depressed. Depression is common in people who have a lot of pain. But it can be treated.  
  · You have trouble with bowel movements, such as: 
¨ No bowel movement in 3 days. ¨ Blood in the anal area, in your stool, or on the toilet paper. ¨ Diarrhea for more than 24 hours.  
 Watch closely for changes in your health, and be sure to contact your doctor if: 
  · Your pain is getting worse.   · You can't sleep because of pain.  
  · You are very worried or anxious about your pain.  
  · You have trouble taking your pain medicine.  
  · You have any concerns about your pain medicine or its side effects.  
  · You have vomiting or cramps for more than 2 hours. Where can you learn more? Go to http://yesenia-anshu.info/. Enter O968 in the search box to learn more about \"Neuropathic Pain: Care Instructions. \" Current as of: June 4, 2018 Content Version: 11.8 © 1968-1439 [employer]. Care instructions adapted under license by [employer] (which disclaims liability or warranty for this information). If you have questions about a medical condition or this instruction, always ask your healthcare professional. [address] any warranty or liability for your use of this information. Diabetes Foot Health: Care Instructions Your Care Instructions When you have diabetes, your feet need extra care and attention. Diabetes can damage the nerve endings and blood vessels in your feet, making you less likely to notice when your feet are injured. Diabetes also limits your body's ability to fight infection and get blood to areas that need it. If you get a minor foot injury, it could become an ulcer or a serious infection. With good foot care, you can prevent most of these problems. Caring for your feet can be quick and easy. Most of the care can be done when you are bathing or getting ready for bed. Follow-up care is a key part of your treatment and safety. Be sure to make and go to all appointments, and call your doctor if you are having problems. It's also a good idea to know your test results and keep a list of the medicines you take. How can you care for yourself at home? · Keep your blood sugar close to normal by watching what and how much you eat, monitoring blood sugar, taking medicines if prescribed, and getting regular exercise. · Do not smoke. Smoking affects blood flow and can make foot problems worse. If you need help quitting, talk to your doctor about stop-smoking programs and medicines. These can increase your chances of quitting for good. · Eat a diet that is low in fats. High fat intake can cause fat to build up in your blood vessels and decrease blood flow. · Inspect your feet daily for blisters, cuts, cracks, or sores. If you cannot see well, use a mirror or have someone help you. · Take care of your feet: 
[facility] AUTHORITY your feet every day. Use warm (not hot) water. Check the water temperature with your wrists or other part of your body, not your feet. ¨ Dry your feet well. Pat them dry. Do not rub the skin on your feet too hard. Dry well between your toes. If the skin on your feet stays moist, bacteria or a fungus can grow, which can lead to infection. ¨ Keep your skin soft. Use moisturizing skin cream to keep the skin on your feet soft and prevent calluses and cracks. But do not put the cream between your toes, and stop using any cream that causes a rash. ¨ Clean underneath your toenails carefully. Do not use a sharp object to clean underneath your toenails. Use the blunt end of a nail file or other rounded tool. ¨ Trim and file your toenails straight across to prevent ingrown toenails. Use a nail clipper, not scissors. Use an emery board to smooth the edges. · Change socks daily. Socks without seams are best, because seams often rub the feet. You can find socks for people with diabetes from specialty catalogs. · Look inside your shoes every day for things like gravel or torn linings, which could cause blisters or sores. · Buy shoes that fit well: 
¨ Look for shoes that have plenty of space around the toes. This helps prevent bunions and blisters. ¨ Try on shoes while wearing the kind of socks you will usually wear with the shoes. ¨ Avoid plastic shoes. They may rub your feet and cause blisters.  Good shoes should be made of materials that are flexible and breathable, such as leather or cloth. ¨ Break in new shoes slowly by wearing them for no more than an hour a day for several days. Take extra time to check your feet for red areas, blisters, or other problems after you wear new shoes. · Do not go barefoot. Do not wear sandals, and do not wear shoes with very thin soles. Thin soles are easy to puncture. They also do not protect your feet from hot pavement or cold weather. · Have your doctor check your feet during each visit. If you have a foot problem, see your doctor. Do not try to treat an early foot problem at home. Home remedies or treatments that you can buy without a prescription (such as corn removers) can be harmful. · Always get early treatment for foot problems. A minor irritation can lead to a major problem if not properly cared for early. When should you call for help? Call your doctor now or seek immediate medical care if: 
  · You have a foot sore, an ulcer or break in the skin that is not healing after 4 days, bleeding corns or calluses, or an ingrown toenail.  
  · You have blue or black areas, which can mean bruising or blood flow problems.  
  · You have peeling skin or tiny blisters between your toes or cracking or oozing of the skin.  
  · You have a fever for more than 24 hours and a foot sore.  
  · You have new numbness or tingling in your feet that does not go away after you move your feet or change positions.  
  · You have unexplained or unusual swelling of the foot or ankle.  
 Watch closely for changes in your health, and be sure to contact your doctor if: 
  · You cannot do proper foot care. Where can you learn more? Go to http://yesenia-anshu.info/. Enter A739 in the search box to learn more about \"Diabetes Foot Health: Care Instructions. \" Current as of: December 7, 2017 Content Version: 11.8 © 4110-8987 [employer]. Care instructions adapted under license by [employer] (which disclaims liability or warranty for this information). If you have questions about a medical condition or this instruction, always ask your healthcare professional. [address] any warranty or liability for your use of this information. Introducing [facility]! Dear Hillary Craig: Thank you for requesting a Jodange account. Our records indicate that you already have an active Jodange account. You can access your account anytime at [email or web address] Did you know that you can access your hospital and ER discharge instructions at any time in Jodange? You can also review all of your test results from your hospital stay or ER visit. Additional Information If you have questions, please visit the Frequently Asked Questions section of the Jodange website at [email or web address] Remember, Jodange is NOT to be used for urgent needs. For medical emergencies, dial 911. Now available from your iPhone and Android! Please provide this summary of care documentation to your next provider. Your primary care clinician is listed as [address] [place] [address]. If you have any questions after today's visit, please call [phone].

## 2018-10-27 DIAGNOSIS — E78.00 HYPERCHOLESTEROLEMIA: ICD-10-CM

## 2018-11-02 RX ORDER — ROSUVASTATIN CALCIUM 10 MG/1
TABLET, COATED ORAL
Qty: 90 TAB | Refills: 0 | Status: SHIPPED | OUTPATIENT
Start: 2018-11-02 | End: 2019-02-02 | Stop reason: SDUPTHER

## 2018-11-08 ENCOUNTER — OFFICE VISIT (OUTPATIENT)
Dept: FAMILY MEDICINE CLINIC | Age: 63
End: 2018-11-08

## 2018-11-08 VITALS
TEMPERATURE: 98.9 F | BODY MASS INDEX: 33.31 KG/M2 | HEART RATE: 91 BPM | SYSTOLIC BLOOD PRESSURE: 125 MMHG | WEIGHT: 188 LBS | DIASTOLIC BLOOD PRESSURE: 78 MMHG | RESPIRATION RATE: 16 BRPM | OXYGEN SATURATION: 95 % | HEIGHT: 63 IN

## 2018-11-08 DIAGNOSIS — E11.42 DIABETIC POLYNEUROPATHY ASSOCIATED WITH TYPE 2 DIABETES MELLITUS (HCC): Primary | ICD-10-CM

## 2018-11-08 RX ORDER — TRAMADOL HYDROCHLORIDE 50 MG/1
50 TABLET ORAL
Qty: 120 TAB | Refills: 2 | Status: SHIPPED | OUTPATIENT
Start: 2018-11-08 | End: 2019-02-26 | Stop reason: SDUPTHER

## 2018-11-08 NOTE — PATIENT INSTRUCTIONS
[facility]  MD Susan Riggins. [address] [facility], [address] [place], [address]    Phone: [phone]    MD [facility] [address]  [address]  Phone: [phone]  Fax: [phone]    [address]  [address] [facility], [address] [facility]  Phone: [phone]  Fax: [phone]    Cynthia Freeman MD  [facility]  and [address]   [address]  Ph: [phone]  Fax: [phone]

## 2018-11-08 NOTE — PROGRESS NOTES
Identified Patient with two Patient identifiers (Name and ). Two Patient Identifiers confirmed. Reviewed record in preparation for visit and have obtained necessary documentation. Chief Complaint   Patient presents with    Medication Refill     Tramadol      Vitals:    18 1025 18 1049   BP: 153/77 125/78   BP 1 Location: Right arm Right arm   BP Patient Position: Sitting Sitting   Pulse: 91    Resp: 16    Temp: 98.9 °F (37.2 °C)    TempSrc: Oral    SpO2: 95%    Weight: 188 lb (85.3 kg)    Height: 5' 3\" (1.6 m)      1. Have you been to the ER, urgent care clinic since your last visit? Hospitalized since your last visit? No    2. Have you seen or consulted any other health care providers outside of the [address] [place] [address] since your last visit? Include any pap smears or colon screening.  No

## 2018-11-08 NOTE — PROGRESS NOTES
HPI     CC: medication refill, diabetic neuropathy     Steve Esquivel is a 61 y.o. female with DM with diabetic neuropathy, HTN, HLD, obesity, who presents for medication refill. Diabetes: A1c: 7.2 (10/3/18)  - Currently on Metformin 5893 mg BID, Trulicity weekly, Glipizide 10mg BID   - Follows with endocrinology, last visit was 10/3/18.   - due for eye exam  - requesting Tramadol refill for diabetic neuropathy. Takes Tramadol 4 tabs/ day. Is also on Gabapentin 600mg QHS    Has been on tramadol for years (>2 years), usually refilled by Dr. Edwina Severin.  reviewed and was appropriate. PMHx - Reviewed  Past Medical History:   Diagnosis Date    Diabetes ([facility] 1995    Genital herpes simplex type 2     GERD (gastroesophageal reflux disease)     no medication prescribed    Hypercholesterolemia     Hypertension     Narcolepsy     Neuropathy in diabetes ([facility]        Meds - Reviewed  Current Outpatient Medications   Medication Sig Dispense Refill    traMADol (ULTRAM) 50 mg tablet Take 1 Tab by mouth every six (6) hours as needed for Pain. Max Daily Amount: 200 mg. 120 Tab 2    rosuvastatin (CRESTOR) 10 mg tablet TAKE 1 TABLET BY MOUTH DAILY 90 Tab 0    gabapentin (NEURONTIN) 300 mg capsule Take 2 Caps by mouth nightly. 180 Cap 0    amLODIPine-benazepril (LOTREL) 10-20 mg per capsule Take 1 Cap by mouth daily. 90 Cap 3    hydroCHLOROthiazide (HYDRODIURIL) 25 mg tablet TAKE 1 TABLET BY MOUTH DAILY 90 Tab 3    metFORMIN (GLUCOPHAGE) 1,000 mg tablet Take 1 Tab by mouth two (2) times daily (with meals). 180 Tab 3    multivitamin (ONE A DAY) tablet Take 1 Tab by mouth daily. With B12      dulaglutide (TRULICITY) 1.5 PE/8.5 mL sub-q pen 0.5 mL by SubCUTAneous route every seven (7) days. 12 Syringe 3    glipiZIDE (GLUCOTROL) 10 mg tablet Take 1 Tab by mouth two (2) times a day. 180 Tab 3    methocarbamol (ROBAXIN) 750 mg tablet Take 750 mg by mouth as needed.   0    glucose blood VI test strips (ACCU-CHEK SMARTVIEW TEST STRIP) strip Check BS once weekly 90 Strip 0    omega-3 fatty acids-vitamin e (FISH OIL) 1,000 mg Cap Take 1 Cap by mouth daily.  ondansetron hcl (ZOFRAN) 4 mg tablet Take 1 Tab by mouth every six (6) hours as needed for Nausea. 30 Tab 0    ibuprofen (MOTRIN) 200 mg tablet Take 400 mg by mouth every six (6) hours as needed for Pain. Allergies - Reviewed  Allergies   Allergen Reactions    Aspirin Hives     Tolerates ibuprofen and naproxen    Clindamycin Rash    Hydrocodone Nausea and Vomiting     Patient stated she is not allergic at all. Smoker - Reviewed  Social History     Tobacco Use   Smoking Status Never Smoker   Smokeless Tobacco Never Used       ETOH - Reviewed  Social History     Substance and Sexual Activity   Alcohol Use No    Comment: rarely       FH - Reviewed  Family History   Problem Relation Age of Onset    Diabetes Mother     Heart Disease Mother     Hypertension Mother    [email or web address] Arthritis-rheumatoid Mother     Diabetes Father     Heart Disease Father     Cancer Maternal Aunt         bone    Diabetes Sister        ROS:  Review of Systems   Constitutional: Negative for activity change, appetite change and fever. Eyes: Negative for visual disturbance. Respiratory: Negative for chest tightness and shortness of breath. Cardiovascular: Negative for chest pain and leg swelling. Gastrointestinal: Negative for abdominal pain, nausea and vomiting. Endocrine: Negative for polydipsia and polyuria. Genitourinary: Negative for dysuria and hematuria. Skin: Negative for rash and wound. Neurological: Negative for dizziness, light-headedness and headaches. Neuropathy of bilateral toes.       Physical Exam:  Visit Vitals  /78 (BP 1 Location: Right arm, BP Patient Position: Sitting)   Pulse 91   Temp 98.9 °F (37.2 °C) (Oral)   Resp 16   Ht 5' 3\" (1.6 m)   Wt 188 lb (85.3 kg)   LMP 01/01/2008 (LMP Unknown)   SpO2 95%   BMI 33.30 kg/m² Wt Readings from Last 3 Encounters:   11/08/18 188 lb (85.3 kg)   10/04/18 183 lb (83 kg)   10/03/18 183 lb 4.8 oz (83.1 kg)     BP Readings from Last 3 Encounters:   11/08/18 125/78   10/04/18 137/90   10/03/18 137/82        Physical Exam   Constitutional: She appears well-developed and well-nourished. No distress. HENT:   Head: Normocephalic and atraumatic. Mouth/Throat: Oropharynx is clear and moist.   Eyes: No scleral icterus. Cardiovascular: Normal rate and regular rhythm. Pulmonary/Chest: Effort normal and breath sounds normal. No respiratory distress. She has no wheezes. Musculoskeletal: She exhibits no edema or tenderness. Neurological:   AOx3. Normal coordination and tone   Skin: She is not diaphoretic. Warm, dry. No rashes, wounds, or blisters on either feet. Psychiatric: She has a normal mood and affect. Her behavior is normal.   Nursing note and vitals reviewed. Assessment     61 y.o. female with Type 2 diabetes with polyneuropathy and nephropathy, HTN, HLD presents for medication refill. Patient Active Problem List   Diagnosis Code    Hypertension I10    Diabetes ([address] E11.9    Hypercholesterolemia E78.00    Neuropathy due to secondary diabetes ([address] E13.40    Lumbar stenosis M48.061    Lumbar herniated disc M51.26    Postcoital UTI N39.0    Type 2 diabetes mellitus with diabetic nephropathy, without long-term current use of insulin ([facility]) E11.21    Type 2 diabetes mellitus with hyperglycemia, without long-term current use of insulin ([facility]) E11.65    Closed nondisplaced fracture of first cervical vertebra ([facility]) S12.001A    Closed fracture of multiple ribs of right side with routine healing S22.41XD    Type 2 diabetes mellitus with diabetic neuropathy ([address] E11.40       Today's diagnoses are:    ICD-10-CM ICD-9-CM    1.  Diabetic polyneuropathy associated with type 2 diabetes mellitus ([facility]) E11.42 250.60 traMADol (ULTRAM) 50 mg tablet     357.2 Plan     1. Diabetes - with neuropathy  - refilled Tramadol 50 mg Q6H PRN.  appropriate, f/u in 3 months for pain contract with Dr. Pacheco Llamas. - UDS pos for Tramadol only (8/30/18)  - continue Trulicity 0.5 ml weekly and Glipizide 10 mg BID   - continue with diet and exercise   - continue to follow with endocrinology     Follow up in 3 months     Prior labs and imaging were reviewed. I have discussed the diagnosis with the patient and the intended plan as seen in the above orders. The patient has received an after-visit summary and questions were answered concerning future plans. I have discussed medication side effects and warnings with the patient as well. Patient discussed with Dr. Toby Ly, Attending Physician.     Bridger Stout MD, PGY2  Family Medicine Resident

## 2018-11-29 ENCOUNTER — OFFICE VISIT (OUTPATIENT)
Dept: FAMILY MEDICINE CLINIC | Age: 63
End: 2018-11-29

## 2018-11-29 VITALS
BODY MASS INDEX: 33.49 KG/M2 | OXYGEN SATURATION: 100 % | TEMPERATURE: 98.4 F | WEIGHT: 189 LBS | HEIGHT: 63 IN | DIASTOLIC BLOOD PRESSURE: 72 MMHG | SYSTOLIC BLOOD PRESSURE: 125 MMHG | RESPIRATION RATE: 20 BRPM | HEART RATE: 91 BPM

## 2018-11-29 DIAGNOSIS — Z12.39 BREAST CANCER SCREENING: ICD-10-CM

## 2018-11-29 DIAGNOSIS — Z12.4 CERVICAL CANCER SCREENING: ICD-10-CM

## 2018-11-29 DIAGNOSIS — B00.9 HSV-2 INFECTION: ICD-10-CM

## 2018-11-29 DIAGNOSIS — Z00.00 MEDICARE ANNUAL WELLNESS VISIT, SUBSEQUENT: Primary | ICD-10-CM

## 2018-11-29 DIAGNOSIS — E13.40 NEUROPATHY DUE TO SECONDARY DIABETES (HCC): ICD-10-CM

## 2018-11-29 RX ORDER — VALACYCLOVIR HYDROCHLORIDE 500 MG/1
500 TABLET, FILM COATED ORAL 2 TIMES DAILY
Qty: 6 TAB | Refills: 6 | Status: SHIPPED | OUTPATIENT
Start: 2018-11-29 | End: 2019-03-13 | Stop reason: SDUPTHER

## 2018-11-29 NOTE — PROGRESS NOTES
Chief Complaint   Patient presents with   [facility] Annual Wellness Visit     1. Have you been to the ER, urgent care clinic since your last visit? Hospitalized since your last visit? No    2. Have you seen or consulted any other health care providers outside of the [facility] since your last visit? Include any pap smears or colon screening.  No     Patient declined flu vaccine

## 2018-12-01 PROBLEM — Z12.4 CERVICAL CANCER SCREENING: Status: ACTIVE | Noted: 2018-12-01

## 2018-12-01 PROBLEM — Z12.39 BREAST CANCER SCREENING: Status: ACTIVE | Noted: 2018-12-01

## 2018-12-01 PROBLEM — B00.9 HSV-2 INFECTION: Status: ACTIVE | Noted: 2018-12-01

## 2018-12-01 NOTE — PROGRESS NOTES
HPI       Rocael Blum is a 61 y.o. female who presents annual Medicare Wellness visit. Has been seeing endocrinologist -- now taking trulicity once weekly; GRQF0C has markedly improved. Does complain of fatigue. Continues to have neuropathy in her feet, takines gabapentin and tramadol. Complaining of current genital HSV infection -- same that she has had previously; requesting valtrex. PMHx:  Past Medical History:   Diagnosis Date    Diabetes ([facility] 1995    Genital herpes simplex type 2     GERD (gastroesophageal reflux disease)     no medication prescribed    Hypercholesterolemia     Hypertension     Narcolepsy     Neuropathy in diabetes (HCC)        Meds:   Current Outpatient Medications   Medication Sig Dispense Refill    valACYclovir (VALTREX) 500 mg tablet Take 1 Tab by mouth two (2) times a day. 6 Tab 6    traMADol (ULTRAM) 50 mg tablet Take 1 Tab by mouth every six (6) hours as needed for Pain. Max Daily Amount: 200 mg. 120 Tab 2    rosuvastatin (CRESTOR) 10 mg tablet TAKE 1 TABLET BY MOUTH DAILY 90 Tab 0    gabapentin (NEURONTIN) 300 mg capsule Take 2 Caps by mouth nightly. 180 Cap 0    amLODIPine-benazepril (LOTREL) 10-20 mg per capsule Take 1 Cap by mouth daily. 90 Cap 3    hydroCHLOROthiazide (HYDRODIURIL) 25 mg tablet TAKE 1 TABLET BY MOUTH DAILY 90 Tab 3    metFORMIN (GLUCOPHAGE) 1,000 mg tablet Take 1 Tab by mouth two (2) times daily (with meals). 180 Tab 3    multivitamin (ONE A DAY) tablet Take 1 Tab by mouth daily. With B12      dulaglutide (TRULICITY) 1.5 CS/2.9 mL sub-q pen 0.5 mL by SubCUTAneous route every seven (7) days. 12 Syringe 3    glipiZIDE (GLUCOTROL) 10 mg tablet Take 1 Tab by mouth two (2) times a day. 180 Tab 3    glucose blood VI test strips (ACCU-CHEK SMARTVIEW TEST STRIP) strip Check BS once weekly 90 Strip 0    omega-3 fatty acids-vitamin e (FISH OIL) 1,000 mg Cap Take 1 Cap by mouth daily. Allergies:    Allergies   Allergen Reactions    Aspirin Hives     Tolerates ibuprofen and naproxen    Clindamycin Rash    Hydrocodone Nausea and Vomiting     Patient stated she is not allergic at all. Smoker:  Social History     Tobacco Use   Smoking Status Never Smoker   Smokeless Tobacco Never Used       ETOH:   Social History     Substance and Sexual Activity   Alcohol Use No    Comment: rarely       FH:   Family History   Problem Relation Age of Onset    Diabetes Mother     Heart Disease Mother     Hypertension Mother     Arthritis-rheumatoid Mother     Diabetes Father     Heart Disease Father     Cancer Maternal Aunt         bone    Diabetes Sister        Medicare Wellness Screening Questions  General Health Questions   During the past 4 weeks:  - How would you rate your health in general? Fair  - How often have you been bothered by feeling dizzy when standing up? occasionally  - How much have you been bothered by bodily pain? moderately  - Have you noticed any hearing difficulties? no  - Has your physical and emotional health limited your social activities with family or friends? yes     Emotional Health Questions   - Do you have a history of depression, anxiety, or emotional problems? no  - Over the past 2 weeks, have you felt down, depressed or hopeless? no  - Over the past 2 weeks, have you felt little interest or pleasure in doing things? no     Health Habits   - Please describe your diet habits: \"moderately\"  - Do you get 5 servings of fruits or vegetables daily? no  - Do you exercise regularly? yes     Activities of Daily Living and Functional Status   - Do you need help with eating, walking, dressing, bathing, toileting, the phone, transportation, shopping, preparing meals, housework, laundry, medications or managing money?  no  - In the past four weeks, was someone available to help you if you needed and wanted help with anything? yes  - Are you confident are you that you can control and manage most of your health problems? yes  - Have you been given information to help you keep track of your medications? yes  - How often do you have trouble taking your medications as prescribed? never     Fall Risk and Home Safety   - Have you fallen 2 or more times in the past year? no  - Does your home have rugs in the hallway, lack grab bars in the bathroom, lack handrails on the stairs or have poor lighting? Has rugs in hallway, no grab bars, has handrails on stairs, has good lighting  - Do you have smoke detectors and check them regularly? yes  - Do you have difficulties driving a car? no  - Do you always fasten your seat belt when you are in a car? yes    ROS:  + fatigue  + weight flucuates      Physical Exam:  Visit Vitals  /72 (BP 1 Location: Right arm, BP Patient Position: Sitting)   Pulse 91   Temp 98.4 °F (36.9 °C) (Oral)   Resp 20   Ht 5' 3\" (1.6 m)   Wt 189 lb (85.7 kg)   LMP 01/01/2008 (LMP Unknown)   SpO2 100%   BMI 33.48 kg/m²       Wt Readings from Last 3 Encounters:   11/29/18 189 lb (85.7 kg)   11/08/18 188 lb (85.3 kg)   10/04/18 183 lb (83 kg)     BP Readings from Last 3 Encounters:   11/29/18 125/72   11/08/18 125/78   10/04/18 137/90      Physical Exam     General -- awake, alert, cooperative  Lungs -- clear bilaterally  CV -- regular, S1S2     Body mass index is Body mass index is 33.48 kg/m². Was the patient's timed Up & Go test unsteady or longer than 30 seconds?  no     Evaluation of Cognitive Function   Mood/affect:  happy  Orientation: Person, Place, Time and Situation  Appearance: age appropriate and casually dressed  Family member/caregiver input: N/A    Preventive Services     (Preventive care checklist to be included in patient instructions)  Discussed today Done Previously Not Needed       Needs PCV 13 ?   Pneumococcal vaccines     x   Flu vaccine        Hepatitis B vaccine (if at risk)     Not documented   Shingles vaccine    x   TDAP vaccine    x   Mammogram    x   Pap smear       Colorectal cancer screening       Low-dose CT for lung cancer screening       Bone density test       Glaucoma screening      Cholesterol test      Diabetes screening test       Diabetes self-management class       Nutritionist referral for diabetes or renal disease      Discussion of Advance Directive   Discussed with Gabriela Ocasio her ability to prepare and advance directive in the case that an injury or illness causes her to be unable to make health care decisions. Has not done, wants to complete, gave info on how to complete         Assessment     61 y.o. female with:    ICD-10-CM ICD-9-CM    1. Medicare annual wellness visit, subsequent Z00.00 V70.0    2. HSV-2 infection B00.9 054.9    3. Neuropathy due to secondary diabetes (HCC) E13.40 249.60      357.2    4. Breast cancer screening Z12.31 V76.10    5. Cervical cancer screening Z12.4 V76.2               Plan       Orders Placed This Encounter    valACYclovir (VALTREX) 500 mg tablet       I have discussed the diagnosis with the patient and the intended plan as seen in the above orders. The patient has received an after-visit summary and questions were answered concerning future plans. I have discussed medication side effects and warnings with the patient as well.     Vivi Barry MD

## 2018-12-18 ENCOUNTER — OFFICE VISIT (OUTPATIENT)
Dept: FAMILY MEDICINE CLINIC | Age: 63
End: 2018-12-18

## 2018-12-18 DIAGNOSIS — Z87.898 HISTORY OF FEVER: ICD-10-CM

## 2018-12-18 DIAGNOSIS — J02.9 SORE THROAT: Primary | ICD-10-CM

## 2018-12-18 DIAGNOSIS — R09.82 POSTNASAL DRIP: ICD-10-CM

## 2018-12-18 DIAGNOSIS — R52 GENERALIZED BODY ACHES: ICD-10-CM

## 2018-12-18 LAB
FLUAV+FLUBV AG NOSE QL IA.RAPID: NEGATIVE POS/NEG
FLUAV+FLUBV AG NOSE QL IA.RAPID: NEGATIVE POS/NEG
S PYO AG THROAT QL: NEGATIVE
VALID INTERNAL CONTROL?: YES
VALID INTERNAL CONTROL?: YES

## 2018-12-19 VITALS
TEMPERATURE: 98.1 F | HEART RATE: 79 BPM | SYSTOLIC BLOOD PRESSURE: 118 MMHG | RESPIRATION RATE: 16 BRPM | DIASTOLIC BLOOD PRESSURE: 70 MMHG | BODY MASS INDEX: 32.78 KG/M2 | HEIGHT: 63 IN | WEIGHT: 185 LBS | OXYGEN SATURATION: 98 %

## 2018-12-19 NOTE — PATIENT INSTRUCTIONS
Discharge instructions:  1. Combination cough and could medicine such as Mucinex  2. Salt water gargle. 3. Plenty of fluids. 4. Soups  5. Acetaminophen (Tylenol):  500mg 1-2 tablets every 6 hours as needed for pain and fever. 6. Throat lozenges such as Halls as needed  7. Humidifier as needed. 8. Flonase   9. chloroseptic spray    Follow Up:  Get re-examined if not improved in  5-7 days or if symptoms worsen. If you get suddenly worse, go to the nearest hospital Emergency Room         Sore Throat: Care Instructions  Your Care Instructions    Infection by bacteria or a virus causes most sore throats. Cigarette smoke, dry air, air pollution, allergies, and yelling can also cause a sore throat. Sore throats can be painful and annoying. Fortunately, most sore throats go away on their own. If you have a bacterial infection, your doctor may prescribe antibiotics. Follow-up care is a key part of your treatment and safety. Be sure to make and go to all appointments, and call your doctor if you are having problems. It's also a good idea to know your test results and keep a list of the medicines you take. How can you care for yourself at home? · If your doctor prescribed antibiotics, take them as directed. Do not stop taking them just because you feel better. You need to take the full course of antibiotics. · Gargle with warm salt water once an hour to help reduce swelling and relieve discomfort. Use 1 teaspoon of salt mixed in 1 cup of warm water. · Take an over-the-counter pain medicine, such as acetaminophen (Tylenol), ibuprofen (Advil, Motrin), or naproxen (Aleve). Read and follow all instructions on the label. · Be careful when taking over-the-counter cold or flu medicines and Tylenol at the same time. Many of these medicines have acetaminophen, which is Tylenol. Read the labels to make sure that you are not taking more than the recommended dose. Too much acetaminophen (Tylenol) can be harmful.   · Drink plenty of fluids. Fluids may help soothe an irritated throat. Hot fluids, such as tea or soup, may help decrease throat pain. · Use over-the-counter throat lozenges to soothe pain. Regular cough drops or hard candy may also help. These should not be given to young children because of the risk of choking. · Do not smoke or allow others to smoke around you. If you need help quitting, talk to your doctor about stop-smoking programs and medicines. These can increase your chances of quitting for good. · Use a vaporizer or humidifier to add moisture to your bedroom. Follow the directions for cleaning the machine. When should you call for help? Call your doctor now or seek immediate medical care if:    · You have new or worse trouble swallowing.     · Your sore throat gets much worse on one side.    Watch closely for changes in your health, and be sure to contact your doctor if you do not get better as expected. Where can you learn more? Go to http://yesenia-anshu.info/. Enter [phone] [id number] [phone] in the search box to learn more about \"Sore Throat: Care Instructions. \"  Current as of: March 28, 2018  Content Version: 11.8  © 6485-3455 [employer]. Care instructions adapted under license by [employer] (which disclaims liability or warranty for this information). If you have questions about a medical condition or this instruction, always ask your healthcare professional. [place] [address] any warranty or liability for your use of this information. Cough: Care Instructions  Your Care Instructions    A cough is your body's response to something that bothers your throat or airways. Many things can cause a cough. You might cough because of a cold or the flu, bronchitis, or asthma. Smoking, postnasal drip, allergies, and stomach acid that backs up into your throat also can cause coughs. A cough is a symptom, not a disease.  Most coughs stop when the cause, such as a cold, goes away. You can take a few steps at home to cough less and feel better. Follow-up care is a key part of your treatment and safety. Be sure to make and go to all appointments, and call your doctor if you are having problems. It's also a good idea to know your test results and keep a list of the medicines you take. How can you care for yourself at home? · Drink lots of water and other fluids. This helps thin the mucus and soothes a dry or sore throat. Honey or lemon juice in hot water or tea may ease a dry cough. · Take cough medicine as directed by your doctor. · Prop up your head on pillows to help you breathe and ease a dry cough. · Try cough drops to soothe a dry or sore throat. Cough drops don't stop a cough. Medicine-flavored cough drops are no better than candy-flavored drops or hard candy. · Do not smoke. Avoid secondhand smoke. If you need help quitting, talk to your doctor about stop-smoking programs and medicines. These can increase your chances of quitting for good. When should you call for help? Call 911 anytime you think you may need emergency care. For example, call if:    · You have severe trouble breathing.    Call your doctor now or seek immediate medical care if:    · You cough up blood.     · You have new or worse trouble breathing.     · You have a new or higher fever.     · You have a new rash.    Watch closely for changes in your health, and be sure to contact your doctor if:    · You cough more deeply or more often, especially if you notice more mucus or a change in the color of your mucus.     · You have new symptoms, such as a sore throat, an earache, or sinus pain.     · You do not get better as expected. Where can you learn more? Go to http://yesenia-anshu.info/. Enter D279 in the search box to learn more about \"Cough: Care Instructions. \"  Current as of: December 6, 2017  Content Version: 11.8  © 7336-7884 [employer].  Care instructions adapted under license by [employer] (which disclaims liability or warranty for this information). If you have questions about a medical condition or this instruction, always ask your healthcare professional. [address] any warranty or liability for your use of this information.

## 2018-12-19 NOTE — PROGRESS NOTES
Hector Vargas is an 61 y.o. female who presents for sore throat and cough. Has has symptoms for 5-6 days. no nausea and no vomiting . she has not had sinus tenderness, SOB, wheezing, productive cough, headache, chills, and hoarseness. Symptoms are moderate. Over-the-counter remedies including Nyquil has been used with poor relief of symptoms. 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 Allergies - reviewed: Allergies   Allergen Reactions    Aspirin Hives     Tolerates ibuprofen and naproxen    Clindamycin Rash    Hydrocodone Nausea and Vomiting     Patient stated she is not allergic at all. Medications - reviewed:   Current Outpatient Medications   Medication Sig    DM/p-ephed/acetaminoph/doxylam (NYQUIL PO) Take  by mouth.  dextromethorphan HBr (THERAFLU COUGH PO) Take  by mouth.  metFORMIN (GLUCOPHAGE) 1,000 mg tablet TAKE 1 TABLET BY MOUTH TWICE DAILY WITH MEALS    traMADol (ULTRAM) 50 mg tablet Take 1 Tab by mouth every six (6) hours as needed for Pain. Max Daily Amount: 200 mg.    rosuvastatin (CRESTOR) 10 mg tablet TAKE 1 TABLET BY MOUTH DAILY    gabapentin (NEURONTIN) 300 mg capsule Take 2 Caps by mouth nightly.  amLODIPine-benazepril (LOTREL) 10-20 mg per capsule Take 1 Cap by mouth daily.  hydroCHLOROthiazide (HYDRODIURIL) 25 mg tablet TAKE 1 TABLET BY MOUTH DAILY    multivitamin (ONE A DAY) tablet Take 1 Tab by mouth daily. With B12    dulaglutide (TRULICITY) 1.5 QY/5.8 mL sub-q pen 0.5 mL by SubCUTAneous route every seven (7) days.  glipiZIDE (GLUCOTROL) 10 mg tablet Take 1 Tab by mouth two (2) times a day.  glucose blood VI test strips (ACCU-CHEK SMARTVIEW TEST STRIP) strip Check BS once weekly    omega-3 fatty acids-vitamin e (FISH OIL) 1,000 mg Cap Take 1 Cap by mouth daily.     valACYclovir (VALTREX) 500 mg tablet Take 1 Tab by mouth two (2) times a day.  metFORMIN (GLUCOPHAGE) 1,000 mg tablet Take 1 Tab by mouth two (2) times daily (with meals). No current facility-administered medications for this visit. Past Medical History - reviewed:  Past Medical History:   Diagnosis Date    Diabetes ([facility] 1995    Genital herpes simplex type 2     GERD (gastroesophageal reflux disease)     no medication prescribed    Hypercholesterolemia     Hypertension     Narcolepsy     Neuropathy in diabetes ([facility]      Immunizations - reviewed:   Immunization History   Administered Date(s) Administered    Pneumococcal Polysaccharide (PPSV-23) 12/13/2016    Tdap 02/02/2018     ROS  Subjective fever. No chills   Cough. No SOB  No N/V/D      Physical Exam  Visit Vitals  /70   Pulse 79   Temp 98.1 °F (36.7 °C) (Oral)   Resp 16   Ht 5' 3\" (1.6 m)   Wt 185 lb (83.9 kg)   LMP 01/01/2008 (LMP Unknown)   SpO2 98%   BMI 32.77 kg/m²       General appearance - alert, well appearing, and in no distress and well hydrated  Eyes - pupils equal and reactive, extraocular eye movements intact  Ears - bilateral TM's and external ear canals normal  Nose - normal nontender sinuses and mucosal congestion  Mouth - mucous membranes moist, tonsil erythematous without exudate, tongue normal. Postnasal drip. Neck - supple, no significant adenopathy  Chest - clear to auscultation, no wheezes, rales or rhonchi, symmetric air entry  Heart - normal rate, regular rhythm, normal S1, S2, no murmurs, rubs, clicks or gallops  Abdomen - soft, nontender, nondistended, no masses or organomegaly    Personally reviewed:  Negative rapid strep and influenza tests. Assessment/Plan    ICD-10-CM ICD-9-CM    1. Sore throat J02.9 462 AMB POC RAPID STREP A   2. Generalized body aches R52 780.96 AMB POC ALAN INFLUENZA A/B TEST   3. Postnasal drip R09.82 784.91    4.  History of fever Z87.898 V13.89 AMB POC RAPID STREP A      AMB POC ALAN INFLUENZA A/B TEST Symptoms likely to viral etiology. Negative rapid strep and influenza today. VSS. PE reassuring. Tolerated fluids in office today. Will treat supportively:   1. Combination cough and could medicine such as Mucinex  2. Salt water gargle. 3. Plenty of fluids. 4. Soups  5. Acetaminophen (Tylenol):  500mg 1-2 tablets every 6 hours as needed for pain and fever. 6. Throat lozenges such as Halls as needed. 7. Humidifier as needed. 8. Flonase   9. chloroseptic spray    Follow Up:  Get re-examined if not improved in  5-7 days or if symptoms worsen. If you get suddenly worse, go to the nearest hospital Emergency Room      I have discussed the diagnosis with the patient and the intended plan as seen in the above orders. Patient verbalized understanding of the plan and agrees with the plan. The patient has received an after-visit summary and questions were answered concerning future plans. I have discussed medication side effects and warnings with the patient as well. Informed patient to return to the office if new symptoms arise.         Minnie Hughes,   Family Medicine Resident

## 2018-12-19 NOTE — PROGRESS NOTES
Chief Complaint   Patient presents with    Sore Throat     times 6 days    Cough     1. Have you been to the ER, urgent care clinic since your last visit? Hospitalized since your last visit? No    2. Have you seen or consulted any other health care providers outside of the [facility] since your last visit? Include any pap smears or colon screening.  No

## 2019-02-02 DIAGNOSIS — E78.00 HYPERCHOLESTEROLEMIA: ICD-10-CM

## 2019-02-02 RX ORDER — ROSUVASTATIN CALCIUM 10 MG/1
TABLET, COATED ORAL
Qty: 90 TAB | Refills: 0 | Status: SHIPPED | OUTPATIENT
Start: 2019-02-02 | End: 2019-04-28 | Stop reason: SDUPTHER

## 2019-02-07 ENCOUNTER — OFFICE VISIT (OUTPATIENT)
Dept: ENDOCRINOLOGY | Age: 64
End: 2019-02-07

## 2019-02-07 VITALS
WEIGHT: 190.6 LBS | OXYGEN SATURATION: 96 % | RESPIRATION RATE: 16 BRPM | HEIGHT: 63 IN | SYSTOLIC BLOOD PRESSURE: 129 MMHG | TEMPERATURE: 97.4 F | DIASTOLIC BLOOD PRESSURE: 79 MMHG | BODY MASS INDEX: 33.77 KG/M2 | HEART RATE: 93 BPM

## 2019-02-07 DIAGNOSIS — E11.65 TYPE 2 DIABETES MELLITUS WITH HYPERGLYCEMIA, WITHOUT LONG-TERM CURRENT USE OF INSULIN (HCC): Primary | ICD-10-CM

## 2019-02-07 DIAGNOSIS — E78.00 HYPERCHOLESTEROLEMIA: ICD-10-CM

## 2019-02-07 DIAGNOSIS — E11.65 TYPE 2 DIABETES MELLITUS WITH HYPERGLYCEMIA, UNSPECIFIED WHETHER LONG TERM INSULIN USE (HCC): ICD-10-CM

## 2019-02-07 DIAGNOSIS — I10 ESSENTIAL HYPERTENSION: ICD-10-CM

## 2019-02-07 LAB
GLUCOSE POC: 225 MG/DL
HBA1C MFR BLD HPLC: 7.5 %

## 2019-02-07 RX ORDER — GLIPIZIDE 10 MG/1
10 TABLET ORAL 2 TIMES DAILY
Qty: 180 TAB | Refills: 3 | Status: SHIPPED | OUTPATIENT
Start: 2019-02-07 | End: 2019-06-12 | Stop reason: SDUPTHER

## 2019-02-07 NOTE — PROGRESS NOTES
Tamir Hdez is a 61 y.o. female here for   Chief Complaint   Patient presents with    Diabetes       Functional glucose monitor and record keeping system? -yes   Eye exam within last year? - on file  Foot exam within last year? - due    1. Have you been to the ER, urgent care clinic since your last visit? Hospitalized since your last visit? -no    2. Have you seen or consulted any other health care providers outside of the [address] [place] [address] since your last visit?   Include any pap smears or colon screening.-no
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
## 2019-02-19 DIAGNOSIS — E11.42 DIABETIC POLYNEUROPATHY ASSOCIATED WITH TYPE 2 DIABETES MELLITUS (HCC): ICD-10-CM

## 2019-02-21 RX ORDER — TRAMADOL HYDROCHLORIDE 50 MG/1
TABLET ORAL
Qty: 120 TAB | Refills: 0 | OUTPATIENT
Start: 2019-02-21

## 2019-02-26 ENCOUNTER — OFFICE VISIT (OUTPATIENT)
Dept: FAMILY MEDICINE CLINIC | Age: 64
End: 2019-02-26

## 2019-02-26 VITALS
OXYGEN SATURATION: 98 % | HEIGHT: 63 IN | WEIGHT: 194 LBS | TEMPERATURE: 98.3 F | DIASTOLIC BLOOD PRESSURE: 81 MMHG | BODY MASS INDEX: 34.38 KG/M2 | HEART RATE: 91 BPM | RESPIRATION RATE: 16 BRPM | SYSTOLIC BLOOD PRESSURE: 129 MMHG

## 2019-02-26 DIAGNOSIS — Z12.39 BREAST CANCER SCREENING: ICD-10-CM

## 2019-02-26 DIAGNOSIS — E11.42 DIABETIC POLYNEUROPATHY ASSOCIATED WITH TYPE 2 DIABETES MELLITUS (HCC): Primary | ICD-10-CM

## 2019-02-26 DIAGNOSIS — B35.1 FUNGAL TOENAIL INFECTION: ICD-10-CM

## 2019-02-26 RX ORDER — TRAMADOL HYDROCHLORIDE 50 MG/1
50 TABLET ORAL
Qty: 120 TAB | Refills: 2 | Status: SHIPPED | OUTPATIENT
Start: 2019-02-26 | End: 2019-03-28
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
## 2019-02-26 NOTE — PROGRESS NOTES
1. Have you been to the ER, urgent care clinic since your last visit? Hospitalized since your last visit? No    2. Have you seen or consulted any other health care providers outside of the [address] [place] [address] since your last visit? Include any pap smears or colon screening. No    Chief Complaint   Patient presents with    Medication Refill     Tramadol     Patient states nothing changed on medication list, states taking all medications. Patient states neuropathy in bilateral feet 10/10 on pain scale. Blood pressure 129/81, pulse 91, temperature 98.3 °F (36.8 °C), temperature source Oral, resp. rate 16, height 5' 3\" (1.6 m), weight 194 lb (88 kg), last menstrual period 01/01/2008, SpO2 98 %.

## 2019-03-05 ENCOUNTER — OFFICE VISIT (OUTPATIENT)
Dept: FAMILY MEDICINE CLINIC | Age: 64
End: 2019-03-05

## 2019-03-07 ENCOUNTER — OFFICE VISIT (OUTPATIENT)
Dept: FAMILY MEDICINE CLINIC | Age: 64
End: 2019-03-07

## 2019-03-07 VITALS
HEART RATE: 93 BPM | SYSTOLIC BLOOD PRESSURE: 124 MMHG | RESPIRATION RATE: 16 BRPM | DIASTOLIC BLOOD PRESSURE: 79 MMHG | BODY MASS INDEX: 34.12 KG/M2 | HEIGHT: 63 IN | TEMPERATURE: 98.7 F | WEIGHT: 192.6 LBS | OXYGEN SATURATION: 96 %

## 2019-03-07 DIAGNOSIS — M75.101 ROTATOR CUFF SYNDROME OF RIGHT SHOULDER: Primary | ICD-10-CM

## 2019-03-07 DIAGNOSIS — M25.511 ACUTE PAIN OF RIGHT SHOULDER: ICD-10-CM

## 2019-03-07 DIAGNOSIS — M79.18 MYOFASCIAL MUSCLE PAIN: ICD-10-CM

## 2019-03-07 DIAGNOSIS — M54.9 UPPER BACK PAIN ON LEFT SIDE: ICD-10-CM

## 2019-03-07 DIAGNOSIS — G56.01 CARPAL TUNNEL SYNDROME OF RIGHT WRIST: ICD-10-CM

## 2019-03-07 RX ORDER — TRIAMCINOLONE ACETONIDE 40 MG/ML
40 INJECTION, SUSPENSION INTRA-ARTICULAR; INTRAMUSCULAR ONCE
Qty: 1 ML | Refills: 0
Start: 2019-03-07 | End: 2019-03-07

## 2019-03-07 RX ORDER — LIDOCAINE HYDROCHLORIDE 10 MG/ML
5 INJECTION INFILTRATION; PERINEURAL ONCE
Qty: 2 ML | Refills: 0
Start: 2019-03-07 | End: 2019-03-07

## 2019-03-07 RX ORDER — LIDOCAINE HYDROCHLORIDE 10 MG/ML
4 INJECTION, SOLUTION EPIDURAL; INFILTRATION; INTRACAUDAL; PERINEURAL ONCE
Qty: 4 ML | Refills: 0
Start: 2019-03-07 | End: 2019-03-07

## 2019-03-07 RX ORDER — LIDOCAINE HYDROCHLORIDE 10 MG/ML
3 INJECTION INFILTRATION; PERINEURAL ONCE
Qty: 3 ML | Refills: 0
Start: 2019-03-07 | End: 2019-03-07

## 2019-03-07 RX ORDER — TRIAMCINOLONE ACETONIDE 40 MG/ML
40 INJECTION, SUSPENSION INTRA-ARTICULAR; INTRAMUSCULAR ONCE
Qty: 1 VIAL | Refills: 0
Start: 2019-03-07 | End: 2019-03-07

## 2019-03-07 NOTE — PATIENT INSTRUCTIONS
Shoulder Pain: Care Instructions  Your Care Instructions    You can hurt your shoulder by using it too much during an activity, such as fishing or baseball. It can also happen as part of the everyday wear and tear of getting older. Shoulder injuries can be slow to heal, but your shoulder should get better with time. Your doctor may recommend a sling to rest your shoulder. If you have injured your shoulder, you may need testing and treatment. Follow-up care is a key part of your treatment and safety. Be sure to make and go to all appointments, and call your doctor if you are having problems. It's also a good idea to know your test results and keep a list of the medicines you take. How can you care for yourself at home? · Take pain medicines exactly as directed. ? If the doctor gave you a prescription medicine for pain, take it as prescribed. ? If you are not taking a prescription pain medicine, ask your doctor if you can take an over-the-counter medicine. ? Do not take two or more pain medicines at the same time unless the doctor told you to. Many pain medicines contain acetaminophen, which is Tylenol. Too much acetaminophen (Tylenol) can be harmful. · If your doctor recommends that you wear a sling, use it as directed. Do not take it off before your doctor tells you to. · Put ice or a cold pack on the sore area for 10 to 20 minutes at a time. Put a thin cloth between the ice and your skin. · If there is no swelling, you can put moist heat, a heating pad, or a warm cloth on your shoulder. Some doctors suggest alternating between hot and cold. · Rest your shoulder for a few days. If your doctor recommends it, you can then begin gentle exercise of the shoulder, but do not lift anything heavy. When should you call for help? Call 911 anytime you think you may need emergency care. For example, call if:    · You have chest pain or pressure. This may occur with:  ? Sweating. ?  Shortness of breath. ? Nausea or vomiting. ? Pain that spreads from the chest to the neck, jaw, or one or both shoulders or arms. ? Dizziness or lightheadedness. ? A fast or uneven pulse. After calling 911, chew 1 adult-strength aspirin. Wait for an ambulance. Do not try to drive yourself.     · Your arm or hand is cool or pale or changes color.    Call your doctor now or seek immediate medical care if:    · You have signs of infection, such as:  ? Increased pain, swelling, warmth, or redness in your shoulder. ? Red streaks leading from a place on your shoulder. ? Pus draining from an area of your shoulder. ? Swollen lymph nodes in your neck, armpits, or groin. ? A fever.    Watch closely for changes in your health, and be sure to contact your doctor if:    · You cannot use your shoulder.     · Your shoulder does not get better as expected. Where can you learn more? Go to http://yesenia-anshu.info/. Enter K207 in the search box to learn more about \"Shoulder Pain: Care Instructions. \"  Current as of: September 20, 2018  Content Version: 11.9  © 7491-8620 [employer]. Care instructions adapted under license by [employer] (which disclaims liability or warranty for this information). If you have questions about a medical condition or this instruction, always ask your healthcare professional. [address] any warranty or liability for your use of this information.
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
## 2019-03-07 NOTE — PROGRESS NOTES
Chief Complaint   Patient presents with    Back Pain      upper back pain x 2 weeks     Blood pressure 124/79, pulse 93, temperature 98.7 °F (37.1 °C), temperature source Oral, resp. rate 16, height 5' 3\" (1.6 m), weight 192 lb 9.6 oz (87.4 kg), last menstrual period 01/01/2008, SpO2 96 %. 1. Have you been to the ER, urgent care clinic since your last visit? Hospitalized since your last visit? No    2. Have you seen or consulted any other health care providers outside of the [address] [place] [address] since your last visit? Include any pap smears or colon screening.  No

## 2019-03-09 RX ORDER — METFORMIN HYDROCHLORIDE 1000 MG/1
TABLET ORAL
Qty: 180 TAB | Refills: 0 | Status: SHIPPED | OUTPATIENT
Start: 2019-03-09 | End: 2019-06-12 | Stop reason: SDUPTHER

## 2019-03-13 RX ORDER — VALACYCLOVIR HYDROCHLORIDE 500 MG/1
500 TABLET, FILM COATED ORAL 2 TIMES DAILY
Qty: 6 TAB | Refills: 6 | Status: SHIPPED | OUTPATIENT
Start: 2019-03-13 | End: 2019-03-14 | Stop reason: SDUPTHER

## 2019-03-14 RX ORDER — VALACYCLOVIR HYDROCHLORIDE 500 MG/1
500 TABLET, FILM COATED ORAL 2 TIMES DAILY
Qty: 6 TAB | Refills: 6 | Status: SHIPPED | OUTPATIENT
Start: 2019-03-14 | End: 2019-06-25

## 2019-04-16 PROBLEM — Z12.11 COLON CANCER SCREENING: Status: ACTIVE | Noted: 2019-04-16

## 2019-04-28 DIAGNOSIS — E78.00 HYPERCHOLESTEROLEMIA: ICD-10-CM

## 2019-04-28 RX ORDER — ROSUVASTATIN CALCIUM 10 MG/1
TABLET, COATED ORAL
Qty: 90 TAB | Refills: 0 | Status: SHIPPED | OUTPATIENT
Start: 2019-04-28 | End: 2019-07-31 | Stop reason: SDUPTHER

## 2019-05-07 ENCOUNTER — OFFICE VISIT (OUTPATIENT)
Dept: FAMILY MEDICINE CLINIC | Age: 64
End: 2019-05-07

## 2019-05-07 VITALS
SYSTOLIC BLOOD PRESSURE: 106 MMHG | TEMPERATURE: 98.7 F | RESPIRATION RATE: 16 BRPM | WEIGHT: 189.8 LBS | DIASTOLIC BLOOD PRESSURE: 71 MMHG | HEIGHT: 63 IN | BODY MASS INDEX: 33.63 KG/M2 | HEART RATE: 94 BPM | OXYGEN SATURATION: 98 %

## 2019-05-07 DIAGNOSIS — M19.011 ARTHRITIS OF RIGHT SHOULDER REGION: ICD-10-CM

## 2019-05-07 DIAGNOSIS — M53.3 SACROILIAC JOINT DYSFUNCTION OF LEFT SIDE: Primary | ICD-10-CM
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
## 2019-05-07 NOTE — PATIENT INSTRUCTIONS
Sacroiliac Joint Pain: Care Instructions  Your Care Instructions    The sacroiliac joints connect the spine and each side of the pelvis. These joints bear the weight and stress of your torso. This makes them easy to injure. Injury or overuse of these joints may cause low back pain. Stress on these joints can cause joint pain. Sacroiliac joint pain is more common in pregnant women. Certain kinds of arthritis also may cause this type of joint pain. Home treatment may help you feel better. So can avoiding activities that stress your back. Your doctor also may recommend physical therapy. This may include doing exercises and stretches to help with pain. You may also learn to use good posture. Follow-up care is a key part of your treatment and safety. Be sure to make and go to all appointments, and call your doctor if you are having problems. It's also a good idea to know your test results and keep a list of the medicines you take. How can you care for yourself at home? · Ask your doctor about light exercises that may help your back pain. Try to do light activity throughout the day. But make sure to take rests as needed. Find a comfortable position for rest, but don't stay in one position for too long. Avoid activities that cause pain. · To apply heat, put a warm water bottle, a heating pad set on low, or a warm cloth on your back. Do not go to sleep with a heating pad on your skin. · Put ice or a cold pack on your back for 10 to 20 minutes at a time. Put a thin cloth between the ice and your skin. · If the doctor gave you a prescription medicine for pain, take it as prescribed. · If you are not taking a prescription pain medicine, ask your doctor if you can take an over-the-counter pain medicine, such as acetaminophen (Tylenol), ibuprofen (Advil, Motrin), or naproxen (Aleve). Read and follow all instructions on the label. Take pain medicines exactly as directed.   · Do not take two or more pain medicines at the same time unless the doctor told you to. Many pain medicines have acetaminophen, which is Tylenol. Too much acetaminophen (Tylenol) can be harmful. · To prevent future back pain, do exercises to stretch and strengthen your back and stomach. Learn how to use good posture, safe lifting techniques, and proper body mechanics. When should you call for help? Call 911 anytime you think you may need emergency care. For example, call if:    · You are unable to move a leg at all.   [facility] your doctor now or seek immediate medical care if:    · You have new or worse symptoms in your legs or buttocks. Symptoms may include:  ? Numbness or tingling. ? Weakness. ? Pain.     · You lose bladder or bowel control.    Watch closely for changes in your health, and be sure to contact your doctor if:    · You are not getting better as expected. Where can you learn more? Go to http://yesenia-anshu.info/. Enter I168 in the search box to learn more about \"Sacroiliac Joint Pain: Care Instructions. \"  Current as of: September 20, 2018  Content Version: 11.9  © 2168-7760 [employer]. Care instructions adapted under license by [employer] (which disclaims liability or warranty for this information). If you have questions about a medical condition or this instruction, always ask your healthcare professional. [address] any warranty or liability for your use of this information.

## 2019-05-07 NOTE — PROGRESS NOTES
Chief Complaint   Patient presents with    Back Pain     follow up on lower back pain      Blood pressure 106/71, pulse 94, temperature 98.7 °F (37.1 °C), temperature source Oral, resp. rate 16, height 5' 3\" (1.6 m), weight 189 lb 12.8 oz (86.1 kg), last menstrual period 01/01/2008, SpO2 98 %. 1. Have you been to the ER, urgent care clinic since your last visit? Hospitalized since your last visit? No    2. Have you seen or consulted any other health care providers outside of the [address] [place] [address] since your last visit? Include any pap smears or colon screening.  No

## 2019-05-30 ENCOUNTER — TELEPHONE (OUTPATIENT)
Dept: FAMILY MEDICINE CLINIC | Age: 64
End: 2019-05-30

## 2019-05-30 DIAGNOSIS — M48.061 SPINAL STENOSIS OF LUMBAR REGION, UNSPECIFIED WHETHER NEUROGENIC CLAUDICATION PRESENT: ICD-10-CM

## 2019-05-30 DIAGNOSIS — M51.26 LUMBAR HERNIATED DISC: Primary | ICD-10-CM

## 2019-05-30 RX ORDER — TRAMADOL HYDROCHLORIDE 50 MG/1
TABLET ORAL
Qty: 120 TAB | Refills: 0 | Status: SHIPPED | OUTPATIENT
Start: 2019-05-30 | End: 2019-06-29

## 2019-06-12 ENCOUNTER — OFFICE VISIT (OUTPATIENT)
Dept: FAMILY MEDICINE CLINIC | Age: 64
End: 2019-06-12

## 2019-06-12 VITALS
OXYGEN SATURATION: 98 % | HEART RATE: 91 BPM | HEIGHT: 63 IN | TEMPERATURE: 98.9 F | RESPIRATION RATE: 18 BRPM | WEIGHT: 196 LBS | SYSTOLIC BLOOD PRESSURE: 123 MMHG | BODY MASS INDEX: 34.73 KG/M2 | DIASTOLIC BLOOD PRESSURE: 77 MMHG

## 2019-06-12 DIAGNOSIS — Z12.11 COLON CANCER SCREENING: ICD-10-CM

## 2019-06-12 DIAGNOSIS — E11.21 TYPE 2 DIABETES MELLITUS WITH DIABETIC NEPHROPATHY, WITHOUT LONG-TERM CURRENT USE OF INSULIN (HCC): ICD-10-CM

## 2019-06-12 DIAGNOSIS — R21 SKIN RASH: Primary | ICD-10-CM

## 2019-06-12 DIAGNOSIS — Z12.39 BREAST CANCER SCREENING: ICD-10-CM

## 2019-06-12 RX ORDER — GLIPIZIDE 10 MG/1
TABLET ORAL
Qty: 180 TAB | Refills: 3
Start: 2019-06-12 | End: 2020-02-24 | Stop reason: SDUPTHER

## 2019-06-12 RX ORDER — NYSTATIN 100000 U/G
CREAM TOPICAL 2 TIMES DAILY
Qty: 60 G | Refills: 1 | Status: SHIPPED | OUTPATIENT
Start: 2019-06-12 | End: 2021-04-26 | Stop reason: SDUPTHER

## 2019-06-12 RX ORDER — UREA 10 %
100 LOTION (ML) TOPICAL DAILY
COMMUNITY
End: 2020-02-24 | Stop reason: SDUPTHER

## 2019-06-12 NOTE — PROGRESS NOTES
Maria Eugenia Foster is an 61 y.o. female who presents for:    1) rash under her breasts and pannus -- red, slightly raised, pruritic    2)  Right gluteal mass -- approximately 8 cm in size, soft, mobile; states has grown in size    Due for mammogram     Last PAP smear 2/2/18    3)  Chronic pain from diabetic neuropathy (feet) -- pt is currently taking tramadol up to 4 times daily; discussed need for her to seek out chronic pain specialist due to change in regulations regarding primary care's ability to prescribe opioid medications on a long term basis    Pt made aware that she had missed her appointment with Dr. Eugenie Acosta the day before; she had inadvertently forgot her appointment    4)  Lumbar pain -- states that she is seeing orthopedics for this pain; taking gabapentin; states gabapentin does not relieve her diabetic foot pain    Allergies - reviewed: Allergies   Allergen Reactions    Aspirin Hives     Tolerates ibuprofen and naproxen    Clindamycin Rash    Hydrocodone Nausea and Vomiting     Patient stated she is not allergic at all. Medications - reviewed:   Current Outpatient Medications   Medication Sig    cyanocobalamin (VITAMIN B12) 100 mcg tablet Take 100 mcg by mouth daily.  nystatin (MYCOSTATIN) topical cream Apply  to affected area two (2) times a day.  glipiZIDE (GLUCOTROL) 10 mg tablet TAKE 1 TABLET BY MOUTH TWICE DAILY    traMADol (ULTRAM) 50 mg tablet TAKE 1 TABLET BY MOUTH EVERY 6 HOURS AS NEEDED FOR PAIN FOR 30 DAYS; MAX 4 TABLETS DAILY.  gabapentin (NEURONTIN) 300 mg capsule Take 2 Caps by mouth two (2) times daily as needed for Pain.  rosuvastatin (CRESTOR) 10 mg tablet TAKE 1 TABLET BY MOUTH DAILY    valACYclovir (VALTREX) 500 mg tablet Take 1 Tab by mouth two (2) times a day.  dulaglutide (TRULICITY) 1.5 XQ/8.7 mL sub-q pen 0.5 mL by SubCUTAneous route every seven (7) days.     amLODIPine-benazepril (LOTREL) 10-20 mg per capsule Take 1 Cap by mouth daily.    hydroCHLOROthiazide (HYDRODIURIL) 25 mg tablet TAKE 1 TABLET BY MOUTH DAILY    metFORMIN (GLUCOPHAGE) 1,000 mg tablet Take 1 Tab by mouth two (2) times daily (with meals).  glucose blood VI test strips (ACCU-CHEK SMARTVIEW TEST STRIP) strip Check BS once weekly    omega-3 fatty acids-vitamin e (FISH OIL) 1,000 mg Cap Take 1 Cap by mouth daily. No current facility-administered medications for this visit.         Problem List - reviewed:  Patient Active Problem List   Diagnosis Code    Hypertension I10    Diabetes ([facility] [facility] E11.9    Hypercholesterolemia E78.00    Neuropathy due to secondary diabetes ([facility] [facility] E13.40    Lumbar stenosis M48.061    Lumbar herniated disc M51.26    Postcoital UTI N39.0    Type 2 diabetes mellitus with diabetic nephropathy, without long-term current use of insulin (HCC) E11.21    Type 2 diabetes mellitus with hyperglycemia, without long-term current use of insulin (HCC) E11.65    Closed nondisplaced fracture of first cervical vertebra (HCC) S12.001A    Closed fracture of multiple ribs of right side with routine healing S22.41XD    Type 2 diabetes mellitus with diabetic neuropathy (HCC) E11.40    HSV-2 infection B00.9    Breast cancer screening Z12.31    Cervical cancer screening Z12.4    Colon cancer screening Z12.11         Past Medical History - reviewed:  Past Medical History:   Diagnosis Date    Diabetes ([facility] [address]     Genital herpes simplex type 2     GERD (gastroesophageal reflux disease)     no medication prescribed    Hypercholesterolemia     Hypertension     Narcolepsy     Neuropathy in diabetes [facility])          Past Surgical History - reviewed:   Past Surgical History:   Procedure Laterality Date    HX  SECTION  03/15/1979    HX  SECTION  1990    HX MOHS PROCEDURES Right 2007    HX ORTHOPAEDIC Bilateral 2005    KNEE REPLACEMENT    OK EXTRAC ERUPTED TOOTH/EXPOSED ROOT Right 14    3 UPPER RIGHT SIDE BACK TEETH Social History - reviewed:  Social History     Socioeconomic History    Marital status:      Spouse name: Not on file    Number of children: Not on file    Years of education: Not on file    Highest education level: Not on file   Occupational History    Not on file   Social Needs    Financial resource strain: Not on file    Food insecurity:     Worry: Not on file     Inability: Not on file    Transportation needs:     Medical: Not on file     Non-medical: Not on file   Tobacco Use    Smoking status: Never Smoker    Smokeless tobacco: Never Used   Substance and Sexual Activity    Alcohol use: No     Comment: rarely    Drug use: No    Sexual activity: Yes     Partners: Male     Birth control/protection: None   Lifestyle    Physical activity:     Days per week: Not on file     Minutes per session: Not on file    Stress: Not on file   Relationships    Social connections:     Talks on phone: Not on file     Gets together: Not on file     Attends Christian service: Not on file     Active member of club or organization: Not on file     Attends meetings of clubs or organizations: Not on file     Relationship status: Not on file    Intimate partner violence:     Fear of current or ex partner: Not on file     Emotionally abused: Not on file     Physically abused: Not on file     Forced sexual activity: Not on file   Other Topics Concern    Not on file   Social History Narrative    Not on file         Family History - reviewed:  Family History   Problem Relation Age of Onset    Diabetes Mother     Heart Disease Mother     Hypertension Mother     Arthritis-rheumatoid Mother     Diabetes Father     Heart Disease Father     Cancer Maternal Aunt         bone    Diabetes Sister          ROS  Review of Systems - refer to findings in HPI    Physical Exam  Visit Vitals  /77 (BP 1 Location: Right arm, BP Patient Position: Sitting)   Pulse 91   Temp 98.9 °F (37.2 °C) (Oral)   Resp 18   Ht 5' 3\" (1.6 m)   Wt 196 lb (88.9 kg)   LMP 01/01/2008 (LMP Unknown)   SpO2 98%   BMI 34.72 kg/m²       General appearance - alert, well appearing, and in no distress  Eyes - pupils equal and reactive, extraocular eye movements intact  Ears - bilateral TM's and external ear canals normal  Nose - normal and patent, no erythema, discharge or polyps  Mouth - mucous membranes moist, pharynx normal without lesions  Neck - supple, no significant adenopathy  Chest - clear to auscultation, no wheezes, rales or rhonchi, symmetric air entry  Heart - normal rate, regular rhythm, normal S1, S2, no murmurs  Abdomen - soft, nontender, nondistended  Neurological - alert, oriented, normal speech, no focal findings or movement disorder noted  Musculoskeletal - no joint tenderness, deformity or swelling  Extremities - peripheral pulses normal  Psych - normal mood and affect   Skin -- hyperpigmented erythematous raised rash under her breasts and pannus (suspect yeast infection); 8 cm soft, mobile mass mid-right gluteus, nontender      Assessment/Plan    ICD-10-CM ICD-9-CM    1. Skin rash R21 782.1    2. Colon cancer screening Z12.11 V76.51 REFERRAL TO GASTROENTEROLOGY   3. Mass R22.9 782.2 REFERRAL TO GENERAL SURGERY   4. Breast cancer screening Z12.31 V76.10 BRADEN 3D BASSAM W MAMMO BI SCREENING INCL CAD   5. Type 2 diabetes mellitus with diabetic nephropathy, without long-term current use of insulin (HCC) E11.21 250.40 LIPID PANEL     465.04 METABOLIC PANEL, COMPREHENSIVE      HEMOGLOBIN A1C WITH EAG      CANCELED: 10-PANEL URINE DRUG SCREEN     Declined shingles vaccine    To obtain urine drug screen today as patient reports that she has been taking tramadol    Pt to return for fasting labs    I have discussed the diagnosis with the patient and the intended plan as seen in the above orders. The patient has received an after-visit summary and questions were answered concerning future plans.   I have discussed medication side effects and warnings with the patient as well.       Ok Modi MD

## 2019-06-13 ENCOUNTER — LAB ONLY (OUTPATIENT)
Dept: FAMILY MEDICINE CLINIC | Age: 64
End: 2019-06-13

## 2019-06-13 DIAGNOSIS — M51.26 LUMBAR HERNIATED DISC: Primary | ICD-10-CM

## 2019-06-13 DIAGNOSIS — Z79.899 ENCOUNTER FOR LONG-TERM (CURRENT) USE OF MEDICATIONS: ICD-10-CM

## 2019-06-14 LAB
ALBUMIN SERPL-MCNC: 4 G/DL (ref 3.6–4.8)
ALBUMIN/GLOB SERPL: 1.4 {RATIO} (ref 1.2–2.2)
ALP SERPL-CCNC: 85 IU/L (ref 39–117)
ALT SERPL-CCNC: 17 IU/L (ref 0–32)
AST SERPL-CCNC: 16 IU/L (ref 0–40)
BILIRUB SERPL-MCNC: <0.2 MG/DL (ref 0–1.2)
BUN SERPL-MCNC: 18 MG/DL (ref 8–27)
BUN/CREAT SERPL: 15 (ref 12–28)
CALCIUM SERPL-MCNC: 9.3 MG/DL (ref 8.7–10.3)
CHLORIDE SERPL-SCNC: 103 MMOL/L (ref 96–106)
CHOLEST SERPL-MCNC: 127 MG/DL (ref 100–199)
CO2 SERPL-SCNC: 26 MMOL/L (ref 20–29)
CREAT SERPL-MCNC: 1.19 MG/DL (ref 0.57–1)
EST. AVERAGE GLUCOSE BLD GHB EST-MCNC: 171 MG/DL
GLOBULIN SER CALC-MCNC: 2.8 G/DL (ref 1.5–4.5)
GLUCOSE SERPL-MCNC: 159 MG/DL (ref 65–99)
HBA1C MFR BLD: 7.6 % (ref 4.8–5.6)
HDLC SERPL-MCNC: 49 MG/DL
INTERPRETATION, 910389: NORMAL
INTERPRETATION: NORMAL
LDLC SERPL CALC-MCNC: 58 MG/DL (ref 0–99)
Lab: NORMAL
PDF IMAGE, 910387: NORMAL
POTASSIUM SERPL-SCNC: 4.3 MMOL/L (ref 3.5–5.2)
PROT SERPL-MCNC: 6.8 G/DL (ref 6–8.5)
SODIUM SERPL-SCNC: 141 MMOL/L (ref 134–144)
TRIGL SERPL-MCNC: 100 MG/DL (ref 0–149)
VLDLC SERPL CALC-MCNC: 20 MG/DL (ref 5–40)

## 2019-06-18 DIAGNOSIS — G56.01 CARPAL TUNNEL SYNDROME OF RIGHT WRIST: ICD-10-CM

## 2019-06-18 DIAGNOSIS — Z12.39 BREAST CANCER SCREENING: ICD-10-CM

## 2019-06-18 NOTE — PROGRESS NOTES
Cholesterol level looks good    HgbA1C is up to 7.6 -- needs to get back in to see Dr. Samaniego Must and get back on her diet    As well kidney function is not as good as it was previously -- probably reflection of lack of glucose control of her diabetes as your BP is in good control    Normal liver function tests    Please remember that you need to get yourself set up with a chronic pain specialist -- our office will not be able to continue prescribing the tramadol for your diabetic foot pain

## 2019-06-19 ENCOUNTER — OFFICE VISIT (OUTPATIENT)
Dept: FAMILY MEDICINE CLINIC | Age: 64
End: 2019-06-19

## 2019-06-19 VITALS
HEIGHT: 63 IN | RESPIRATION RATE: 18 BRPM | BODY MASS INDEX: 34.91 KG/M2 | TEMPERATURE: 98.4 F | OXYGEN SATURATION: 100 % | HEART RATE: 87 BPM | WEIGHT: 197 LBS | DIASTOLIC BLOOD PRESSURE: 80 MMHG | SYSTOLIC BLOOD PRESSURE: 125 MMHG

## 2019-06-19 DIAGNOSIS — M53.3 SACROILIAC JOINT DYSFUNCTION OF LEFT SIDE: ICD-10-CM

## 2019-06-19 DIAGNOSIS — M19.011 ARTHRITIS OF RIGHT SHOULDER REGION: ICD-10-CM

## 2019-06-19 DIAGNOSIS — M54.9 BILATERAL BACK PAIN, UNSPECIFIED BACK LOCATION, UNSPECIFIED CHRONICITY: Primary | ICD-10-CM

## 2019-06-19 NOTE — PROGRESS NOTES
Identified Patient with two Patient identifiers (Name and ). Two Patient Identifiers confirmed. Reviewed record in preparation for visit and have obtained necessary documentation. Chief Complaint   Patient presents with    Pain (Chronic)     follow up with Dr. Júnior Salas regarding - shoulder and back pain       Visit Vitals  /80 (BP 1 Location: Left arm, BP Patient Position: Sitting)   Pulse 87   Temp 98.4 °F (36.9 °C) (Oral)   Resp 18   Ht 5' 3\" (1.6 m)   Wt 197 lb (89.4 kg)   SpO2 100%   BMI 34.90 kg/m²       1. Have you been to the ER, urgent care clinic since your last visit? Hospitalized since your last visit? No    2. Have you seen or consulted any other health care providers outside of the [address] [place] [address] since your last visit? Include any pap smears or colon screening.  No
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
## 2019-06-19 NOTE — PATIENT INSTRUCTIONS
Joint Injections: Care Instructions  Your Care Instructions    Joint injections are shots into a joint, such as the knee. They may be used to put in medicines, such as pain relievers. A corticosteroid, or steroid, shot is used to reduce inflammation in tendons or joints. It is often used to treat problems such as arthritis, tendinitis, and bursitis. Steroids can be injected directly into a painful, inflamed joint. They can also help reduce inflammation of a bursa. A bursa is a sac of fluid. It cushions and lubricates areas where tendons, ligaments, skin, muscles, or bones rub against each other. A steroid shot can sometimes help with short-term pain relief when other treatments haven't worked. If steroid shots help, pain may improve for weeks or months. Follow-up care is a key part of your treatment and safety. Be sure to make and go to all appointments, and call your doctor if you are having problems. It's also a good idea to know your test results and keep a list of the medicines you take. How can you care for yourself at home? · Put ice or a cold pack on the area for 10 to 20 minutes at a time. Put a thin cloth between the ice and your skin. · Ask your doctor if you can take an over-the-counter pain medicine, such as acetaminophen (Tylenol), ibuprofen (Advil, Motrin), or naproxen (Aleve). Be safe with medicines. Read and follow all instructions on the label. · Avoid strenuous activities for several days. In particular, avoid ones that put stress on the area where you got the shot. · If you have dressings over the area, keep them clean and dry. You may remove them when your doctor tells you to. When should you call for help? Call your doctor now or seek immediate medical care if:    · You have signs of infection, such as:  ? Increased pain, swelling, warmth, or redness. ? Red streaks leading from the site. ? Pus draining from the site.   ? A fever.    Watch closely for changes in your health, and be sure to contact your doctor if you have any problems. Where can you learn more? Go to http://yesenia-anshu.info/. Enter N616 in the search box to learn more about \"Joint Injections: Care Instructions. \"  Current as of: September 20, 2018  Content Version: 11.9  © 1544-4971 [employer]. Care instructions adapted under license by [employer] (which disclaims liability or warranty for this information). If you have questions about a medical condition or this instruction, always ask your healthcare professional. [address] any warranty or liability for your use of this information.

## 2019-06-20 LAB — DRUGS UR: NORMAL

## 2019-06-20 NOTE — PROGRESS NOTES
Currently taking tramadol for diabetic neuropathy -- pt will be referred to Pain Management specialist for long term management. Pt advised that we would not be able to prescribe medications on a long term basis due to new  guidelines.

## 2019-06-24 ENCOUNTER — HOSPITAL ENCOUNTER (OUTPATIENT)
Dept: MAMMOGRAPHY | Age: 64
Discharge: HOME | End: 2019-06-24
Attending: FAMILY MEDICINE
Payer: MEDICARE

## 2019-06-24 PROCEDURE — 77063 BREAST TOMOSYNTHESIS BI: CPT

## 2019-06-25 ENCOUNTER — OFFICE VISIT (OUTPATIENT)
Dept: SURGERY | Age: 64
End: 2019-06-25

## 2019-06-25 VITALS
TEMPERATURE: 98.7 F | WEIGHT: 191.31 LBS | HEIGHT: 63 IN | HEART RATE: 82 BPM | DIASTOLIC BLOOD PRESSURE: 72 MMHG | SYSTOLIC BLOOD PRESSURE: 119 MMHG | OXYGEN SATURATION: 96 % | RESPIRATION RATE: 14 BRPM | BODY MASS INDEX: 33.9 KG/M2

## 2019-06-25 DIAGNOSIS — D49.2 SOFT TISSUE TUMOR: ICD-10-CM

## 2019-06-25 PROBLEM — E66.9 OBESITY (BMI 30.0-34.9): Status: ACTIVE | Noted: 2019-06-25

## 2019-06-25 NOTE — PROGRESS NOTES
1. Have you been to the ER, urgent care clinic since your last visit? Hospitalized since your last visit? No    2. Have you seen or consulted any other health care providers outside of the [address] [place] [address] since your last visit? Include any pap smears or colon screening.  No

## 2019-06-25 NOTE — PERIOP NOTES
Called patient to complete PAT interview and she states she is not allergic to Hydrocodone.  Called pharmacy to have medication removed from allergy list.

## 2019-06-25 NOTE — PERIOP NOTES
[address], [address] [facility]                            MAIN OR                                  [phone]   MAIN PRE OP                          [phone]                                                                                AMBULATORY PRE OP          [phone]  PRE-ADMISSION TESTING    [phone]     Surgery Date:   7/1/2019      Is surgery arrival time given by surgeon? NO  If NO, [facility] staff will call you between 3 and 7pm the day before your surgery with your arrival time. (If your surgery is on a Monday, we will call you the Friday before.)    Call [phone] after 7pm Monday-Friday if you did not receive your arrival time. INSTRUCTIONS BEFORE YOUR SURGERY   When You  Arrive   Arrive at the [address] [facility] on the day of your surgery  Have your insurance card, photo ID, and any copayment (if needed)     Food   and   Drink   NO food or drink after midnight the night before surgery    This means NO water, gum, mints, coffee, juice, etc.  No alcohol (beer, wine, liquor) 24 hours before and after surgery     Medications to   TAKE   Morning of Surgery   MEDICATIONS TO TAKE THE MORNING OF SURGERY WITH A SIP OF WATER:    Gabapentin, Systane eye drops, Tramadol if needed    Check with your endocrinologist for instructions regarding your Trulicity. Medications  To  STOP      7 days before surgery    Non-Steroidal anti-inflammatory Drugs (NSAID's): for example, Ibuprofen (Advil, Motrin), Naproxen (Aleve)   Aspirin, if taking for pain    Herbal supplements, vitamins, and fish oil   Other:  (Pain medications not listed above, including Tylenol may be taken)   Blood  Thinners    If you take  Aspirin, Plavix, Coumadin, or any blood-thinning or anti-blood clot medicine, talk to the doctor who prescribed the medications for pre-operative instructions.      Bathing Clothing  Jewelry  Valuables       If you shower the morning of surgery, please do not apply anything to your skin (lotions, powders, deodorant, or makeup, especially mascara)   Follow all special bath instructions (for total joint replacement, spine and bowel surgeries)   Do not shave or trim anywhere 24 hours before surgery   Wear your hair loose or down; no pony-tails, buns, or metal hair clips   Wear loose, comfortable, clean clothes   Wear glasses instead of contacts   Leave money, valuables, and jewelry, including body piercings, at home     Going Home       or Spending the Night    SAME-DAY SURGERY: You must have a responsible adult drive you home and stay with you 24 hours after surgery   ADMITS: If your doctor is keeping you into the hospital after surgery, leave personal belongings/luggage in your car until you have a hospital room number. Hospital discharge time is 12 noon  Drivers must be here before 12 noon unless you are told differently   Special Instructions Free  parking 7am-5pm.         Follow all instructions so your surgery wont be cancelled. Please, be on time. If a situation occurs and you are delayed the day of surgery, call [phone]. If your physical condition changes (like a fever, cold, flu, etc.) call your surgeon. The patient was contacted  via phone. Home medication reviewed and verified during PAT appointment. The patient verbalizes understanding of all instructions and does not  need reinforcement.

## 2019-06-25 NOTE — PROGRESS NOTES
Surgery History and Physical    Subjective:      Heidi Elder is a 61 y.o. black female who presents for evaluation of a lipoma of the right buttock. Mrs. Coreen Kong has had a lump on her right buttock for at least 20 years. The lump has gradually increased in size and is causing some discomfort. She denies any personal or family h/o malignant soft tissue tumors. Past Medical History:   Diagnosis Date    Diabetes ([facility] [address]     Genital herpes simplex type 2     GERD (gastroesophageal reflux disease)     no medication prescribed    Hypercholesterolemia     Hypertension     Narcolepsy     Neuropathy in diabetes ([facility] [address]     Obesity (BMI 30.0-34. 9)      Past Surgical History:   Procedure Laterality Date    HX  SECTION  03/15/1979    HX  SECTION  1990    HX KNEE REPLACEMENT Bilateral 2005    HX MOHS PROCEDURES Right 2007    HX ROTATOR CUFF REPAIR Right     RI EXTRAC ERUPTED TOOTH/EXPOSED ROOT Right 14    3 UPPER RIGHT SIDE BACK TEETH      Family History   Problem Relation Age of Onset    Diabetes Mother     Heart Disease Mother     Hypertension Mother    Pete Feng Arthritis-rheumatoid Mother     Diabetes Father     Heart Disease Father     Cancer Maternal Aunt         bone    Diabetes Sister      Social History     Tobacco Use    Smoking status: Never Smoker    Smokeless tobacco: Never Used   Substance Use Topics    Alcohol use: No     Comment: rarely      Prior to Admission medications    Medication Sig Start Date End Date Taking? Authorizing Provider   cyanocobalamin (VITAMIN B12) 100 mcg tablet Take 100 mcg by mouth daily. Yes Provider, Historical   nystatin (MYCOSTATIN) topical cream Apply  to affected area two (2) times a day.  19  Yes Marisela Kellogg MD   glipiZIDE (GLUCOTROL) 10 mg tablet TAKE 1 TABLET BY MOUTH TWICE DAILY 19  Yes Rancho Negrete MD   traMADol (ULTRAM) 50 mg tablet TAKE 1 TABLET BY MOUTH EVERY 6 HOURS AS NEEDED FOR PAIN FOR 30 DAYS; MAX 4 TABLETS DAILY. 5/30/19 6/29/19 Yes Jeremy Whiting MD   gabapentin (NEURONTIN) 300 mg capsule Take 2 Caps by mouth two (2) times daily as needed for Pain. 5/15/19  Yes Jose Rea MD   rosuvastatin (CRESTOR) 10 mg tablet TAKE 1 TABLET BY MOUTH DAILY 4/28/19  Yes Deepali De La Fuente MD   valACYclovir (VALTREX) 500 mg tablet Take 1 Tab by mouth two (2) times a day. 3/14/19  Yes Jeremy Whiting MD   dulaglutide (TRULICITY) 1.5 PM/1.0 mL sub-q pen 0.5 mL by SubCUTAneous route every seven (7) days. 2/7/19  Yes Cinthya Vasquez MD   amLODIPine-benazepril (LOTREL) 10-20 mg per capsule Take 1 Cap by mouth daily. 10/4/18  Yes Jose Rea MD   hydroCHLOROthiazide (HYDRODIURIL) 25 mg tablet TAKE 1 TABLET BY MOUTH DAILY 10/4/18  Yes Jose Rea MD   metFORMIN (GLUCOPHAGE) 1,000 mg tablet Take 1 Tab by mouth two (2) times daily (with meals). 10/4/18  Yes Jose Rea MD   glucose blood VI test strips (ACCU-CHEK SMARTVIEW TEST STRIP) strip Check BS once weekly 1/7/16  Yes Jeremy Whiting MD   omega-3 fatty acids-vitamin e (FISH OIL) 1,000 mg Cap Take 1 Cap by mouth daily. Yes Provider, Historical      Allergies   Allergen Reactions    Aspirin Hives     Tolerates ibuprofen and naproxen    Clindamycin Rash    Hydrocodone Nausea and Vomiting     Patient stated she is not allergic at all. Review of Systems:  A comprehensive review of systems was negative except for that written in the History of Present Illness. Objective:      Physical Exam:  GENERAL: alert, cooperative, no distress, appears stated age, EYE: negative findings: anicteric sclera, LYMPHATIC: Cervical, supraclavicular nodes normal. , THROAT & NECK: normal, LUNG: clear to auscultation bilaterally, HEART: regular rate and rhythm, ABDOMEN: Soft, NT, ND., EXTREMITIES:  no edema, SKIN: Normal., NEUROLOGIC: negative, PSYCHIATRIC: non focal    Assessment:     Soft tissue tumor of the right buttock.     Plan:     Mrs. Suraj Johnson would like to have the tumor removed soon and is fine with next week. I discussed the risks of the procedure including bleeding, infection, wound healing problems, seroma, recurrent tumor, and reaction to the prep or local and general anesthetic. She understands the risks; any and all questions were answered to her satisfaction. Mrs. Suraj Johnson will be scheduled for an elective outpatient excision of this soft tissue tumor of the right buttock under general anesthesia.

## 2019-06-25 NOTE — H&P (VIEW-ONLY)
Surgery History and Physical 
 
Subjective:  
  
Amado Allan is a 61 y.o. black female who presents for evaluation of a lipoma of the right buttock. Mrs. Krish Cruz has had a lump on her right buttock for at least 20 years. The lump has gradually increased in size and is causing some discomfort. She denies any personal or family h/o malignant soft tissue tumors. Past Medical History:  
Diagnosis Date  Diabetes ([facility] [address]   Genital herpes simplex type 2   
 GERD (gastroesophageal reflux disease)   
 no medication prescribed  Hypercholesterolemia  Hypertension  Narcolepsy  Neuropathy in diabetes ([facility] [address]  Obesity (BMI 30.0-34. 9) Past Surgical History:  
Procedure Laterality Date  HX  SECTION  03/15/1979  HX  SECTION  1990  
 HX KNEE REPLACEMENT Bilateral   HX MOHS PROCEDURES Right   HX ROTATOR CUFF REPAIR Right  ND EXTRAC ERUPTED TOOTH/EXPOSED ROOT Right 14  
 3 UPPER RIGHT SIDE BACK TEETH Family History Problem Relation Age of Onset  Diabetes Mother  Heart Disease Mother  Hypertension Mother  Arthritis-rheumatoid Mother  Diabetes Father  Heart Disease Father  Cancer Maternal Aunt   
     bone  Diabetes Sister Social History Tobacco Use  Smoking status: Never Smoker  Smokeless tobacco: Never Used Substance Use Topics  Alcohol use: No  
  Comment: rarely Prior to Admission medications Medication Sig Start Date End Date Taking? Authorizing Provider  
cyanocobalamin (VITAMIN B12) 100 mcg tablet Take 100 mcg by mouth daily. Yes Provider, Historical  
nystatin (MYCOSTATIN) topical cream Apply  to affected area two (2) times a day.  19  Yes Anup Begum MD  
glipiZIDE (GLUCOTROL) 10 mg tablet TAKE 1 TABLET BY MOUTH TWICE DAILY 19  Yes Delfina Negrete MD  
traMADol (ULTRAM) 50 mg tablet TAKE 1 TABLET BY MOUTH EVERY 6 HOURS AS NEEDED FOR PAIN FOR 30 DAYS; MAX 4 TABLETS DAILY. 5/30/19 6/29/19 Yes Yulia Borjas MD  
gabapentin (NEURONTIN) 300 mg capsule Take 2 Caps by mouth two (2) times daily as needed for Pain. 5/15/19  Yes Eric Triana MD  
rosuvastatin (CRESTOR) 10 mg tablet TAKE 1 TABLET BY MOUTH DAILY 4/28/19  Yes Marely Mcnamara MD  
valACYclovir (VALTREX) 500 mg tablet Take 1 Tab by mouth two (2) times a day. 3/14/19  Yes Yulia Borjas MD  
dulaglutide (TRULICITY) 1.5 YB/0.2 mL sub-q pen 0.5 mL by SubCUTAneous route every seven (7) days. 2/7/19  Yes Reid Vang MD  
amLODIPine-benazepril (LOTREL) 10-20 mg per capsule Take 1 Cap by mouth daily. 10/4/18  Yes Eric Triana MD  
hydroCHLOROthiazide (HYDRODIURIL) 25 mg tablet TAKE 1 TABLET BY MOUTH DAILY 10/4/18  Yes Eric Triana MD  
metFORMIN (GLUCOPHAGE) 1,000 mg tablet Take 1 Tab by mouth two (2) times daily (with meals). 10/4/18  Yes Eric Triana MD  
glucose blood VI test strips (ACCU-CHEK SMARTVIEW TEST STRIP) strip Check BS once weekly 1/7/16  Yes Yulia Borjas MD  
omega-3 fatty acids-vitamin e (FISH OIL) 1,000 mg Cap Take 1 Cap by mouth daily. Yes Provider, Historical  
  
Allergies Allergen Reactions  Aspirin Hives Tolerates ibuprofen and naproxen  Clindamycin Rash  Hydrocodone Nausea and Vomiting Patient stated she is not allergic at all. Review of Systems: A comprehensive review of systems was negative except for that written in the History of Present Illness. Objective:  
 
 Physical Exam: 
GENERAL: alert, cooperative, no distress, appears stated age, EYE: negative findings: anicteric sclera, LYMPHATIC: Cervical, supraclavicular nodes normal. , THROAT & NECK: normal, LUNG: clear to auscultation bilaterally, HEART: regular rate and rhythm, ABDOMEN: Soft, NT, ND., EXTREMITIES:  no edema, SKIN: Normal., NEUROLOGIC: negative, PSYCHIATRIC: non focal 
 
Assessment:  
 
Soft tissue tumor of the right buttock. Plan:  
 
Mrs. Swathi Lacy would like to have the tumor removed soon and is fine with next week. I discussed the risks of the procedure including bleeding, infection, wound healing problems, seroma, recurrent tumor, and reaction to the prep or local and general anesthetic. She understands the risks; any and all questions were answered to her satisfaction. Mrs. Swathi Lacy will be scheduled for an elective outpatient excision of this soft tissue tumor of the right buttock under general anesthesia.

## 2019-06-28 ENCOUNTER — TELEPHONE (OUTPATIENT)
Dept: SURGERY | Age: 64
End: 2019-06-28

## 2019-06-28 ENCOUNTER — ANESTHESIA EVENT (OUTPATIENT)
Dept: SURGERY | Age: 64
End: 2019-06-28
Payer: MEDICARE

## 2019-06-28 NOTE — TELEPHONE ENCOUNTER
Informed pt there is a letter in the mail sent on 6/25/19 and I read the letter to pt. I also informed that she will get a call from surg between 3 & 7 pm today. Pt verbalized understanding.

## 2019-07-01 ENCOUNTER — HOSPITAL ENCOUNTER (OUTPATIENT)
Age: 64
Setting detail: OUTPATIENT SURGERY
Discharge: HOME | End: 2019-07-01
Attending: SURGERY | Admitting: SURGERY
Payer: MEDICARE

## 2019-07-01 ENCOUNTER — ANESTHESIA (OUTPATIENT)
Dept: SURGERY | Age: 64
End: 2019-07-01
Payer: MEDICARE

## 2019-07-01 VITALS
DIASTOLIC BLOOD PRESSURE: 85 MMHG | RESPIRATION RATE: 18 BRPM | BODY MASS INDEX: 34.1 KG/M2 | HEART RATE: 78 BPM | SYSTOLIC BLOOD PRESSURE: 113 MMHG | HEIGHT: 63 IN | WEIGHT: 192.46 LBS | OXYGEN SATURATION: 98 % | TEMPERATURE: 97.8 F

## 2019-07-01 DIAGNOSIS — D49.2 SOFT TISSUE TUMOR: ICD-10-CM

## 2019-07-01 DIAGNOSIS — Z86.018 S/P EXCISION OF LIPOMA: Primary | ICD-10-CM

## 2019-07-01 DIAGNOSIS — Z98.890 S/P EXCISION OF LIPOMA: Primary | ICD-10-CM

## 2019-07-01 LAB
ATRIAL RATE: 78 BPM
CALCULATED P AXIS, ECG09: 50 DEGREES
CALCULATED R AXIS, ECG10: -42 DEGREES
CALCULATED T AXIS, ECG11: 14 DEGREES
DIAGNOSIS, 93000: NORMAL
GLUCOSE BLD STRIP.AUTO-MCNC: 50 MG/DL (ref 65–100)
GLUCOSE BLD STRIP.AUTO-MCNC: 53 MG/DL (ref 65–100)
GLUCOSE BLD STRIP.AUTO-MCNC: 83 MG/DL (ref 65–100)
GLUCOSE BLD STRIP.AUTO-MCNC: 92 MG/DL (ref 65–100)
P-R INTERVAL, ECG05: 138 MS
Q-T INTERVAL, ECG07: 410 MS
QRS DURATION, ECG06: 92 MS
QTC CALCULATION (BEZET), ECG08: 467 MS
SERVICE CMNT-IMP: ABNORMAL
SERVICE CMNT-IMP: ABNORMAL
SERVICE CMNT-IMP: NORMAL
SERVICE CMNT-IMP: NORMAL
VENTRICULAR RATE, ECG03: 78 BPM

## 2019-07-01 PROCEDURE — 93005 ELECTROCARDIOGRAM TRACING: CPT

## 2019-07-01 PROCEDURE — 74011000250 HC RX REV CODE- 250: Performed by: SURGERY

## 2019-07-01 PROCEDURE — 76010000138 HC OR TIME 0.5 TO 1 HR: Performed by: SURGERY

## 2019-07-01 PROCEDURE — 74011250636 HC RX REV CODE- 250/636

## 2019-07-01 PROCEDURE — 76210000016 HC OR PH I REC 1 TO 1.5 HR: Performed by: SURGERY

## 2019-07-01 PROCEDURE — 77030020782 HC GWN BAIR PAWS FLX 3M -B

## 2019-07-01 PROCEDURE — 88304 TISSUE EXAM BY PATHOLOGIST: CPT

## 2019-07-01 PROCEDURE — 74011250636 HC RX REV CODE- 250/636: Performed by: ANESTHESIOLOGY

## 2019-07-01 PROCEDURE — 77030011640 HC PAD GRND REM COVD -A: Performed by: SURGERY

## 2019-07-01 PROCEDURE — 76210000021 HC REC RM PH II 0.5 TO 1 HR: Performed by: SURGERY

## 2019-07-01 PROCEDURE — 77030013567 HC DRN WND RESERV BARD -A: Performed by: SURGERY

## 2019-07-01 PROCEDURE — 77030031139 HC SUT VCRL2 J&J -A: Performed by: SURGERY

## 2019-07-01 PROCEDURE — 76060000032 HC ANESTHESIA 0.5 TO 1 HR: Performed by: SURGERY

## 2019-07-01 PROCEDURE — 77030012407 HC DRN WND BARD -B: Performed by: SURGERY

## 2019-07-01 PROCEDURE — 82962 GLUCOSE BLOOD TEST: CPT

## 2019-07-01 PROCEDURE — 77030018836 HC SOL IRR NACL ICUM -A: Performed by: SURGERY

## 2019-07-01 PROCEDURE — 77030032490 HC SLV COMPR SCD KNE COVD -B

## 2019-07-01 PROCEDURE — 77030002933 HC SUT MCRYL J&J -A: Performed by: SURGERY

## 2019-07-01 PROCEDURE — 77030020143 HC AIRWY LARYN INTUB CGAS -A: Performed by: ANESTHESIOLOGY

## 2019-07-01 PROCEDURE — 74011250637 HC RX REV CODE- 250/637: Performed by: SURGERY

## 2019-07-01 RX ORDER — ONDANSETRON 2 MG/ML
INJECTION INTRAMUSCULAR; INTRAVENOUS AS NEEDED
Status: DISCONTINUED | OUTPATIENT
Start: 2019-07-01 | End: 2019-07-01 | Stop reason: HOSPADM

## 2019-07-01 RX ORDER — SODIUM CHLORIDE, SODIUM LACTATE, POTASSIUM CHLORIDE, CALCIUM CHLORIDE 600; 310; 30; 20 MG/100ML; MG/100ML; MG/100ML; MG/100ML
150 INJECTION, SOLUTION INTRAVENOUS CONTINUOUS
Status: DISCONTINUED | OUTPATIENT
Start: 2019-07-01 | End: 2019-07-01 | Stop reason: HOSPADM

## 2019-07-01 RX ORDER — FENTANYL CITRATE 50 UG/ML
INJECTION, SOLUTION INTRAMUSCULAR; INTRAVENOUS AS NEEDED
Status: DISCONTINUED | OUTPATIENT
Start: 2019-07-01 | End: 2019-07-01 | Stop reason: HOSPADM

## 2019-07-01 RX ORDER — LIDOCAINE HYDROCHLORIDE 10 MG/ML
0.1 INJECTION, SOLUTION EPIDURAL; INFILTRATION; INTRACAUDAL; PERINEURAL AS NEEDED
Status: DISCONTINUED | OUTPATIENT
Start: 2019-07-01 | End: 2019-07-01 | Stop reason: HOSPADM

## 2019-07-01 RX ORDER — HYDROMORPHONE HYDROCHLORIDE 1 MG/ML
0.5 INJECTION, SOLUTION INTRAMUSCULAR; INTRAVENOUS; SUBCUTANEOUS
Status: DISCONTINUED | OUTPATIENT
Start: 2019-07-01 | End: 2019-07-01 | Stop reason: HOSPADM

## 2019-07-01 RX ORDER — ALBUTEROL SULFATE 0.83 MG/ML
2.5 SOLUTION RESPIRATORY (INHALATION) AS NEEDED
Status: DISCONTINUED | OUTPATIENT
Start: 2019-07-01 | End: 2019-07-01 | Stop reason: HOSPADM

## 2019-07-01 RX ORDER — PROPOFOL 10 MG/ML
INJECTION, EMULSION INTRAVENOUS AS NEEDED
Status: DISCONTINUED | OUTPATIENT
Start: 2019-07-01 | End: 2019-07-01 | Stop reason: HOSPADM

## 2019-07-01 RX ORDER — ONDANSETRON 2 MG/ML
4 INJECTION INTRAMUSCULAR; INTRAVENOUS AS NEEDED
Status: DISCONTINUED | OUTPATIENT
Start: 2019-07-01 | End: 2019-07-01 | Stop reason: HOSPADM

## 2019-07-01 RX ORDER — LIDOCAINE HYDROCHLORIDE 20 MG/ML
INJECTION, SOLUTION EPIDURAL; INFILTRATION; INTRACAUDAL; PERINEURAL AS NEEDED
Status: DISCONTINUED | OUTPATIENT
Start: 2019-07-01 | End: 2019-07-01 | Stop reason: HOSPADM

## 2019-07-01 RX ORDER — TRAMADOL HYDROCHLORIDE 50 MG/1
50 TABLET ORAL
COMMUNITY
End: 2019-07-01

## 2019-07-01 RX ORDER — KETOROLAC TROMETHAMINE 30 MG/ML
INJECTION, SOLUTION INTRAMUSCULAR; INTRAVENOUS AS NEEDED
Status: DISCONTINUED | OUTPATIENT
Start: 2019-07-01 | End: 2019-07-01 | Stop reason: HOSPADM

## 2019-07-01 RX ORDER — DIPHENHYDRAMINE HYDROCHLORIDE 50 MG/ML
12.5 INJECTION, SOLUTION INTRAMUSCULAR; INTRAVENOUS AS NEEDED
Status: DISCONTINUED | OUTPATIENT
Start: 2019-07-01 | End: 2019-07-01 | Stop reason: HOSPADM

## 2019-07-01 RX ORDER — SODIUM CHLORIDE, SODIUM LACTATE, POTASSIUM CHLORIDE, CALCIUM CHLORIDE 600; 310; 30; 20 MG/100ML; MG/100ML; MG/100ML; MG/100ML
125 INJECTION, SOLUTION INTRAVENOUS CONTINUOUS
Status: DISCONTINUED | OUTPATIENT
Start: 2019-07-01 | End: 2019-07-01 | Stop reason: HOSPADM

## 2019-07-01 RX ORDER — HYDROCODONE BITARTRATE AND ACETAMINOPHEN 5; 325 MG/1; MG/1
1 TABLET ORAL
Qty: 6 TAB | Refills: 0 | Status: SHIPPED | OUTPATIENT
Start: 2019-07-01 | End: 2019-07-04

## 2019-07-01 RX ORDER — HYDROCODONE BITARTRATE AND ACETAMINOPHEN 5; 325 MG/1; MG/1
1 TABLET ORAL ONCE
Status: COMPLETED | OUTPATIENT
Start: 2019-07-01 | End: 2019-07-01

## 2019-07-01 RX ORDER — BUPIVACAINE HYDROCHLORIDE 5 MG/ML
INJECTION, SOLUTION EPIDURAL; INTRACAUDAL AS NEEDED
Status: DISCONTINUED | OUTPATIENT
Start: 2019-07-01 | End: 2019-07-01 | Stop reason: HOSPADM

## 2019-07-01 RX ADMIN — FENTANYL CITRATE 25 MCG: 50 INJECTION, SOLUTION INTRAMUSCULAR; INTRAVENOUS at 12:44

## 2019-07-01 RX ADMIN — HYDROMORPHONE HYDROCHLORIDE 0.5 MG: 1 INJECTION, SOLUTION INTRAMUSCULAR; INTRAVENOUS; SUBCUTANEOUS at 13:37

## 2019-07-01 RX ADMIN — ONDANSETRON 4 MG: 2 INJECTION INTRAMUSCULAR; INTRAVENOUS at 12:39

## 2019-07-01 RX ADMIN — HYDROCODONE BITARTRATE AND ACETAMINOPHEN 1 TABLET: 5; 325 TABLET ORAL at 14:10

## 2019-07-01 RX ADMIN — FENTANYL CITRATE 50 MCG: 50 INJECTION, SOLUTION INTRAMUSCULAR; INTRAVENOUS at 12:30

## 2019-07-01 RX ADMIN — LIDOCAINE HYDROCHLORIDE 100 MG: 20 INJECTION, SOLUTION EPIDURAL; INFILTRATION; INTRACAUDAL; PERINEURAL at 12:30

## 2019-07-01 RX ADMIN — FENTANYL CITRATE 25 MCG: 50 INJECTION, SOLUTION INTRAMUSCULAR; INTRAVENOUS at 12:57

## 2019-07-01 RX ADMIN — PROPOFOL 150 MG: 10 INJECTION, EMULSION INTRAVENOUS at 12:30

## 2019-07-01 RX ADMIN — SODIUM CHLORIDE, POTASSIUM CHLORIDE, SODIUM LACTATE AND CALCIUM CHLORIDE: 600; 310; 30; 20 INJECTION, SOLUTION INTRAVENOUS at 12:10

## 2019-07-01 RX ADMIN — FENTANYL CITRATE 25 MCG: 50 INJECTION, SOLUTION INTRAMUSCULAR; INTRAVENOUS at 12:52

## 2019-07-01 RX ADMIN — FENTANYL CITRATE 25 MCG: 50 INJECTION, SOLUTION INTRAMUSCULAR; INTRAVENOUS at 12:39

## 2019-07-01 RX ADMIN — KETOROLAC TROMETHAMINE 30 MG: 30 INJECTION, SOLUTION INTRAMUSCULAR; INTRAVENOUS at 13:10

## 2019-07-01 NOTE — PERIOP NOTES
Education instructions given to patient and son, son demonstrated George Ortega and documentation on discharge instruction sheet. Patient awake, alert, and oriented. Starks Challenger discharged via wheelchair to care without difficulty.

## 2019-07-01 NOTE — OP NOTES
Operative Report        Ed Lux    MRN:  [id number]    Date of Procedure:  7/1/2019    Surgeon:  Akhil Garcia MD.     Assistant: Billie Sullivan. Anesthesia:   1. General with LMA. 2. 0.5% Marcaine. Preoperative Diagnosis:   SOFT TISSUE TUMOR OF THE RIGHT BUTTOCK. Postoperative Diagnosis:    SOFT TISSUE TUMOR OF THE RIGHT BUTTOCK. 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 Indication:   Ed Lux is a 61 yrs black female who presents with a soft tissue tumor of the right buttock which she would like to have removed. Procedure in Detail:   The patient was seen preoperatively in the holding area. The risks, benefits, and expected outcome were discussed with the patient, and all questions were answered satisfactorily. The patient concurred with the proposed plan, giving informed consent. The tumor was identified by the patient and confirmed by me. The tumor was then marked. The patient was taken to the OR. The patient was identified as Ed Lux, and the procedure verified as Procedure(s):  EXCISION OF SOFT TISSUE TUMOR OF THE RIGHT BUTTOCK. The patient was placed on the OR table in the left lateral decubitus position. General anesthesia was administered and tolerated well. The patient's right buttock was prepped with Chloraprep and draped in the usual sterile fashion. A Time Out was performed, and the above information was confirmed. The tumor was palpated and remarked. The local anesthetic was infiltrated into the skin and subcutaneous tissue. A transversed incision was made directly over the most prominent portion of the tumor. Dissection was taken down into the subcutaneous tissue with cautery, and a fatty tumor was discovered. The tumor was dissected out in its entirety with blunt dissection and cautery and passed off as a specimen. No other masses were palpated within the wound. Hemostasis was obtained within the wound as needed with cautery. The wound was irrigated with saline. A 15 FR drain was placed into the wound and exited inferior and to the right. The drain was secured with a 2-0 Nylon. The subcutaneous tissue was approximated with interrupted 3-0 Vicryl. The skin was closed with 4-0 Monocryl in the usual running subcuticular fashion. The wound was cleaned and dried, and steri-strips and a sterile dressing were applied. The drain was placed to bulb suction. The patient was flipped to the supine position. The patient was extubated in the room. Estimated Blood Loss:    Less than 25 ml. Specimen:   ID Type Source Tests Collected by Time Destination   1 : right buttock. soft tissue tumor Preservative Buttock  Denise Tian MD 7/1/2019 1300 Pathology        Drain:   A 15 FR JELANI drain was placed into the right buttock. Findings: An approximately 12 x 8 cm subcutaneous fatty tumor in the right buttock. Counts: All sponge, needle, and instrument counts were correct x 2. Complications:  None. Disposition:  The patient was transferred to the recovery room in stable condition, having tolerated the procedure and anesthesia well.                Signed By: Tyrone Goncalves MD     July 1, 2019        Radha Mora MD

## 2019-07-01 NOTE — ANESTHESIA PREPROCEDURE EVALUATION
Relevant Problems   No relevant active problems       Anesthetic History   No history of anesthetic complications            Review of Systems / Medical History  Patient summary reviewed and pertinent labs reviewed    Pulmonary  Within defined limits                 Neuro/Psych   Within defined limits           Cardiovascular    Hypertension: well controlled          Hyperlipidemia    Exercise tolerance: >4 METS     GI/Hepatic/Renal     GERD (food related)           Endo/Other    Diabetes: type 2         Other Findings   Comments: Neuropathy bilateral feet    Narcolepsy          Physical Exam    Airway  Mallampati: III  TM Distance: 4 - 6 cm  Neck ROM: normal range of motion   Mouth opening: Normal     Cardiovascular    Rhythm: regular  Rate: normal         Dental         Pulmonary  Breath sounds clear to auscultation               Abdominal         Other Findings            Anesthetic Plan    ASA: 3  Anesthesia type: general          Induction: Intravenous  Anesthetic plan and risks discussed with: Patient

## 2019-07-01 NOTE — DISCHARGE INSTRUCTIONS
Instructions Following Excision of Cyst, Soft Tissue Mass    Activity:  · No quick kicking movement with your leg! · May shower tomorrow. No bath for 2 weeks and until after seen by your doctor. Diet:  · Advance to a regular diet as tolerated. Pain:  · Take pain medication as directed by your doctor. · Call your doctor if pain is NOT relieved by medication. · DO NOT take blood thinners until directed by your doctor. Dressing Care:   Remove your bandage on Wednesday, 7/3. You may leave your incision uncovered. Leave the steri-strips in place. If they start to peel off, then you may remove them. Keep the incision clean and dry. If there is any drainage, then cover the incision with a dry bandage. Change the bandage daily as needed. Empty and record the drain as needed. Bring the record with you on your first office visit! Leave a bandage around the drain site. Follow-Up Phone Calls:  · Call will be made by my nursing staff tomorrow. · If you have any problems or concerns, call your doctor as needed. Call your doctor if you experience:  · Excessive bleeding that does not stop after holding mild pressure over the area. · Temperature of 101° Fahrenheit or above. · Redness, excessive swelling or bruising, and/or green or yellow, smelly discharge from incision. After Anesthesia:  · For the first 24 hours and while taking narcotic pain medication: DO NOT drive, drink alcoholic beverages, or make important decisions. · Be aware of dizziness following anesthesia and while taking pain medication. Aayush Medina. Anahi Ignacio MD, FACS  General Surgery at [address] [place] [address], [facility] [address]  [phone]  Fax [phone]           AN[address] (J-P) [address]    1. Strip the tubing 3 times a day (more often if there are a lot of blood clots). a. Wash hands.   b. Grasp the tubing close to the exit point/dressing with one hand and stabilize it.  c. With your other hand grasp the tubing next to your stabilizing hand. d. Using an alcohol pad (or lotion), pinching the tubing tightly, slide your fingers down the tubing away from the body to expel contents of the tubing into the bulb; repeat this 2 or 3 times. It is okay if the tubing becomes flat from the suction. 2. Empty the bulb into a small measuring container 3 times a day or whenever the bulb is full or the bulb feels heavy. a. Wash Hands. b. Open the small plug on the top of the bulb and pour the drainage into the measuring container. c. Squeeze the bulb and hold while replacing the plug. The bulb must be collapsed for the suction and drain to work.  d. Arthor Post can pin the tag of the bulb to your clothing so the weight of the bulb does not pull on the insertion site. 3. Measure the drainage and record the amount each time that you empty it.  a. Hold the measuring container at eye level to read the numbers on the side. b. Read the numbers in the ml column and record the time and amount. c. Wash hands. To empty and measure                         To prime and resume suction        Showering/Dressing Change          (If you are allowed to change the dressing and/or shower)  1. Apply a clean 2x2 or 4x4 gauze around the insertion site after the shower. You may need to change it more often if it becomes heavily soiled. Call if there is more than a 1 inch area of drainage on your dressing. 2. Use the table below to keep a record. DATE TIME DRAINAGE AMOUNT                                                                                           DISCHARGE SUMMARY from your Nurse    The following personal items collected during your admission are returned to you:   Dental Appliance: Dental Appliances: None  Vision: Visual Aid: None  Hearing Aid:    Jewelry: Jewelry: None  Clothing: Clothing:  Footwear, Dress, Undergarments  Other Valuables: Other Valuables: None  Valuables sent to safe:      PATIENT INSTRUCTIONS:    After general anesthesia or intravenous sedation, for 24 hours or while taking prescription Narcotics:  · Limit your activities  · Do not drive and operate hazardous machinery  · Do not make important personal or business decisions  · Do  not drink alcoholic beverages  · If you have not urinated within 8 hours after discharge, please contact your surgeon on call. Report the following to your surgeon:  · Excessive pain, swelling, redness or odor of or around the surgical area  · Temperature over 100.5  · Nausea and vomiting lasting longer than 4 hours or if unable to take medications  · Any signs of decreased circulation or nerve impairment to extremity: change in color, persistent  numbness, tingling, coldness or increase pain  · Any questions    COUGH AND DEEP BREATHE    Breathing deep and coughing are very important exercises to do after surgery. Deep breathing and coughing open the little air tubes and air sacks in your lungs. You take deep breaths every day. You may not even notice - it is just something you do when you sigh or yawn. It is a natural exercise you do to keep these air passages open. After surgery, take deep breaths and cough, on purpose. Coughing and deep breathing help prevent bronchitis and pneumonia after surgery. If you had chest or belly surgery, use a pillow as a \"hug buddy\" and hold it tightly to your chest or belly when you cough. DIRECTIONS:  6. Take 10 to 15 slow deep breaths every hour while awake. 7. Breathe in deeply, and hold it for 2 seconds. 8. Exhale slowly through puckered lips, like blowing up a balloon. 9. After every 4th or 5th deep breath, hug your pillow to your chest or belly and give a hard, deep cough. Yes, it will probably hurt. But doing this exercise is very important part of healing after surgery.   Take your pain medicine to help you do this exercise without too much pain. IF YOU HAVE BEEN DIAGNOSED WITH SLEEP APNEA, PLEASE USE YOUR SLEEP APNEA DEVICE OR CPAP MACHINE WHEN YOU INTEND TO NAP AFTER TAKING PAIN MEDICATION. Ankle Pumps    Ankle pumps increase the circulation of oxygenated blood to your lower extremities and decrease your risk for circulation problems such as blood clots. They also stretch the muscles, tendons and ligaments in your foot and ankle, and prevent joint contracture in the ankle and foot, especially after surgeries on the legs. It is important to do ankle pump exercises regularly after surgery because immobility increases your risk for developing a blood clot. Your doctor may also have you take an Aspirin for the next few days as well. If your doctor did not ask you to take an Aspirin, consult with him before starting Aspirin therapy on your own. Slowly point your foot forward, feeling the muscles on the top of your lower leg stretch, and hold this position for 5 seconds. Next, pull your foot back toward you as far as possible, stretching the calf muscles, and hold that position for 5 seconds. Repeat with the other foot. Perform 10 repetitions every hour while awake for both ankles if possible (down and then up with the foot once is one repetition). You should feel gentle stretching of the muscles in your lower leg when doing this exercise. If you feel pain, or your range of motion is limited, don't  Push too hard. Only go the limit your joint and muscles will let you go. If you have increasing pain, progressively worsening leg warmth or swelling, STOP the exercise and call your doctor.      Below is information about the medications your doctor is prescribing after your visit:    Other information in your discharge envelope:  []     PRESCRIPTIONS  []     PHYSICAL THERAPY PRESCRIPTION  []     APPOINTMENT CARDS  []     Regional Anesthesia Pamphlet for block or block with On-Q Catheter from Anesthesia Service  []     Medical device information sheets/pamphlets from their    []     School/work excuse note. []     /parent work excuse note. These are general instructions for a healthy lifestyle:    *  Please give a list of your current medications to your Primary Care Provider. *  Please update this list whenever your medications are discontinued, doses are      changed, or new medications (including over-the-counter products) are added. *  Please carry medication information at all times in case of emergency situations. About Smoking  No smoking / No tobacco products / Avoid exposure to second hand smoke    Surgeon General's Warning:  Quitting smoking now greatly reduces serious risk to your health. Obesity, smoking, and sedentary lifestyle greatly increases your risk for illness and disease. A healthy diet, regular physical exercise & weight monitoring are important for maintaining a healthy lifestyle. Congestive Heart Failure  You may be retaining fluid if you have a history of heart failure or if you experience any of the following symptoms:  Weight gain of 3 pounds or more overnight or 5 pounds in a week, increased swelling in our hands or feet or shortness of breath while lying flat in bed. Please call your doctor as soon as you notice any of these symptoms; do not wait until your next office visit. Recognize signs and symptoms of STROKE:  F - face looks uneven  A - arms unable to move or move even  S - speech slurred or non-existent  T - time-call 911 as soon as signs and symptoms begin-DO NOT go         Back to bed or wait to see if you get better-TIME IS BRAIN. Warning signs of HEART ATTACK  Call 911 if you have these symptoms    · Chest discomfort. Most heart attacks involve discomfort in the center of the chest that lasts more than a few minutes, or that goes away and comes back.   It can feel like uncomfortable pressure, squeezing, fullness, or pain. · Discomfort in other areas of the upper body. Symptoms can include pain or discomfort in one or both        Arms, the back, neck, jaw, or stomach. ·  Shortness of breath with or without chest discomfort. · Other signs may include breaking out in a cold sweat, nausea, or lightheadedness    Don't wait more than five minutes to call 911 - MINUTES MATTER! Fast action can save your life. Calling 911 is almost always the fastest way to get lifesaving treatment. Emergency Medical Services staff can begin treatment when they arrive - up to an hour sooner than if someone gets to the hospital by car. [facility] MEDICATION AND SIDE EFFECT GUIDE    The [employer] MEDICATION AND SIDE EFFECT GUIDE was provided to the PATIENT AND CARE PROVIDER.   Information provided includes instruction about drug purpose and common side effects for the following medications:    · [facility]

## 2019-07-01 NOTE — INTERVAL H&P NOTE
H&P Update:  Anastacia Polk was seen and examined. History and physical has been reviewed. The patient has been examined.  There have been no significant clinical changes since the completion of the originally dated History and Physical.

## 2019-07-01 NOTE — ANESTHESIA POSTPROCEDURE EVALUATION
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    general    Anesthesia Post Evaluation      Multimodal analgesia: multimodal analgesia used between 6 hours prior to anesthesia start to PACU discharge  Patient location during evaluation: bedside  Patient participation: complete - patient participated  Level of consciousness: awake  Pain management: adequate  Airway patency: patent  Anesthetic complications: no  Cardiovascular status: acceptable  Respiratory status: acceptable  Hydration status: acceptable        Vitals Value Taken Time   /80 7/1/2019  2:30 PM   Temp 36.6 °C (97.8 °F) 7/1/2019  2:05 PM   Pulse 70 7/1/2019  2:33 PM   Resp 6 7/1/2019  2:33 PM   SpO2 97 % 7/1/2019  2:33 PM   Vitals shown include unvalidated device data.

## 2019-07-02 ENCOUNTER — TELEPHONE (OUTPATIENT)
Dept: SURGERY | Age: 64
End: 2019-07-02

## 2019-07-02 NOTE — TELEPHONE ENCOUNTER
Called pt to follow up after surgery. Patient identified with three patient identifiers. How are you doing:ok  Are you having any pain:no  Are you taking pain meds:yes, taking MOM       If yes- recommended any OTC constipation treatment if needed  Have you had any nausea or vomiting:no  How is your appetite (eating & drinking):good  Normal BM & urine output:urine but no BM yet  Pt notified to take dressing off after 48 hrs: has drain  Do they have a drain:yes  Are they keeping track of output:yes  Did they review their discharge instructions:yes  Any other concerns:no  Your follow up office appt is:  7/17/2019 2:30 PM Karely Holman MD       Pt did not voice any other concerns. Pt will call with any problems or questions.

## 2019-07-03 ENCOUNTER — OFFICE VISIT (OUTPATIENT)
Dept: ENDOCRINOLOGY | Age: 64
End: 2019-07-03

## 2019-07-03 ENCOUNTER — OFFICE VISIT (OUTPATIENT)
Dept: FAMILY MEDICINE CLINIC | Age: 64
End: 2019-07-03

## 2019-07-03 VITALS
WEIGHT: 192 LBS | OXYGEN SATURATION: 97 % | DIASTOLIC BLOOD PRESSURE: 83 MMHG | BODY MASS INDEX: 34.02 KG/M2 | TEMPERATURE: 98.7 F | HEIGHT: 63 IN | HEART RATE: 97 BPM | SYSTOLIC BLOOD PRESSURE: 134 MMHG | RESPIRATION RATE: 18 BRPM

## 2019-07-03 VITALS
WEIGHT: 192 LBS | DIASTOLIC BLOOD PRESSURE: 75 MMHG | RESPIRATION RATE: 16 BRPM | HEART RATE: 105 BPM | TEMPERATURE: 99.4 F | SYSTOLIC BLOOD PRESSURE: 127 MMHG | OXYGEN SATURATION: 95 % | BODY MASS INDEX: 34.02 KG/M2 | HEIGHT: 63 IN

## 2019-07-03 DIAGNOSIS — I10 ESSENTIAL HYPERTENSION: ICD-10-CM

## 2019-07-03 DIAGNOSIS — M19.011 ARTHRITIS OF RIGHT GLENOHUMERAL JOINT: ICD-10-CM

## 2019-07-03 DIAGNOSIS — M79.18 MYOFASCIAL PAIN: ICD-10-CM

## 2019-07-03 DIAGNOSIS — E78.00 HYPERCHOLESTEROLEMIA: ICD-10-CM

## 2019-07-03 DIAGNOSIS — E11.21 TYPE 2 DIABETES MELLITUS WITH DIABETIC NEPHROPATHY, WITHOUT LONG-TERM CURRENT USE OF INSULIN (HCC): Primary | ICD-10-CM

## 2019-07-03 DIAGNOSIS — M46.1 BILATERAL SACROILIITIS (HCC): Primary | ICD-10-CM

## 2019-07-03 PROBLEM — B02.9 SHINGLES RASH: Status: ACTIVE | Noted: 2019-07-03

## 2019-07-03 RX ORDER — TRIAMCINOLONE ACETONIDE 40 MG/ML
40 INJECTION, SUSPENSION INTRA-ARTICULAR; INTRAMUSCULAR ONCE
Qty: 1 ML | Refills: 0
Start: 2019-07-03 | End: 2019-07-03

## 2019-07-03 RX ORDER — LIDOCAINE HYDROCHLORIDE 10 MG/ML
2 INJECTION INFILTRATION; PERINEURAL ONCE
Qty: 2 ML | Refills: 0
Start: 2019-07-03 | End: 2019-07-03

## 2019-07-03 RX ORDER — TRIAMCINOLONE ACETONIDE 40 MG/ML
40 INJECTION, SUSPENSION INTRA-ARTICULAR; INTRAMUSCULAR ONCE
Qty: 1 ML | Refills: 0
Start: 2019-07-03 | End: 2019-07-03 | Stop reason: SDUPTHER

## 2019-07-03 RX ORDER — LIDOCAINE HYDROCHLORIDE 10 MG/ML
2 INJECTION INFILTRATION; PERINEURAL ONCE
Qty: 2 ML | Refills: 0
Start: 2019-07-03 | End: 2019-07-03 | Stop reason: SDUPTHER

## 2019-07-03 RX ORDER — LIDOCAINE HYDROCHLORIDE 10 MG/ML
3 INJECTION INFILTRATION; PERINEURAL ONCE
Qty: 3 ML | Refills: 0
Start: 2019-07-03 | End: 2019-07-03

## 2019-07-03 NOTE — PROGRESS NOTES
History of Present Illness     Patient Identification  Eric Ariza is a 61 y.o. female here today for follow-up of bilateral lower back pain and right shoulder pain. She has been involved in physical therapy and states her improvement may have plateaued. Inquiring about injections today. Her shoulder pain is worse at night. Back pain is worse with bending and rising up. No radiating symptoms upper extremities and lower extremities. Past Medical History:   Diagnosis Date    [facility]    Genital herpes simplex type 2     GERD (gastroesophageal reflux disease)     no medication prescribed    Hypercholesterolemia     Hypertension     Narcolepsy     Neuropathy in diabetes ([facility]     Obesity (BMI 30.0-34. 5)      Family History   Problem Relation Age of Onset    Diabetes Mother     Heart Disease Mother     Hypertension Mother    Patric  Arthritis-rheumatoid Mother     Diabetes Father     Heart Disease Father     Cancer Maternal Aunt         bone    Diabetes Sister      Current Outpatient Medications   Medication Sig Dispense Refill    triamcinolone acetonide (KENALOG) 40 mg/mL injection 1 mL by IntraBURSal route once for 1 dose. 1 mL 0    lidocaine (XYLOCAINE) 10 mg/mL (1 %) injection 3 mL by IntraBURSal route once for 1 dose. 3 mL 0    triamcinolone acetonide (KENALOG) 40 mg/mL injection 1 mL by IntraBURSal route once for 1 dose. 1 mL 0    lidocaine (XYLOCAINE) 10 mg/mL (1 %) injection 2 mL by IntraBURSal route once for 1 dose. 2 mL 0    triamcinolone acetonide (KENALOG) 40 mg/mL injection 1 mL by IntraBURSal route once for 1 dose. 1 mL 0    lidocaine (XYLOCAINE) 10 mg/mL (1 %) injection 2 mL by IntraBURSal route once for 1 dose. 2 mL 0    HYDROcodone-acetaminophen (NORCO) 5-325 mg per tablet Take 1 Tab by mouth every four (4) hours as needed for Pain for up to 3 days. Max Daily Amount: 6 Tabs.  6 Tab 0    peg 400-propylene glycol (SYSTANE, PROPYLENE GLYCOL,) 0.4-0.3 % drop Administer 1 Drop to both eyes as needed.  cyanocobalamin (VITAMIN B12) 100 mcg tablet Take 100 mcg by mouth daily.  nystatin (MYCOSTATIN) topical cream Apply  to affected area two (2) times a day. 60 g 1    glipiZIDE (GLUCOTROL) 10 mg tablet TAKE 1 TABLET BY MOUTH TWICE DAILY 180 Tab 3    gabapentin (NEURONTIN) 300 mg capsule Take 2 Caps by mouth two (2) times daily as needed for Pain. 180 Cap 0    rosuvastatin (CRESTOR) 10 mg tablet TAKE 1 TABLET BY MOUTH DAILY 90 Tab 0    dulaglutide (TRULICITY) 1.5 PD/8.5 mL sub-q pen 0.5 mL by SubCUTAneous route every seven (7) days. 12 Syringe 3    amLODIPine-benazepril (LOTREL) 10-20 mg per capsule Take 1 Cap by mouth daily. 90 Cap 3    hydroCHLOROthiazide (HYDRODIURIL) 25 mg tablet TAKE 1 TABLET BY MOUTH DAILY 90 Tab 3    metFORMIN (GLUCOPHAGE) 1,000 mg tablet Take 1 Tab by mouth two (2) times daily (with meals). 180 Tab 3    omega-3 fatty acids-vitamin e (FISH OIL) 1,000 mg Cap Take 1 Cap by mouth daily. Allergies   Allergen Reactions    Aspirin Hives     Tolerates ibuprofen and naproxen    Clindamycin Rash and Hives       Review of Systems  A comprehensive review of systems was negative except for that written in the HPI. Physical Exam     Visit Vitals  /83   Pulse 97   Temp 98.7 °F (37.1 °C) (Oral)   Resp 18   Ht 5' 3\" (1.6 m)   Wt 192 lb (87.1 kg)   LMP 01/01/2008 (LMP Unknown)   SpO2 97%   BMI 34.01 kg/m²       GEN: Well appearing. No apparent distress. Responds to all questions appropriately. Lungs: No labored respirations. Talking in  complete sentences without difficulty.     Shoulder: Right  Deformity: None    ROM:  ER at 90 degrees abduction: Active: 80 Passive:80  IR at 90 degrees abduction: Active: 60 Passive:80    Palpation:  AC tenderness: None  SC tenderness: None  Clavicle tenderness: None  Biceps tenderness: None    Strength:  Empty Can(Supraspinatus): Left:5/5 Right:5/5  External rotation(Infraspinatus): Left:5/5 Right: 5/5    Rotator Cuff Exam:  Neers sign: Positive  Cruz sign: Positive    General: Alert and oriented and in no acute distress. Responds to all questions appropriately  LUNGS: Respirations unlabored  Skin: No obvious rash    MSK:    Posture: Normal   Deformity: None    ROM:     Lumbar Flexion: Normal but painful    Lumbar Extension: Normal but painful     Gait: Normal       Palpation:    L1-L5: No Tenderness    Sacrum: No Tenderness    Coccyx: No Tenderness    Paraspinal:   Left: Tenderness Right: Tenderness    Sacroiliac Joint:  Left: Tenderness Right: Tenderness    Piriformis Muscle:  Left: No Tenderness Right: No Tenderness    Greater Trochanter:   Left: No Tenderness Right: No Tenderness    Ischial Tuberosity:  Left: No Tenderness Right: No Tenderness                 [facility]  OFFICE PROCEDURE PROGRESS NOTE        Chart reviewed for the following:   Slim BRODERICK MD, have reviewed the History, Physical and updated the Allergic reactions for Paul Mariscal     TIME OUT performed immediately prior to start of procedure:   Slim BRODERICK MD, have performed the following reviews on Doug Marie prior to the start of the procedure:            * Patient was identified by name and date of birth   * Agreement on procedure being performed was verified  * Risks and Benefits explained to the patient  * Procedure site verified and marked as necessary  * Patient was positioned for comfort  * Consent was signed and verified     Time: 1:10pm      Date of procedure: 7/3/2019    Procedure performed by:  Slim Pike MD    Provider assisted by:  Jhon Ramirez LPN    Patient assisted by: self    How tolerated by patient: tolerated the procedure well with no complications    Post Procedural Pain Scale: 0 - No Hurt    Ultrasound Guided Left and Right Sacroiliac Joints Injection    Treatment options discussed with patient at length, including risks, benefits, alternatives, and the nature of any potential procedures for the problem. Using the [address] system, I performed a MSK US guided  injection of the above target via an in-plane approach after Chlorhexidine prep and needle localization with the curved Probe using a 22G needle injecting 40 mg Kenalog and 3 ml Lidocaine 1% w/o Epi. Pt reported 80 percent relief of symptoms after injection. The injection was performed for diagnostic and prognostic purposes. Ultrasound Guided Right Glenohumeral Joint Injection    Treatment options discussed with patient at length, including risks, benefits, alternatives, and the nature of any potential procedures for the problem. Using the [address] system, I performed a MSK US guided aspiration and/or injection of the above target via an in-plane approach after Chlorhexidine prep and needle localization with the curved Probe using a 22G needle injecting 40 mg Kenalog and 3 ml Lidocaine 1% w/o Epi. Pt reported 100 percent relief of symptoms after injection. The injection was performed for diagnostic and prognostic purposes. Trigger Point Injections     Preparation - Cleaned and prepped with chlorhexidine swab x3. Anesthesia - Ethyl chloride spray used to anesthitise the skin prior to injection. Description of procedure - 4 trigger points were identified in the bilateral lower lumbar/paraspinal regions and each site was injected with 0.5 ml of 1% Lidocaine and 0.5ml of D5 as a  (50:50) mixture. Patient tolerated the procedure well and there were no complications. Patient reports 100% pain relief following the injections. No results found for any visits on 07/03/19.         Assessment:    ICD-10-CM ICD-9-CM    1. Bilateral sacroiliitis (HCC) M46.1 720.2 TRIAMCINOLONE ACETONIDE INJ      triamcinolone acetonide (KENALOG) 40 mg/mL injection      lidocaine (XYLOCAINE) 10 mg/mL (1 %) injection      TRIAMCINOLONE ACETONIDE INJ      triamcinolone acetonide (KENALOG) 40 mg/mL injection      lidocaine (XYLOCAINE) 10 mg/mL (1 %) injection      AMB POS US DRAIN/INJECT LARGE JOINT/BURSA   2. Arthritis of right glenohumeral joint M19.011 716.91 TRIAMCINOLONE ACETONIDE INJ      triamcinolone acetonide (KENALOG) 40 mg/mL injection      lidocaine (XYLOCAINE) 10 mg/mL (1 %) injection      AMB POS US DRAIN/INJECT LARGE JOINT/BURSA   3. Myofascial pain M79.18 729.1 INJECT TRIGGER POINTS, > 3       Plan:  1. Patient much improved after injections. Informed to shower and update for the next few days. Continue with physical therapy next week. We will see how long these injections last.  Follow-up in 6 weeks if no improvement or worsening. If she is better she does not need to follow-up. If her pain persists can consider a TCA to see if this improves her pain. After Care Instructions: The patient is asked to continue to rest the joint for a few more days before resuming regular activities. It may be more painful for the first 1-2 days. Watch for fever, or increased swelling or persistent pain in the joint. Call or return to clinic prn if such symptoms occur or there is failure to improve as anticipated. Patient encounter was at least 25 minutes with more than 50 percent of the visit involved in counseling      Follow-up and Dispositions    · Return in about 6 weeks (around 8/14/2019).

## 2019-07-03 NOTE — PROGRESS NOTES
Caitlin Hardy is a 61 y.o. female here for   Chief Complaint   Patient presents with    Diabetes       Functional glucose monitor and record keeping system? -yes   Eye exam within last year? - on file  Foot exam within last year? - on file    1. Have you been to the ER, urgent care clinic since your last visit? Hospitalized since your last visit? -[facility] 7/1/19 excision of tissue tumor of R buttock    2. Have you seen or consulted any other health care providers outside of the [address] [place] [address] since your last visit?   Include any pap smears or colon screening.-no

## 2019-07-03 NOTE — LETTER
7/5/19 Patient: Eric Crabtree YOB: 1955 Date of Visit: 7/3/2019 [address] [facility] [address] [address] VIA In Basket Dear Gustavo Mccarthy MD, Thank you for referring Ms. Sary Birmingham to [id number] [place] [address] for evaluation. My notes for this consultation are attached. If you have questions, please do not hesitate to call me. I look forward to following your patient along with you. Sincerely, Cris Odom MD
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
## 2019-07-03 NOTE — PATIENT INSTRUCTIONS
Learning About Joint Injections  What are joint injections? Joint injections are shots into a joint, such as the knee or shoulder. They are used to put in medicines, such as pain relievers and steroid medicines. Steroids can be injected directly into a swollen and painful joint to reduce inflammation. A steroid shot can sometimes help with short-term pain relief when other treatments haven't worked. If steroid shots help, pain may improve for weeks or months. How are they done? First, the area over the joint will be cleaned. Your doctor may then use a tiny needle to numb the skin in the area where you will get the joint injection. If a tiny needle is used to numb the area, your doctor will use another needle to inject the medicine. Your doctor may use a pain reliever, a steroid, or both. You may feel some pressure or discomfort. The procedure takes 10 to 30 minutes. But the injection itself usually takes only a few minutes. Your doctor may put ice on the area before you go home. You will probably go home soon after your shot. What can you expect after a joint injection? You may have numbness around the joint for a few hours. If your shot included both a pain reliever and a steroid, then the pain will probably go away right away. But it might come back after a few hours. This might happen if the pain reliever wears off and the steroid hasn't started to work yet. Steroids don't always work. But when they do, the pain relief can last for several days to a few months or longer. Your doctor may tell you to use ice on the area. You can also use ice if the pain comes back. Put ice or a cold pack on your joint for 10 to 20 minutes at a time. Put a thin cloth between the ice and your skin. Follow your doctor's instructions carefully. Follow-up care is a key part of your treatment and safety. Be sure to make and go to all appointments, and call your doctor if you are having problems.  It's also a good idea to know your test results and keep a list of the medicines you take. Where can you learn more? Go to http://yesenia-anshu.info/. Enter F538 in the search box to learn more about \"Learning About Joint Injections. \"  Current as of: September 20, 2018  Content Version: 11.9  © 4074-7568 [employer]. Care instructions adapted under license by [employer] (which disclaims liability or warranty for this information). If you have questions about a medical condition or this instruction, always ask your healthcare professional. [address] any warranty or liability for your use of this information.

## 2019-07-03 NOTE — PROGRESS NOTES
Chief Complaint   Patient presents with    Back Pain     follow up on lower back pain .  Shoulder Pain      follow up right shoulder pain      Blood pressure 134/83, pulse 97, temperature 98.7 °F (37.1 °C), temperature source Oral, resp. rate 18, height 5' 3\" (1.6 m), weight 192 lb (87.1 kg), last menstrual period 01/01/2008, SpO2 97 %. 1. Have you been to the ER, urgent care clinic since your last visit? Hospitalized since your last visit? Yes When: 7/1/19 Where: [facility]     2. Have you seen or consulted any other health care providers outside of the [address] [place] [address] since your last visit? Include any pap smears or colon screening.  No

## 2019-07-17 ENCOUNTER — OFFICE VISIT (OUTPATIENT)
Dept: SURGERY | Age: 64
End: 2019-07-17

## 2019-07-17 VITALS
WEIGHT: 192 LBS | OXYGEN SATURATION: 96 % | HEART RATE: 92 BPM | HEIGHT: 63 IN | DIASTOLIC BLOOD PRESSURE: 66 MMHG | SYSTOLIC BLOOD PRESSURE: 123 MMHG | RESPIRATION RATE: 16 BRPM | BODY MASS INDEX: 34.02 KG/M2 | TEMPERATURE: 98.2 F

## 2019-07-17 DIAGNOSIS — Z98.890 S/P EXCISION OF LIPOMA: ICD-10-CM

## 2019-07-17 DIAGNOSIS — Z86.018 S/P EXCISION OF LIPOMA: ICD-10-CM

## 2019-07-17 PROBLEM — D49.2 SOFT TISSUE TUMOR: Status: RESOLVED | Noted: 2019-06-25 | Resolved: 2019-07-17

## 2019-07-17 NOTE — PROGRESS NOTES
1. Have you been to the ER, urgent care clinic since your last visit? Hospitalized since your last visit? No    2. Have you seen or consulted any other health care providers outside of the [address] [place] [address] since your last visit? Include any pap smears or colon screening.  No

## 2019-07-17 NOTE — PROGRESS NOTES
Subjective:      Allison Burgess is a 61 y.o. black female presents for postop care 2 weeks following an excision of a lipoma. Mrs. Surjit Horne is doing fine. Her drain output is minimal.    Objective:     Visit Vitals  /66 (BP 1 Location: Left arm, BP Patient Position: Sitting)   Pulse 92   Temp 98.2 °F (36.8 °C) (Oral)   Resp 16   Ht 5' 3\" (1.6 m)   Wt 192 lb (87.1 kg)   LMP 01/01/2008 (LMP Unknown)   SpO2 96%   BMI 34.01 kg/m²       General:  alert, cooperative, no distress   Right buttock: The incision is clean, dry, and intact with no erythema. The drain fluid is serous. Assessment:     1st POV, s/p excision of lipoma of the right buttock. Plan:     The pathology report was discussed with Mrs. Surjit Horne. The drain was removed today. She can f/u prn.

## 2019-07-29 RX ORDER — TRAMADOL HYDROCHLORIDE 50 MG/1
50 TABLET ORAL
OUTPATIENT
Start: 2019-07-29

## 2019-07-29 RX ORDER — TRAMADOL HYDROCHLORIDE 50 MG/1
TABLET ORAL
Qty: 120 TAB | Refills: 0 | OUTPATIENT
Start: 2019-07-29

## 2019-07-31 ENCOUNTER — OFFICE VISIT (OUTPATIENT)
Dept: FAMILY MEDICINE CLINIC | Age: 64
End: 2019-07-31

## 2019-07-31 VITALS
HEIGHT: 63 IN | BODY MASS INDEX: 33.66 KG/M2 | TEMPERATURE: 98.9 F | RESPIRATION RATE: 18 BRPM | OXYGEN SATURATION: 99 % | HEART RATE: 108 BPM | SYSTOLIC BLOOD PRESSURE: 118 MMHG | DIASTOLIC BLOOD PRESSURE: 74 MMHG | WEIGHT: 190 LBS

## 2019-07-31 DIAGNOSIS — E78.00 HYPERCHOLESTEROLEMIA: ICD-10-CM

## 2019-07-31 DIAGNOSIS — Z12.11 COLON CANCER SCREENING: ICD-10-CM

## 2019-07-31 DIAGNOSIS — I10 ESSENTIAL HYPERTENSION WITH GOAL BLOOD PRESSURE LESS THAN 130/80: ICD-10-CM

## 2019-07-31 DIAGNOSIS — E11.42 DIABETIC POLYNEUROPATHY ASSOCIATED WITH TYPE 2 DIABETES MELLITUS (HCC): Primary | ICD-10-CM

## 2019-07-31 RX ORDER — ROSUVASTATIN CALCIUM 10 MG/1
TABLET, COATED ORAL
Qty: 90 TAB | Refills: 3 | Status: SHIPPED | OUTPATIENT
Start: 2019-07-31 | End: 2020-02-24 | Stop reason: SDUPTHER

## 2019-07-31 RX ORDER — TRAMADOL HYDROCHLORIDE 50 MG/1
50 TABLET ORAL
Qty: 120 TAB | Refills: 0 | Status: SHIPPED | OUTPATIENT
Start: 2019-07-31 | End: 2019-09-06 | Stop reason: SDUPTHER

## 2019-07-31 RX ORDER — GABAPENTIN 300 MG/1
300 CAPSULE ORAL 2 TIMES DAILY
Qty: 60 CAP | Refills: 0 | Status: SHIPPED | OUTPATIENT
Start: 2019-07-31 | End: 2019-11-05

## 2019-07-31 RX ORDER — HYDROCHLOROTHIAZIDE 25 MG/1
TABLET ORAL
Qty: 90 TAB | Refills: 3 | Status: SHIPPED | OUTPATIENT
Start: 2019-07-31 | End: 2020-02-24 | Stop reason: SDUPTHER

## 2019-07-31 RX ORDER — AMLODIPINE AND BENAZEPRIL HYDROCHLORIDE 10; 20 MG/1; MG/1
1 CAPSULE ORAL DAILY
Qty: 90 CAP | Refills: 3 | Status: SHIPPED | OUTPATIENT
Start: 2019-07-31 | End: 2020-02-24 | Stop reason: SDUPTHER

## 2019-07-31 NOTE — PROGRESS NOTES
Chief Complaint   Patient presents with    Other     discuss Tramadol medication      1. Have you been to the ER, urgent care clinic since your last visit? Hospitalized since your last visit? No    2. Have you seen or consulted any other health care providers outside of the [address] [place] [address] since your last visit? Include any pap smears or colon screening. No     Reviewed record in preparation for visit and have obtained necessary documentation.

## 2019-08-05 NOTE — PROGRESS NOTES
Cintia Olson is an 61 y.o. female who presents for:    Tramadol refill -- takes medication for diabetic peripheral neuropathy -- has tried other medications, including gabapentin, which are not effective. She has been on the same dosage for a long time -- she has tried to taper it but has been unsuccessful. Plan to taper gabapentin and get her off the medication. She continues to have tingling and burning in her feet. Pt is now being followed by endocrinologist, Dr. Rafael Gorman. Pt reports that Dr. Jory Matthews recently injected her shoulder and back -- states that at the most she got two days of pain relief. Colonoscopy done 8/6/19    Informed patient that I could continue to refill her tramadol on the following conditions:    1) must sign pain contract with me being the only person who can prescribe her tramadol; she must be seen at least every 6 months    2)  Drug screen every 6 months     3)   must be reviewed at every visit    4)  Patient must have narcan available in case of accidental overdose. Allergies - reviewed: Allergies   Allergen Reactions    Aspirin Hives     Tolerates ibuprofen and naproxen    Clindamycin Rash and Hives         Medications - reviewed:   Current Outpatient Medications   Medication Sig    traMADol (ULTRAM) 50 mg tablet Take 1 Tab by mouth four (4) times daily as needed for Pain (Takes Two tablets in AM, one Tablet in afternoon, and one tablet in evening) for up to 30 days. Max Daily Amount: 200 mg. Indications: Pain    gabapentin (NEURONTIN) 300 mg capsule Take 1 Cap by mouth two (2) times a day. Max Daily Amount: 600 mg. Indications: Neuropathic Pain    rosuvastatin (CRESTOR) 10 mg tablet TAKE 1 TABLET BY MOUTH DAILY    amLODIPine-benazepril (LOTREL) 10-20 mg per capsule Take 1 Cap by mouth daily.     hydroCHLOROthiazide (HYDRODIURIL) 25 mg tablet TAKE 1 TABLET BY MOUTH DAILY    peg 400-propylene glycol (SYSTANE, PROPYLENE GLYCOL,) 0.4-0.3 % drop Administer 1 Drop to both eyes as needed.  cyanocobalamin (VITAMIN B12) 100 mcg tablet Take 100 mcg by mouth daily.  nystatin (MYCOSTATIN) topical cream Apply  to affected area two (2) times a day.  glipiZIDE (GLUCOTROL) 10 mg tablet TAKE 1 TABLET BY MOUTH TWICE DAILY    dulaglutide (TRULICITY) 1.5 NI/5.6 mL sub-q pen 0.5 mL by SubCUTAneous route every seven (7) days.  metFORMIN (GLUCOPHAGE) 1,000 mg tablet Take 1 Tab by mouth two (2) times daily (with meals).  omega-3 fatty acids-vitamin e (FISH OIL) 1,000 mg Cap Take 1 Cap by mouth daily. No current facility-administered medications for this visit. Problem List - reviewed:  Patient Active Problem List   Diagnosis Code    Hypertension I10    Diabetes ([facility] E11.9    Hypercholesterolemia E78.00    Neuropathy due to secondary diabetes ([facility] E13.40    Lumbar stenosis M48.061    Lumbar herniated disc M51.26    Postcoital UTI N39.0    Type 2 diabetes mellitus with diabetic nephropathy, without long-term current use of insulin (HCC) E11.21    Type 2 diabetes mellitus with hyperglycemia, without long-term current use of insulin (HCC) E11.65    Closed nondisplaced fracture of first cervical vertebra (HCC) S12.001A    Closed fracture of multiple ribs of right side with routine healing S22.41XD    Type 2 diabetes mellitus with diabetic neuropathy (HCC) E11.40    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 9) E66.9    Shingles rash B02.9    S/P excision of lipoma Z98.890, Z86.018         Past Medical History - reviewed:  Past Medical History:   Diagnosis Date    Diabetes ([facility] 1995    Genital herpes simplex type 2     GERD (gastroesophageal reflux disease)     no medication prescribed    Hypercholesterolemia     Hypertension     Narcolepsy     Neuropathy in diabetes ([facility]     Obesity (BMI 30.0-34. 9)          Past Surgical History - reviewed:   Past Surgical History:   Procedure Laterality Date    HX  SECTION  03/15/1979    HX  SECTION  1990    HX KNEE REPLACEMENT Bilateral 2005    HX MOHS PROCEDURES Right 2007    HX OTHER SURGICAL Right 2019    Excision of lipoma of the right buttock.  HX ROTATOR CUFF REPAIR Right     [facility] EXTRAC ERUPTED TOOTH/EXPOSED ROOT Right 14    3 UPPER RIGHT SIDE BACK TEETH         Social History - reviewed:  Social History     Socioeconomic History    Marital status:      Spouse name: Not on file    Number of children: Not on file    Years of education: Not on file    Highest education level: Not on file   Occupational History    Not on file   Social Needs    Financial resource strain: Not on file    Food insecurity:     Worry: Not on file     Inability: Not on file    Transportation needs:     Medical: Not on file     Non-medical: Not on file   Tobacco Use    Smoking status: Never Smoker    Smokeless tobacco: Never Used   Substance and Sexual Activity    Alcohol use:  Yes     Alcohol/week: 0.0 - 1.0 standard drinks     Frequency: Never    Drug use: No    Sexual activity: Yes     Partners: Male     Birth control/protection: None   Lifestyle    Physical activity:     Days per week: Not on file     Minutes per session: Not on file    Stress: Not on file   Relationships    Social connections:     Talks on phone: Not on file     Gets together: Not on file     Attends Yazidi service: Not on file     Active member of club or organization: Not on file     Attends meetings of clubs or organizations: Not on file     Relationship status: Not on file    Intimate partner violence:     Fear of current or ex partner: Not on file     Emotionally abused: Not on file     Physically abused: Not on file     Forced sexual activity: Not on file   Other Topics Concern    Not on file   Social History Narrative    Not on file         Family History - reviewed:  Family History   Problem Relation Age of Onset    Diabetes Mother     Heart Disease Mother     Hypertension Mother    Ishaan Sharp Arthritis-rheumatoid Mother     Diabetes Father     Heart Disease Father     Cancer Maternal Aunt         bone    Diabetes Sister          Physical Exam  Visit Vitals  /74 (BP 1 Location: Left arm, BP Patient Position: Sitting)   Pulse (!) 108   Temp 98.9 °F (37.2 °C) (Oral)   Resp 18   Ht 5' 3\" (1.6 m)   Wt 190 lb (86.2 kg)   LMP 01/01/2008 (LMP Unknown)   SpO2 99%   BMI 33.66 kg/m²       General appearance - alert, well appearing, and in no distress  Psych -- normal affect      Assessment/Plan    ICD-10-CM ICD-9-CM    1. Diabetic polyneuropathy associated with type 2 diabetes mellitus (HCC) E11.42 250.60 traMADol (ULTRAM) 50 mg tablet     357.2 gabapentin (NEURONTIN) 300 mg capsule   2. Hypercholesterolemia E78.00 272.0 rosuvastatin (CRESTOR) 10 mg tablet   3. Essential hypertension with goal blood pressure less than 130/80 I10 401.9 amLODIPine-benazepril (LOTREL) 10-20 mg per capsule      hydroCHLOROthiazide (HYDRODIURIL) 25 mg tablet     Rx written  Urine drug screen to be done  Pain contract signed   reviewed -- I am the only  who refills this rx    I have discussed the diagnosis with the patient and the intended plan as seen in the above orders. The patient has received an after-visit summary and questions were answered concerning future plans. I have discussed medication side effects and warnings with the patient as well.       Lluvia Barnard MD

## 2019-08-06 ENCOUNTER — ANESTHESIA (OUTPATIENT)
Dept: ENDOSCOPY | Age: 64
End: 2019-08-06
Payer: MEDICARE

## 2019-08-06 ENCOUNTER — HOSPITAL ENCOUNTER (OUTPATIENT)
Age: 64
Setting detail: OUTPATIENT SURGERY
Discharge: HOME | End: 2019-08-06
Attending: INTERNAL MEDICINE | Admitting: INTERNAL MEDICINE
Payer: MEDICARE

## 2019-08-06 ENCOUNTER — ANESTHESIA EVENT (OUTPATIENT)
Dept: ENDOSCOPY | Age: 64
End: 2019-08-06
Payer: MEDICARE

## 2019-08-06 VITALS
HEIGHT: 63 IN | WEIGHT: 190.48 LBS | BODY MASS INDEX: 33.75 KG/M2 | SYSTOLIC BLOOD PRESSURE: 128 MMHG | DIASTOLIC BLOOD PRESSURE: 86 MMHG | RESPIRATION RATE: 16 BRPM | HEART RATE: 91 BPM | OXYGEN SATURATION: 97 % | TEMPERATURE: 98.3 F

## 2019-08-06 LAB
GLUCOSE BLD STRIP.AUTO-MCNC: 158 MG/DL (ref 65–100)
SERVICE CMNT-IMP: ABNORMAL

## 2019-08-06 PROCEDURE — 76040000019: Performed by: INTERNAL MEDICINE

## 2019-08-06 PROCEDURE — 76060000031 HC ANESTHESIA FIRST 0.5 HR: Performed by: INTERNAL MEDICINE

## 2019-08-06 PROCEDURE — 82962 GLUCOSE BLOOD TEST: CPT

## 2019-08-06 PROCEDURE — 74011250636 HC RX REV CODE- 250/636: Performed by: NURSE ANESTHETIST, CERTIFIED REGISTERED

## 2019-08-06 RX ORDER — EPINEPHRINE 0.1 MG/ML
1 INJECTION INTRACARDIAC; INTRAVENOUS
Status: DISCONTINUED | OUTPATIENT
Start: 2019-08-06 | End: 2019-08-06 | Stop reason: HOSPADM

## 2019-08-06 RX ORDER — ATROPINE SULFATE 0.1 MG/ML
0.4 INJECTION INTRAVENOUS
Status: DISCONTINUED | OUTPATIENT
Start: 2019-08-06 | End: 2019-08-06 | Stop reason: HOSPADM

## 2019-08-06 RX ORDER — PROPOFOL 10 MG/ML
INJECTION, EMULSION INTRAVENOUS AS NEEDED
Status: DISCONTINUED | OUTPATIENT
Start: 2019-08-06 | End: 2019-08-06 | Stop reason: HOSPADM

## 2019-08-06 RX ORDER — LIDOCAINE HYDROCHLORIDE 20 MG/ML
INJECTION, SOLUTION EPIDURAL; INFILTRATION; INTRACAUDAL; PERINEURAL AS NEEDED
Status: DISCONTINUED | OUTPATIENT
Start: 2019-08-06 | End: 2019-08-06 | Stop reason: HOSPADM

## 2019-08-06 RX ORDER — FLUMAZENIL 0.1 MG/ML
0.2 INJECTION INTRAVENOUS
Status: DISCONTINUED | OUTPATIENT
Start: 2019-08-06 | End: 2019-08-06 | Stop reason: HOSPADM

## 2019-08-06 RX ORDER — PROPOFOL 10 MG/ML
INJECTION, EMULSION INTRAVENOUS
Status: DISCONTINUED | OUTPATIENT
Start: 2019-08-06 | End: 2019-08-06 | Stop reason: HOSPADM

## 2019-08-06 RX ORDER — SODIUM CHLORIDE 9 MG/ML
50 INJECTION, SOLUTION INTRAVENOUS CONTINUOUS
Status: DISCONTINUED | OUTPATIENT
Start: 2019-08-06 | End: 2019-08-06 | Stop reason: HOSPADM

## 2019-08-06 RX ORDER — ONDANSETRON 2 MG/ML
INJECTION INTRAMUSCULAR; INTRAVENOUS AS NEEDED
Status: DISCONTINUED | OUTPATIENT
Start: 2019-08-06 | End: 2019-08-06 | Stop reason: HOSPADM

## 2019-08-06 RX ORDER — MIDAZOLAM HYDROCHLORIDE 1 MG/ML
.25-5 INJECTION, SOLUTION INTRAMUSCULAR; INTRAVENOUS
Status: DISCONTINUED | OUTPATIENT
Start: 2019-08-06 | End: 2019-08-06 | Stop reason: HOSPADM

## 2019-08-06 RX ORDER — NALOXONE HYDROCHLORIDE 0.4 MG/ML
0.4 INJECTION, SOLUTION INTRAMUSCULAR; INTRAVENOUS; SUBCUTANEOUS
Status: DISCONTINUED | OUTPATIENT
Start: 2019-08-06 | End: 2019-08-06 | Stop reason: HOSPADM

## 2019-08-06 RX ORDER — DEXTROMETHORPHAN/PSEUDOEPHED 2.5-7.5/.8
1.2 DROPS ORAL
Status: DISCONTINUED | OUTPATIENT
Start: 2019-08-06 | End: 2019-08-06 | Stop reason: HOSPADM

## 2019-08-06 RX ADMIN — ONDANSETRON 4 MG: 2 INJECTION INTRAMUSCULAR; INTRAVENOUS at 15:01

## 2019-08-06 RX ADMIN — LIDOCAINE HYDROCHLORIDE 20 MG: 20 INJECTION, SOLUTION INTRAVENOUS at 15:01

## 2019-08-06 RX ADMIN — PROPOFOL 140 MCG/KG/MIN: 10 INJECTION, EMULSION INTRAVENOUS at 15:01

## 2019-08-06 RX ADMIN — PROPOFOL 70 MG: 10 INJECTION, EMULSION INTRAVENOUS at 15:01

## 2019-08-06 NOTE — ANESTHESIA PREPROCEDURE EVALUATION
Relevant Problems   No relevant active problems       Anesthetic History   No history of anesthetic complications            Review of Systems / Medical History  Patient summary reviewed and nursing notes reviewed    Pulmonary  Within defined limits                 Neuro/Psych              Cardiovascular    Hypertension: well controlled              Exercise tolerance: >4 METS     GI/Hepatic/Renal     GERD: well controlled           Endo/Other    Diabetes: poorly controlled, type 2    Obesity     Other Findings              Physical Exam    Airway  Mallampati: II    Neck ROM: normal range of motion   Mouth opening: Normal     Cardiovascular    Rhythm: regular  Rate: normal         Dental      Comments: 11 missing teeth   Pulmonary  Breath sounds clear to auscultation               Abdominal         Other Findings            Anesthetic Plan    ASA: 3  Anesthesia type: MAC            Anesthetic plan and risks discussed with: Patient      Informed consent obtained.

## 2019-08-06 NOTE — ROUTINE PROCESS
Lilo Kingston  1955  [id number]    Situation:  Verbal report received from: Bishop Tan RN  Procedure: Procedure(s):  COLONOSCOPY    Background:    Preoperative diagnosis: COLON CANCER SCREENING  Postoperative diagnosis: 1.- Diverticulosis    :  Dr. Shari Samaniego  Assistant(s): Endoscopy Technician-1: Fatuma Herrmann  Endoscopy RN-1: Sam Madsen RN    Specimens: * No specimens in log *  H. Pylori  no    Assessment:  Intra-procedure medications     Anesthesia gave intra-procedure sedation and medications, see anesthesia flow sheet yes    Intravenous fluids: NS@ KVO     Vital signs stable     Abdominal assessment: round and soft     Recommendation:  Discharge patient per MD order.   Family or Friend   Permission to share finding with family or friend yes

## 2019-08-06 NOTE — ANESTHESIA POSTPROCEDURE EVALUATION
Procedure(s):  COLONOSCOPY. MAC    Anesthesia Post Evaluation      Multimodal analgesia: multimodal analgesia not used between 6 hours prior to anesthesia start to PACU discharge  Patient location during evaluation: PACU  Patient participation: complete - patient participated  Level of consciousness: awake and alert  Pain score: 0  Pain management: adequate  Airway patency: patent  Anesthetic complications: no  Cardiovascular status: hemodynamically stable and acceptable  Respiratory status: acceptable  Hydration status: acceptable  Comments: Patient seen and evaluated; no concerns. Post anesthesia nausea and vomiting:  none      Vitals Value Taken Time   /97 8/6/2019  3:49 PM   Temp     Pulse 91 8/6/2019  3:53 PM   Resp 19 8/6/2019  3:53 PM   SpO2 99 % 8/6/2019  3:53 PM   Vitals shown include unvalidated device data.

## 2019-08-06 NOTE — PROCEDURES
Marely Walton M.D.  [phone]            2019          Colonoscopy Operative Report  Michelle Upton  :  1955  [facility] Medical Record Number:  [id number]      Indications:    Screening colonoscopy     :  Janey Moscoso MD    Referring Provider: Yulia Borjas MD    Sedation:  MAC anesthesia    Pre-Procedural Exam:      Airway: clear,  No airway problems anticipated  Heart: RRR, without gallops or rubs  Lungs: clear bilaterally without wheezes, crackles, or rhonchi  Abdomen: soft, nontender, nondistended, bowel sounds present  Mental Status: awake, alert and oriented to person, place and time     Procedure Details:  After informed consent was obtained with all risks and benefits of procedure explained and preoperative exam completed, the patient was taken to the endoscopy suite and placed in the left lateral decubitus position. Upon sequential sedation as per above, a digital rectal exam was performed. The Olympus videocolonoscope  was inserted in the rectum and carefully advanced to the cecum, which was identified by the ileocecal valve and appendiceal orifice. The quality of preparation was good. The colonoscope was slowly withdrawn with careful inspection and evaluation between folds. Retroflexion in the rectum was performed. Findings:   Terminal Ileum: not intubated  Cecum: normal  Ascending Colon: normal  Transverse Colon: normal  Descending Colon: normal  Sigmoid: no mucosal lesion appreciated  mild diverticulosis; Rectum: no mucosal lesion appreciated  Grade 1 internal hemorrhoid(s); Interventions:  none    Specimen Removed:  none    Complications: None. EBL:  None. Impression:  Sigmoid diverticulosis and internal hemorrhoids, otherwise mucosa within normal throughout the colon. Recommendations:  -Repeat colonoscopy in 10 years   -High fiber diet.    -Resume normal medication(s).      Discharge Disposition:  Home in the company of a  when able to ambulate.     Iliana Saunders MD  8/6/2019  3:17 PM

## 2019-08-06 NOTE — DISCHARGE INSTRUCTIONS
[facility]. Rosalba Velarde M.D.  [phone]            COLON DISCHARGE INSTRUCTIONS       2019    Mookie Bloom  :  1955  [facility] Medical Record Number:  [id number]      COLONOSCOPY FINDINGS:  Your colonoscopy showed: diverticulosis and small internal hemorrhoids, otherwise looked within normal.    DISCOMFORT:  Redness at IV site- apply warm compress to area; if redness or soreness persist- contact your physician  There may be a slight amount of blood passed from the rectum  Gaseous discomfort- walking, belching will help relieve any discomfort  You may not operate a vehicle for 12 hours  You may not engage in an occupation involving machinery or appliances for rest of today  You may not drink alcoholic beverages for at least 12 hours  Avoid making any critical decisions for at least 24 hour  DIET:   High fiber diet. - however -  remember your colon is empty and a heavy meal will produce gas. Avoid these foods:  vegetables, fried / greasy foods, carbonated drinks for today     ACTIVITY:  You may resume your normal daily activities it is recommended that you spend the remainder of the day resting -  avoid any strenuous activity. CALL M.D. ANY SIGN OF:   Increasing pain, nausea, vomiting  Abdominal distension (swelling)  New increased bleeding (oral or rectal)  Fever (chills)  Pain in chest area  Bloody discharge from nose or mouth   Shortness of breath    Follow-up Instructions:   Call Dr. Tonie Walter if any questions or problems. Telephone # [phone]    Should have a repeat colonoscopy in 10 years.

## 2019-08-06 NOTE — H&P
Sean Astorga M.D.  [phone]            History and Physical       NAME:  Meryl Sandoval   :   1955   MRN:   [id number]       Referring Physician:  Dr. Marquez Green Date: 2019 12:27 PM    Chief Complaint:  Colon cancer screening    History of Present Illness:  Patient is a 61 y.o. who is seen for colon cancer screening. Denies any ongoing GI complaints. PMH:  Past Medical History:   Diagnosis Date    Diabetes ([facility]     Genital herpes simplex type 2     GERD (gastroesophageal reflux disease)     no medication prescribed    Hypercholesterolemia     Hypertension     Narcolepsy     Neuropathy in diabetes ([facility]     Obesity (BMI 30.0-34. 9)        PSH:  Past Surgical History:   Procedure Laterality Date    HX  SECTION  03/15/1979    HX  SECTION  1990    HX KNEE REPLACEMENT Bilateral     HX MOHS PROCEDURES Right     HX OTHER SURGICAL Right 2019    Excision of lipoma of the right buttock.  HX ROTATOR CUFF REPAIR Right     [facility] EXTRAC ERUPTED TOOTH/EXPOSED ROOT Right 14    3 UPPER RIGHT SIDE BACK TEETH       Allergies: Allergies   Allergen Reactions    Aspirin Hives     Tolerates ibuprofen and naproxen    Clindamycin Rash and Hives       Home Medications:  Cannot display prior to admission medications because the patient has not been admitted in this contact. Hospital Medications:  No current facility-administered medications for this encounter. Current Outpatient Medications   Medication Sig    traMADol (ULTRAM) 50 mg tablet Take 1 Tab by mouth four (4) times daily as needed for Pain (Takes Two tablets in AM, one Tablet in afternoon, and one tablet in evening) for up to 30 days. Max Daily Amount: 200 mg. Indications: Pain    gabapentin (NEURONTIN) 300 mg capsule Take 1 Cap by mouth two (2) times a day. Max Daily Amount: 600 mg.  Indications: Neuropathic Pain    rosuvastatin (CRESTOR) 10 mg tablet TAKE 1 TABLET BY MOUTH DAILY    amLODIPine-benazepril (LOTREL) 10-20 mg per capsule Take 1 Cap by mouth daily.  hydroCHLOROthiazide (HYDRODIURIL) 25 mg tablet TAKE 1 TABLET BY MOUTH DAILY    peg 400-propylene glycol (SYSTANE, PROPYLENE GLYCOL,) 0.4-0.3 % drop Administer 1 Drop to both eyes as needed.  cyanocobalamin (VITAMIN B12) 100 mcg tablet Take 100 mcg by mouth daily.  nystatin (MYCOSTATIN) topical cream Apply  to affected area two (2) times a day.  glipiZIDE (GLUCOTROL) 10 mg tablet TAKE 1 TABLET BY MOUTH TWICE DAILY    dulaglutide (TRULICITY) 1.5 DL/9.4 mL sub-q pen 0.5 mL by SubCUTAneous route every seven (7) days.  metFORMIN (GLUCOPHAGE) 1,000 mg tablet Take 1 Tab by mouth two (2) times daily (with meals).  omega-3 fatty acids-vitamin e (FISH OIL) 1,000 mg Cap Take 1 Cap by mouth daily. Social History:  Social History     Tobacco Use    Smoking status: Never Smoker    Smokeless tobacco: Never Used   Substance Use Topics    Alcohol use:  Yes     Alcohol/week: 0.0 - 1.0 standard drinks     Frequency: Never       Family History:  Family History   Problem Relation Age of Onset    Diabetes Mother     Heart Disease Mother     Hypertension Mother    Rebekah Larch Arthritis-rheumatoid Mother     Diabetes Father     Heart Disease Father     Cancer Maternal Aunt         bone    Diabetes Sister              Review of Systems:      Constitutional: negative fever, negative chills, negative weight loss  Eyes:   negative visual changes  ENT:   negative sore throat, tongue or lip swelling  Respiratory:  negative cough, negative dyspnea  Cards:  negative for chest pain, palpitations, lower extremity edema  GI:   See HPI  :  negative for frequency, dysuria  Integument:  negative for rash and pruritus  Heme:  negative for easy bruising and gum/nose bleeding  Musculoskel: negative for myalgias,  back pain and muscle weakness  Neuro: negative for headaches, dizziness, vertigo  Psych:  negative for feelings of anxiety, depression       Objective:   No data found. No intake/output data recorded. No intake/output data recorded. EXAM:     NEURO-a&o   HEENT-wnl   LUNGS-clear    COR-regular rate and rhythym     ABD-soft , no tenderness, no rebound, good bs     EXT-no edema     Data Review     No results for input(s): WBC, HGB, HCT, PLT, HGBEXT, HCTEXT, PLTEXT in the last 72 hours. No results for input(s): NA, K, CL, CO2, BUN, CREA, GLU, PHOS, CA in the last 72 hours. No results for input(s): SGOT, GPT, AP, TBIL, TP, ALB, GLOB, GGT, AML, LPSE in the last 72 hours. No lab exists for component: AMYP, HLPSE  No results for input(s): INR, PTP, APTT in the last 72 hours. No lab exists for component: INREXT    Patient Active Problem List   Diagnosis Code    Hypertension I10    Diabetes ([facility] E11.9    Hypercholesterolemia E78.00    Neuropathy due to secondary diabetes ([facility] E13.40    Lumbar stenosis M48.061    Lumbar herniated disc M51.26    Postcoital UTI N39.0    Type 2 diabetes mellitus with diabetic nephropathy, without long-term current use of insulin (HCC) E11.21    Type 2 diabetes mellitus with hyperglycemia, without long-term current use of insulin (HCC) E11.65    Closed nondisplaced fracture of first cervical vertebra (HCC) S12.001A    Closed fracture of multiple ribs of right side with routine healing S22.41XD    Type 2 diabetes mellitus with diabetic neuropathy ([facility]) E11.40    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 9) E66.9    Shingles rash B02.9    S/P excision of lipoma Z98.890, Z86.018      Assessment:   · Colon cancer screening   Plan:   · Colonoscopy today.      Signed By: Ron Lake MD     8/6/2019  12:27 PM

## 2019-08-06 NOTE — PROGRESS NOTES

## 2019-08-14 ENCOUNTER — OFFICE VISIT (OUTPATIENT)
Dept: FAMILY MEDICINE CLINIC | Age: 64
End: 2019-08-14

## 2019-08-14 VITALS
OXYGEN SATURATION: 97 % | DIASTOLIC BLOOD PRESSURE: 75 MMHG | SYSTOLIC BLOOD PRESSURE: 116 MMHG | HEART RATE: 80 BPM | HEIGHT: 63 IN | TEMPERATURE: 98.8 F | BODY MASS INDEX: 35.26 KG/M2 | RESPIRATION RATE: 18 BRPM | WEIGHT: 199 LBS

## 2019-08-14 DIAGNOSIS — E66.01 SEVERE OBESITY (HCC): ICD-10-CM

## 2019-08-14 DIAGNOSIS — M54.50 CHRONIC MIDLINE LOW BACK PAIN WITHOUT SCIATICA: Primary | ICD-10-CM

## 2019-08-14 DIAGNOSIS — G89.29 CHRONIC MIDLINE LOW BACK PAIN WITHOUT SCIATICA: Primary | ICD-10-CM

## 2019-08-14 NOTE — PROGRESS NOTES
Identified Patient with two Patient identifiers (Name and ). Two Patient Identifiers confirmed. Reviewed record in preparation for visit and have obtained necessary documentation. Chief Complaint   Patient presents with    Back Pain     follow up       Visit Vitals  /75 (BP 1 Location: Right arm, BP Patient Position: Sitting)   Pulse 80   Temp 98.8 °F (37.1 °C) (Oral)   Resp 18   Ht 5' 3\" (1.6 m)   Wt 199 lb (90.3 kg)   SpO2 97%   BMI 35.25 kg/m²       1. Have you been to the ER, urgent care clinic since your last visit? Hospitalized since your last visit? No    2. Have you seen or consulted any other health care providers outside of the [address] [place] [address] since your last visit? Include any pap smears or colon screening.  No
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
## 2019-09-05 ENCOUNTER — HOSPITAL ENCOUNTER (OUTPATIENT)
Dept: MRI IMAGING | Age: 64
Discharge: HOME | End: 2019-09-05
Attending: FAMILY MEDICINE
Payer: MEDICARE

## 2019-09-05 ENCOUNTER — TELEPHONE (OUTPATIENT)
Dept: FAMILY MEDICINE CLINIC | Age: 64
End: 2019-09-05

## 2019-09-05 DIAGNOSIS — M54.50 CHRONIC MIDLINE LOW BACK PAIN WITHOUT SCIATICA: ICD-10-CM

## 2019-09-05 DIAGNOSIS — G89.29 CHRONIC MIDLINE LOW BACK PAIN WITHOUT SCIATICA: ICD-10-CM

## 2019-09-05 PROCEDURE — 72148 MRI LUMBAR SPINE W/O DYE: CPT

## 2019-09-05 NOTE — TELEPHONE ENCOUNTER
[phone]  Patient called for this refill and said the pharmacy was to have sent the request yesterday. She said Dr. Wilfredo Arenas fills this medication for her.

## 2019-09-06 ENCOUNTER — TELEPHONE (OUTPATIENT)
Dept: FAMILY MEDICINE CLINIC | Age: 64
End: 2019-09-06

## 2019-09-06 DIAGNOSIS — E11.42 DIABETIC POLYNEUROPATHY ASSOCIATED WITH TYPE 2 DIABETES MELLITUS (HCC): ICD-10-CM

## 2019-09-06 RX ORDER — TRAMADOL HYDROCHLORIDE 50 MG/1
50 TABLET ORAL
Qty: 120 TAB | Refills: 0 | Status: SHIPPED | OUTPATIENT
Start: 2019-09-06 | End: 2019-10-08 | Stop reason: SDUPTHER

## 2019-09-06 NOTE — TELEPHONE ENCOUNTER
Patient called to say she is out of medication and needs it now as she could not sleep at all last night.

## 2019-09-06 NOTE — TELEPHONE ENCOUNTER
Patient called and stated she was contacted and told tramadol was ready for . She claims whom ever she talked to told her that her daughter was on her HIPPA and could  medication for her because she's in pain. She didn't list anyone on her hippa and I explained to her that this need to be updated to have her added. She said she thought she had done that already. I told per per supervisor ( hue jones) verified with Practice Admin ( [address] [place] [address]) that willl allow this one time for her daughter to . She understands.

## 2019-09-06 NOTE — TELEPHONE ENCOUNTER
Patient states Dr. Tyrell Squires told her to follow up in 2 days. No appointments available within time frame.     Please advise

## 2019-09-09 NOTE — TELEPHONE ENCOUNTER
Approved by physician;closing. traMADol (ULTRAM) 50 mg tablet 120 Tab 0 9/6/2019 10/6/2019    Sig - Route: Take 1 Tab by mouth four (4) times daily as needed for Pain (Takes Two tablets in AM, one Tablet in afternoon, and one tablet in evening) for up to 30 days. Max Daily Amount: 200 mg.  Indications: pain - Oral    Class: Print

## 2019-09-11 ENCOUNTER — OFFICE VISIT (OUTPATIENT)
Dept: FAMILY MEDICINE CLINIC | Age: 64
End: 2019-09-11

## 2019-09-11 VITALS
HEIGHT: 63 IN | DIASTOLIC BLOOD PRESSURE: 75 MMHG | HEART RATE: 100 BPM | OXYGEN SATURATION: 97 % | WEIGHT: 198 LBS | BODY MASS INDEX: 35.08 KG/M2 | TEMPERATURE: 98.1 F | SYSTOLIC BLOOD PRESSURE: 125 MMHG | RESPIRATION RATE: 18 BRPM

## 2019-09-11 DIAGNOSIS — G89.29 CHRONIC BACK PAIN, UNSPECIFIED BACK LOCATION, UNSPECIFIED BACK PAIN LATERALITY: Primary | ICD-10-CM

## 2019-09-11 DIAGNOSIS — M54.9 CHRONIC BACK PAIN, UNSPECIFIED BACK LOCATION, UNSPECIFIED BACK PAIN LATERALITY: Primary | ICD-10-CM

## 2019-09-11 DIAGNOSIS — M48.061 SPINAL STENOSIS OF LUMBAR REGION, UNSPECIFIED WHETHER NEUROGENIC CLAUDICATION PRESENT: ICD-10-CM

## 2019-09-11 DIAGNOSIS — M75.41 ROTATOR CUFF IMPINGEMENT SYNDROME OF RIGHT SHOULDER: ICD-10-CM

## 2019-09-11 RX ORDER — TRIAMCINOLONE ACETONIDE 40 MG/ML
40 INJECTION, SUSPENSION INTRA-ARTICULAR; INTRAMUSCULAR ONCE
Qty: 1 ML | Refills: 0
Start: 2019-09-11 | End: 2019-09-11

## 2019-09-11 RX ORDER — LIDOCAINE HYDROCHLORIDE 10 MG/ML
3 INJECTION INFILTRATION; PERINEURAL ONCE
Qty: 3 ML | Refills: 0
Start: 2019-09-11 | End: 2019-09-11

## 2019-09-11 NOTE — PROGRESS NOTES
Identified Patient with two Patient identifiers (Name and ). Two Patient Identifiers confirmed. Reviewed record in preparation for visit and have obtained necessary documentation. Chief Complaint   Patient presents with    Abnormal MRI     discuss MRI results       Visit Vitals  /75 (BP 1 Location: Right arm, BP Patient Position: Sitting)   Pulse 100   Temp 98.1 °F (36.7 °C) (Oral)   Resp 18   Ht 5' 3\" (1.6 m)   Wt 198 lb (89.8 kg)   SpO2 97%   BMI 35.07 kg/m²       1. Have you been to the ER, urgent care clinic since your last visit? Hospitalized since your last visit? No    2. Have you seen or consulted any other health care providers outside of the [address] [place] [address] since your last visit? Include any pap smears or colon screening.  No

## 2019-09-11 NOTE — PATIENT INSTRUCTIONS
Shoulder Pain: Care Instructions  Your Care Instructions    You can hurt your shoulder by using it too much during an activity, such as fishing or baseball. It can also happen as part of the everyday wear and tear of getting older. Shoulder injuries can be slow to heal, but your shoulder should get better with time. Your doctor may recommend a sling to rest your shoulder. If you have injured your shoulder, you may need testing and treatment. Follow-up care is a key part of your treatment and safety. Be sure to make and go to all appointments, and call your doctor if you are having problems. It's also a good idea to know your test results and keep a list of the medicines you take. How can you care for yourself at home? · Take pain medicines exactly as directed. ? If the doctor gave you a prescription medicine for pain, take it as prescribed. ? If you are not taking a prescription pain medicine, ask your doctor if you can take an over-the-counter medicine. ? Do not take two or more pain medicines at the same time unless the doctor told you to. Many pain medicines contain acetaminophen, which is Tylenol. Too much acetaminophen (Tylenol) can be harmful. · If your doctor recommends that you wear a sling, use it as directed. Do not take it off before your doctor tells you to. · Put ice or a cold pack on the sore area for 10 to 20 minutes at a time. Put a thin cloth between the ice and your skin. · If there is no swelling, you can put moist heat, a heating pad, or a warm cloth on your shoulder. Some doctors suggest alternating between hot and cold. · Rest your shoulder for a few days. If your doctor recommends it, you can then begin gentle exercise of the shoulder, but do not lift anything heavy. When should you call for help? Call 911 anytime you think you may need emergency care. For example, call if:    · You have chest pain or pressure. This may occur with:  ? Sweating. ?  Shortness of breath. ? Nausea or vomiting. ? Pain that spreads from the chest to the neck, jaw, or one or both shoulders or arms. ? Dizziness or lightheadedness. ? A fast or uneven pulse. After calling 911, chew 1 adult-strength aspirin. Wait for an ambulance. Do not try to drive yourself.     · Your arm or hand is cool or pale or changes color.    Call your doctor now or seek immediate medical care if:    · You have signs of infection, such as:  ? Increased pain, swelling, warmth, or redness in your shoulder. ? Red streaks leading from a place on your shoulder. ? Pus draining from an area of your shoulder. ? Swollen lymph nodes in your neck, armpits, or groin. ? A fever.    Watch closely for changes in your health, and be sure to contact your doctor if:    · You cannot use your shoulder.     · Your shoulder does not get better as expected. Where can you learn more? Go to http://yesenia-anshu.info/. Enter S323 in the search box to learn more about \"Shoulder Pain: Care Instructions. \"  Current as of: September 20, 2018  Content Version: 12.1  © 3247-2690 [employer]. Care instructions adapted under license by [employer] (which disclaims liability or warranty for this information). If you have questions about a medical condition or this instruction, always ask your healthcare professional. [address] any warranty or liability for your use of this information.
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
## 2019-09-12 NOTE — TELEPHONE ENCOUNTER
Attempted to call patient to clarify if the medication was sent to Dr Gautam Orantes or the previous provider. Per chart Dr Nestor Ruiz was the last provider to send the prescription to her pharmacy. I reviewed Dr Gautam Orantes attached RX request and there isn't a prescription for patient. Will send request to Dr Gautam Orantes. Oriented - self; Oriented - place; Oriented - time

## 2019-09-20 PROBLEM — Z12.11 COLON CANCER SCREENING: Status: RESOLVED | Noted: 2019-04-16 | Resolved: 2019-09-20

## 2019-10-08 DIAGNOSIS — E11.42 DIABETIC POLYNEUROPATHY ASSOCIATED WITH TYPE 2 DIABETES MELLITUS (HCC): ICD-10-CM

## 2019-10-08 NOTE — TELEPHONE ENCOUNTER
Requested Prescriptions     Pending Prescriptions Disp Refills    traMADol (ULTRAM) 50 mg tablet 120 Tab 0     Sig: Take 1 Tab by mouth four (4) times daily as needed for Pain (Takes Two tablets in AM, one Tablet in afternoon, and one tablet in evening) for up to 30 days. Max Daily Amount: 200 mg.  Indications: pain

## 2019-10-09 ENCOUNTER — TELEPHONE (OUTPATIENT)
Dept: FAMILY MEDICINE CLINIC | Age: 64
End: 2019-10-09

## 2019-10-09 RX ORDER — TRAMADOL HYDROCHLORIDE 50 MG/1
50 TABLET ORAL
Qty: 120 TAB | Refills: 0 | Status: SHIPPED | OUTPATIENT
Start: 2019-10-09 | End: 2019-11-06 | Stop reason: SDUPTHER

## 2019-10-09 NOTE — TELEPHONE ENCOUNTER
Patient called to check the status. She states she is in pain. Please contact patient when addressed.

## 2019-10-09 NOTE — TELEPHONE ENCOUNTER
----- Message from Lily Calvo sent at 10/9/2019  4:35 PM EDT -----  Regarding: Dr. Ott Sos  General Message/Vendor Calls    Caller's first and last name: Alexandro Reid       Reason for call: Pt called to follow up on her request for her pain medication Tramadol. She expressed that she is in a lot of pain.        Callback required yes/no and why: yes- pt in a lot of pain and is completely out of Tramadol      Best contact number(s): [phone]      Details to clarify the request:       Lily Calvo

## 2019-10-11 DIAGNOSIS — E11.42 DIABETIC POLYNEUROPATHY ASSOCIATED WITH TYPE 2 DIABETES MELLITUS (HCC): ICD-10-CM

## 2019-10-11 DIAGNOSIS — E11.65 TYPE 2 DIABETES MELLITUS WITH HYPERGLYCEMIA, WITHOUT LONG-TERM CURRENT USE OF INSULIN (HCC): ICD-10-CM

## 2019-10-11 RX ORDER — TRAMADOL HYDROCHLORIDE 50 MG/1
TABLET ORAL
Qty: 120 TAB | Refills: 0 | OUTPATIENT
Start: 2019-10-11

## 2019-10-28 ENCOUNTER — TELEPHONE (OUTPATIENT)
Dept: FAMILY MEDICINE CLINIC | Age: 64
End: 2019-10-28

## 2019-10-28 NOTE — TELEPHONE ENCOUNTER
Call was transferred from [facility] to say the patient's chest is hurting and feels like \"somebody is sitting on it\". Also has shortness of breath. Spoke with the patient who was asked to hold for nurse advice. There was a phone disconnect and patient was called back. Nurse advice took the phone.

## 2019-10-28 NOTE — TELEPHONE ENCOUNTER
Spoke with Patient. She stated this has been going on for a month and a half now, where it is pressure feeling in her chest. No Sharp Pain or Shortness of breath. Declined to see another provider, wanted  and appointment was made for Nov 5th at 3pm. Patient advised to go to er if pressure turns into pain and any SOB starts.

## 2019-11-05 ENCOUNTER — OFFICE VISIT (OUTPATIENT)
Dept: FAMILY MEDICINE CLINIC | Age: 64
End: 2019-11-05

## 2019-11-05 VITALS
WEIGHT: 185 LBS | RESPIRATION RATE: 20 BRPM | OXYGEN SATURATION: 98 % | HEIGHT: 63 IN | DIASTOLIC BLOOD PRESSURE: 75 MMHG | HEART RATE: 77 BPM | BODY MASS INDEX: 32.78 KG/M2 | TEMPERATURE: 98.3 F | SYSTOLIC BLOOD PRESSURE: 111 MMHG

## 2019-11-05 DIAGNOSIS — R07.89 CHEST HEAVINESS: ICD-10-CM

## 2019-11-05 DIAGNOSIS — B35.1 ONYCHOMYCOSIS OF TOENAIL: Primary | ICD-10-CM

## 2019-11-05 DIAGNOSIS — R35.0 URINARY FREQUENCY: ICD-10-CM

## 2019-11-05 NOTE — PROGRESS NOTES
Mary Stephens is an 59 y.o. female who presents for:    Chest pain -- states that it feels like something is sitting on her chest    Discomfort is constant x 3 months    Stays the same    States was hoping it would go away    Denies any other sxs    States that she has to take a deep breath to make it feel better    States does not hurt to take a deep breath    Denies nausea, vomiting, SOB, radiation to shoulder/neck    Denies prior hx of chest pain    Also has experienced unintentional weight loss (htough her diabetic medications may be cause of weight loss)    + appetite    Allergies - reviewed: Allergies   Allergen Reactions    Aspirin Hives     Tolerates ibuprofen and naproxen    Clindamycin Rash and Hives         Medications - reviewed:   Current Outpatient Medications   Medication Sig    rosuvastatin (CRESTOR) 10 mg tablet TAKE 1 TABLET BY MOUTH DAILY    amLODIPine-benazepril (LOTREL) 10-20 mg per capsule Take 1 Cap by mouth daily.  hydroCHLOROthiazide (HYDRODIURIL) 25 mg tablet TAKE 1 TABLET BY MOUTH DAILY    peg 400-propylene glycol (SYSTANE, PROPYLENE GLYCOL,) 0.4-0.3 % drop Administer 1 Drop to both eyes as needed.  cyanocobalamin (VITAMIN B12) 100 mcg tablet Take 100 mcg by mouth daily.  nystatin (MYCOSTATIN) topical cream Apply  to affected area two (2) times a day.  glipiZIDE (GLUCOTROL) 10 mg tablet TAKE 1 TABLET BY MOUTH TWICE DAILY    dulaglutide (TRULICITY) 1.5 VX/0.2 mL sub-q pen 0.5 mL by SubCUTAneous route every seven (7) days.  omega-3 fatty acids-vitamin e (FISH OIL) 1,000 mg Cap Take 1 Cap by mouth daily.  metFORMIN (GLUCOPHAGE) 1,000 mg tablet Take 1 Tab by mouth two (2) times daily (with meals).  traMADol (ULTRAM) 50 mg tablet Take 1 Tab by mouth four (4) times daily as needed for Pain (Two tabs in am, one in afternoon and at night) for up to 30 days. Max Daily Amount: 200 mg.  Indications: pain     No current facility-administered medications for this visit. Problem List - reviewed:  Patient Active Problem List   Diagnosis Code    Hypertension I10    Diabetes ([facility] E11.9    Hypercholesterolemia E78.00    Other chest pain R07.89    [facility]uropathy due to secondary diabetes ([facility] E13.40    Lumbar stenosis M48.061    Lumbar herniated disc M51.26    Postcoital UTI N39.0    Type 2 diabetes mellitus with diabetic nephropathy, without long-term current use of insulin (HCC) E11.21    Type 2 diabetes mellitus with hyperglycemia, without long-term current use of insulin ([facility]) E11.65    Closed nondisplaced fracture of first cervical vertebra ([facility]) S12.001A    Closed fracture of multiple ribs of right side with routine healing S22.41XD    Type 2 diabetes mellitus with diabetic neuropathy ([facility]) E11.40    HSV-2 infection B00.9    Breast cancer screening Z12.39    Cervical cancer screening Z12.4    Obesity (BMI 30.0-34. 9) E66.9    Shingles rash B02.9    S/P excision of lipoma Z98.890, Z86.018    Severe obesity ([facility]) E66.01         Past Medical History - reviewed:  Past Medical History:   Diagnosis Date    Diabetes ([facility]     Genital herpes simplex type 2     GERD (gastroesophageal reflux disease)     no medication prescribed    Hypercholesterolemia     Hypertension     Narcolepsy     Neuropathy in diabetes ([facility]     Obesity (BMI 30.0-34. 9)          Past Surgical History - reviewed:   Past Surgical History:   Procedure Laterality Date    COLONOSCOPY N/A 2019    COLONOSCOPY performed by Twan Novoa MD at [facility] HX  SECTION  03/15/1979    HX  SECTION  1990    HX KNEE REPLACEMENT Bilateral     HX MOHS PROCEDURES Right     HX OTHER SURGICAL Right 2019    Excision of lipoma of the right buttock.     HX ROTATOR CUFF REPAIR Right     LA EXTRAC ERUPTED TOOTH/EXPOSED ROOT Right 14    3 UPPER RIGHT SIDE BACK TEETH         Social History - reviewed:  Social History     Socioeconomic History    Marital status:      Spouse name: Not on file    Number of children: Not on file    Years of education: Not on file    Highest education level: Not on file   Occupational History    Not on file   Social Needs    Financial resource strain: Not on file    Food insecurity:     Worry: Not on file     Inability: Not on file    Transportation needs:     Medical: Not on file     Non-medical: Not on file   Tobacco Use    Smoking status: Never Smoker    Smokeless tobacco: Never Used   Substance and Sexual Activity    Alcohol use:  Yes     Alcohol/week: 0.0 - 1.0 standard drinks     Frequency: Never    Drug use: No    Sexual activity: Yes     Partners: Male     Birth control/protection: None   Lifestyle    Physical activity:     Days per week: Not on file     Minutes per session: Not on file    Stress: Not on file   Relationships    Social connections:     Talks on phone: Not on file     Gets together: Not on file     Attends Christianity service: Not on file     Active member of club or organization: Not on file     Attends meetings of clubs or organizations: Not on file     Relationship status: Not on file    Intimate partner violence:     Fear of current or ex partner: Not on file     Emotionally abused: Not on file     Physically abused: Not on file     Forced sexual activity: Not on file   Other Topics Concern    Not on file   Social History Narrative    Not on file         Family History - reviewed:  Family History   Problem Relation Age of Onset    Diabetes Mother     Heart Disease Mother     Hypertension Mother     Arthritis-rheumatoid Mother     Diabetes Father     Heart Disease Father     Cancer Maternal Aunt         bone    Diabetes Sister          Physical Exam  Visit Vitals  /75 (BP 1 Location: Left arm, BP Patient Position: Sitting)   Pulse 77   Temp 98.3 °F (36.8 °C) (Oral)   Resp 20   Ht 5' 3\" (1.6 m)   Wt 185 lb (83.9 kg)   LMP 01/01/2008 (LMP Unknown)   SpO2 98%   BMI 32.77 kg/m²       General appearance - alert, well appearing, and in no distress  Chest - clear to auscultation, no wheezes, rales or rhonchi, symmetric air entry  Heart - normal rate, regular rhythm, normal S1, S2, no murmurs  Extremities - no edema  Psych - normal mood and affect     ECG:  NSR, rate = 86    Assessment/Plan    ICD-10-CM ICD-9-CM    1. Onychomycosis of toenail B35.1 110.1 REFERRAL TO PODIATRY   2. Urinary frequency R35.0 788.41 AMB POC URINALYSIS DIP STICK AUTO W/O MICRO      CULTURE, URINE   3. Chest heaviness R07.89 786.59 AMB POC EKG ROUTINE W/ 12 LEADS, INTER & REP       I have discussed the diagnosis with the patient and the intended plan as seen in the above orders. The patient has received an after-visit summary and questions were answered concerning future plans. I have discussed medication side effects and warnings with the patient as well.       Vance Sanchez MD

## 2019-11-07 LAB
BILIRUB UR QL STRIP: NEGATIVE
GLUCOSE UR-MCNC: NEGATIVE MG/DL
KETONES P FAST UR STRIP-MCNC: NORMAL MG/DL
PH UR STRIP: 5.5 [PH] (ref 4.6–8)
PROT UR QL STRIP: NORMAL
SP GR UR STRIP: 1.02 (ref 1–1.03)
UA UROBILINOGEN AMB POC: NORMAL (ref 0.2–1)
URINALYSIS CLARITY POC: NORMAL
URINALYSIS COLOR POC: YELLOW
URINE BLOOD POC: NEGATIVE
URINE LEUKOCYTES POC: NORMAL
URINE NITRITES POC: NEGATIVE

## 2019-11-08 ENCOUNTER — HOSPITAL ENCOUNTER (OUTPATIENT)
Dept: LAB | Age: 64
Discharge: HOME | End: 2019-11-08

## 2019-11-08 DIAGNOSIS — R35.0 URINARY FREQUENCY: ICD-10-CM

## 2019-11-09 RX ORDER — METFORMIN HYDROCHLORIDE 1000 MG/1
1000 TABLET ORAL 2 TIMES DAILY WITH MEALS
Qty: 180 TAB | Refills: 3 | Status: SHIPPED | OUTPATIENT
Start: 2019-11-09 | End: 2020-02-24 | Stop reason: SDUPTHER

## 2019-11-09 RX ORDER — TRAMADOL HYDROCHLORIDE 50 MG/1
50 TABLET ORAL
Qty: 120 TAB | Refills: 0 | Status: SHIPPED | OUTPATIENT
Start: 2019-11-09 | End: 2019-11-22 | Stop reason: SDUPTHER

## 2019-11-10 LAB
BACTERIA SPEC CULT: NORMAL
CC UR VC: NORMAL
SERVICE CMNT-IMP: NORMAL

## 2019-11-12 NOTE — PROGRESS NOTES
Africa Condon is a 59 y.o. female here for   Chief Complaint   Patient presents with    Diabetes       Functional glucose monitor and record keeping system? -yes   Eye exam within last year? - on file  Foot exam within last year? - on file    1. Have you been to the ER, urgent care clinic since your last visit? Hospitalized since your last visit? -[facility] for endoscopy 8/6/19    2. Have you seen or consulted any other health care providers outside of the [address] [place] [address] since your last visit?   Include any pap smears or colon screening.-no

## 2019-11-13 ENCOUNTER — OFFICE VISIT (OUTPATIENT)
Dept: ENDOCRINOLOGY | Age: 64
End: 2019-11-13

## 2019-11-13 VITALS
HEIGHT: 63 IN | OXYGEN SATURATION: 100 % | DIASTOLIC BLOOD PRESSURE: 84 MMHG | SYSTOLIC BLOOD PRESSURE: 147 MMHG | TEMPERATURE: 97.7 F | HEART RATE: 94 BPM | WEIGHT: 186 LBS | BODY MASS INDEX: 32.96 KG/M2 | RESPIRATION RATE: 16 BRPM

## 2019-11-13 DIAGNOSIS — I10 ESSENTIAL HYPERTENSION: ICD-10-CM

## 2019-11-13 DIAGNOSIS — E78.00 HYPERCHOLESTEROLEMIA: ICD-10-CM

## 2019-11-13 DIAGNOSIS — E11.65 TYPE 2 DIABETES MELLITUS WITH HYPERGLYCEMIA, UNSPECIFIED WHETHER LONG TERM INSULIN USE (HCC): ICD-10-CM

## 2019-11-13 DIAGNOSIS — E11.65 TYPE 2 DIABETES MELLITUS WITH HYPERGLYCEMIA, WITHOUT LONG-TERM CURRENT USE OF INSULIN (HCC): Primary | ICD-10-CM

## 2019-11-13 LAB — HBA1C MFR BLD HPLC: 7.6 %

## 2019-11-13 RX ORDER — BLOOD-GLUCOSE METER
EACH MISCELLANEOUS
Qty: 1 EACH | Refills: 0 | Status: SHIPPED | OUTPATIENT
Start: 2019-11-13

## 2019-11-13 RX ORDER — LANCETS
EACH MISCELLANEOUS
Qty: 100 EACH | Refills: 3 | Status: SHIPPED | OUTPATIENT
Start: 2019-11-13 | End: 2021-09-09 | Stop reason: SDUPTHER
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
## 2019-11-22 DIAGNOSIS — E11.42 DIABETIC POLYNEUROPATHY ASSOCIATED WITH TYPE 2 DIABETES MELLITUS (HCC): ICD-10-CM

## 2019-11-26 RX ORDER — TRAMADOL HYDROCHLORIDE 50 MG/1
TABLET ORAL
Qty: 120 TAB | Refills: 0 | Status: SHIPPED | OUTPATIENT
Start: 2019-11-26 | End: 2020-01-14

## 2020-01-07 DIAGNOSIS — E11.42 DIABETIC POLYNEUROPATHY ASSOCIATED WITH TYPE 2 DIABETES MELLITUS (HCC): ICD-10-CM

## 2020-01-13 NOTE — TELEPHONE ENCOUNTER
[phone]    Patient is asking about this medication request of 1/7 as her appointment of 1/21 has been provider canceled of   Dr. To Sharma. Please advise.

## 2020-01-14 RX ORDER — TRAMADOL HYDROCHLORIDE 50 MG/1
TABLET ORAL
Qty: 120 TAB | Refills: 0 | Status: SHIPPED | OUTPATIENT
Start: 2020-01-14 | End: 2020-02-13

## 2020-01-14 NOTE — TELEPHONE ENCOUNTER
I will fill the prescription for Tramadol 50mg #120 for ONE time only while Dr. Clint Wyatt is out of clinic. The script was sent to the pharmacy. The patient needs to be seen in the clinic for future refills or needs to be sent to the pain specialist.   was personally reviewed and appropriate.  Last refill for Tramadol 50 mg #120 by Dr. Clint Wyatt in November, 2020.  2:07 PM  1/14/2020  Ross Campo MD

## 2020-01-14 NOTE — TELEPHONE ENCOUNTER
Patient is calling about this refill request again today    Patient last saw:  Tuesday, November 05, 2019 03:00 PM 4 [facility]-MAIN OFFICE, SORIN, Acute Care, 30min.   Chest Pressure for a month and 1/2    She has an upcoming apt for:   Wednesday, January 22, 2020 03:00 PM f [facility] OFFICE, [place]AT[place]VAE_PANDA, Medicare Wellness Subsequent, 30min  cancel or reschedule  11/30/2019, [facility] OFFICE, SOR[place], Medicare Wellness Subsequent, [facility] [id number] as of 11/30/19,

## 2020-01-15 ENCOUNTER — OFFICE VISIT (OUTPATIENT)
Dept: FAMILY MEDICINE CLINIC | Age: 65
End: 2020-01-15

## 2020-01-15 VITALS
WEIGHT: 184.2 LBS | RESPIRATION RATE: 16 BRPM | TEMPERATURE: 98.7 F | SYSTOLIC BLOOD PRESSURE: 121 MMHG | HEART RATE: 85 BPM | BODY MASS INDEX: 32.64 KG/M2 | HEIGHT: 63 IN | OXYGEN SATURATION: 99 % | DIASTOLIC BLOOD PRESSURE: 78 MMHG

## 2020-01-15 DIAGNOSIS — M19.011 ARTHRITIS OF RIGHT SHOULDER REGION: Primary | ICD-10-CM

## 2020-01-15 RX ORDER — TRIAMCINOLONE ACETONIDE 40 MG/ML
40 INJECTION, SUSPENSION INTRA-ARTICULAR; INTRAMUSCULAR ONCE
Qty: 1 ML | Refills: 0
Start: 2020-01-15 | End: 2020-01-15

## 2020-01-15 RX ORDER — LIDOCAINE HYDROCHLORIDE 10 MG/ML
3 INJECTION INFILTRATION; PERINEURAL ONCE
Qty: 3 ML | Refills: 0
Start: 2020-01-15 | End: 2020-01-15

## 2020-01-15 NOTE — PROGRESS NOTES
Chief Complaint   Patient presents with    Shoulder Pain     right shoulder x 3 months  patient describes right shoulder pain as sharp and achy      Blood pressure 121/78, pulse 85, temperature 98.7 °F (37.1 °C), temperature source Oral, resp. rate 16, height 5' 3\" (1.6 m), weight 184 lb 3.2 oz (83.6 kg), last menstrual period 01/01/2008, SpO2 99 %. 1. Have you been to the ER, urgent care clinic since your last visit? Hospitalized since your last visit? No    2. Have you seen or consulted any other health care providers outside of the [address] [place] [address] since your last visit? Include any pap smears or colon screening.  No

## 2020-01-15 NOTE — PROGRESS NOTES
History of Present Illness     Chief Complaint   Patient presents with    Shoulder Pain     right shoulder x 3 months  patient describes right shoulder pain as sharp and achy        Patient Identification  Marlyn Hartmann is a 59 y.o. female complains of pain in the right shoulder pain. Date of Onset:  3 months ago, before Stella  Mechanism of Injury: No JOON  Alleviating Factors: Raising arm  Aggravating Factors: Worse at night laying down    Imaging: XR reviewed 3/7/2019    Subacromial injection on 9/11/2019. Glenohumeral joint injection on 7/3/2019. States the [address] joint injection was better and lasted longer. Last did PT for shoulder last year. States this didn't help. Does not want surgery. Past Medical History:   Diagnosis Date    Diabetes ([facility] 1995    Genital herpes simplex type 2     GERD (gastroesophageal reflux disease)     no medication prescribed    Hypercholesterolemia     Hypertension     Narcolepsy     Neuropathy in diabetes ([facility]     Obesity (BMI 30.0-34. 5)      Family History   Problem Relation Age of Onset    Diabetes Mother     Heart Disease Mother     Hypertension Mother    [facility] Arthritis-rheumatoid Mother     Diabetes Father     Heart Disease Father     Cancer Maternal Aunt         bone    Diabetes Sister      Current Outpatient Medications   Medication Sig Dispense Refill    triamcinolone acetonide (KENALOG) 40 mg/mL injection 1 mL by IntraBURSal route once for 1 dose. 1 mL 0    lidocaine (XYLOCAINE) 10 mg/mL (1 %) injection 3 mL by IntraBURSal route once for 1 dose.  3 mL 0    traMADol (ULTRAM) 50 mg tablet TAKE 2 TABLETS BY MOUTH EVERY MORNING AND 1 TABLET EVERY AFTERNOON, AND 1 TABLET IN THE EVENING AS NEEDED FOR PAIN 120 Tab 0    Blood-Glucose Meter (ACCU-CHEK GEORGINA PLUS METER) misc Test once daily Dx Code: E11.65 1 Each 0    glucose blood VI test strips (ACCU-CHEK GEORGINA PLUS TEST STRP) strip Test once daily Dx Code: E11.65 100 Strip 3    lancets (ACCU-CHEK FASTCLIX LANCET DRUM) misc Test once daily Dx Code: E11.65 100 Each 3    metFORMIN (GLUCOPHAGE) 1,000 mg tablet Take 1 Tab by mouth two (2) times daily (with meals). 180 Tab 3    rosuvastatin (CRESTOR) 10 mg tablet TAKE 1 TABLET BY MOUTH DAILY 90 Tab 3    amLODIPine-benazepril (LOTREL) 10-20 mg per capsule Take 1 Cap by mouth daily. 90 Cap 3    hydroCHLOROthiazide (HYDRODIURIL) 25 mg tablet TAKE 1 TABLET BY MOUTH DAILY 90 Tab 3    peg 400-propylene glycol (SYSTANE, PROPYLENE GLYCOL,) 0.4-0.3 % drop Administer 1 Drop to both eyes as needed.  cyanocobalamin (VITAMIN B12) 100 mcg tablet Take 100 mcg by mouth daily.  nystatin (MYCOSTATIN) topical cream Apply  to affected area two (2) times a day. 60 g 1    glipiZIDE (GLUCOTROL) 10 mg tablet TAKE 1 TABLET BY MOUTH TWICE DAILY 180 Tab 3    omega-3 fatty acids-vitamin e (FISH OIL) 1,000 mg Cap Take 1 Cap by mouth daily.  dulaglutide (TRULICITY) 1.5 CK/5.3 mL sub-q pen 0.5 mL by SubCUTAneous route every seven (7) days. 12 Syringe 3     Allergies   Allergen Reactions    Aspirin Hives     Tolerates ibuprofen and naproxen    Clindamycin Rash and Hives       Review of Systems  A comprehensive review of systems was negative except for that written in the HPI. Physical Exam     Visit Vitals  /78   Pulse 85   Temp 98.7 °F (37.1 °C) (Oral)   Resp 16   Ht 5' 3\" (1.6 m)   Wt 184 lb 3.2 oz (83.6 kg)   LMP 01/01/2008 (LMP Unknown)   SpO2 99%   BMI 32.63 kg/m²       GEN: Well appearing. No apparent distress. Responds to all questions appropriately. Lungs: No labored respirations. Talking in  complete sentences without difficulty.     Shoulder: Right    Deformity: None    ROM:  Forward Flexion: Active: 160 Passive: 160  ER at 90 degrees abduction: Active: 50 Passive:60  IR at 90 degrees abduction: Active: 50 Passive:60      Palpation:  AC tenderness: None  SC tenderness: None  Clavicle tenderness: None  Biceps tenderness: None    Strength:  Empty Can(Supraspinatus): Left:5/5 Right:5/5  External rotation(Infraspinatus): Left:5/5 Right: 5/5  Lift off(Subscapularis): Left:5/5 Right: 5/5    Rotator Cuff Exam:  Neers sign: Positive  Cruz sign: Positive  Painful Arc: Positive  Lift-off sign / Belly Press: Negative    Neuro/Vascular:  Pulses intact, no edema, and neurologically intact  Skin: No obvious rash or skin breakdown       [facility]  OFFICE PROCEDURE PROGRESS NOTE        Chart reviewed for the following:   Kyleigh Araiza MD, have reviewed the History, Physical and updated the Allergic reactions for Paul Mariscal     TIME OUT performed immediately prior to start of procedure:   I, Ishaan Garvey MD, have performed the following reviews on Nguyen Vargas prior to the start of the procedure:            * Patient was identified by name and date of birth   * Agreement on procedure being performed was verified  * Risks and Benefits explained to the patient  * Procedure site verified and marked as necessary  * Patient was positioned for comfort  * Consent was signed and verified     Time: 4:45pm      Date of procedure: 1/15/2020    Procedure performed by:  Ishaan Garvey MD    Provider assisted by: Sierra Hartman LPN    Patient assisted by: self    How tolerated by patient: tolerated the procedure well with no complications    Post Procedural Pain Scale: 0 - No Hurt      Indications:   Symptom relief from osteoarthritis and Diagnostic    Ultrasound Guided Right GH Joint Injection    Treatment options discussed with patient at length, including risks, benefits, alternatives, and the nature of any potential procedures for the problem.     Using the [employer] system, I performed a MSK US guided aspiration and/or injection of the above target via an in-plane approach after Chlorhexidine prep and needle localization with the linear Probe using a 22G needle, 0 cc of fluid and, injecting 40 mg Kenalog and 3 ml Lidocaine 1% w/o Epi. Pt reported 100 percent relief of symptoms after injection. The injection was performed for diagnostic and prognostic purposes. Ultrasound Performed and Interpreted by:  Sugar Ellis MD, CAQSM, RMSK      No results found for any visits on 01/15/20. Assessment:    ICD-10-CM ICD-9-CM    1. Arthritis of right shoulder region M19.011 716.91 TRIAMCINOLONE ACETONIDE INJ      triamcinolone acetonide (KENALOG) 40 mg/mL injection      lidocaine (XYLOCAINE) 10 mg/mL (1 %) injection      AMB POS US DRAIN/INJECT LARGE JOINT/BURSA      REFERRAL TO PHYSICAL THERAPY       Plan:  1. Patient encounter was at least 25 minutes with more than 50 percent of the visit involved in counseling. Restart Physical Therapy. Improved with injection. It has been 6 months since last injection. Discussed that if she continues to have pain I recommend orthopedic surgery. After Care Instructions: The patient is asked to continue to rest the joint for a few more days before resuming regular activities. It may be more painful for the first 1-2 days. Watch for fever, or increased swelling or persistent pain in the joint. Call or return to clinic prn if such symptoms occur or there is failure to improve as anticipated.

## 2020-01-17 NOTE — TELEPHONE ENCOUNTER
Vilma message of 4:50 pm of yesterday. Dr. Debra Carrion: [facility] [address] Office   Phone Number: [phone]               Caller's first and last name: n/a   Reason for call: Patient stated that she did a medication refill message 72 hours ago for \"Tramadol\", and she has not heard anything back yet. Patient stated that she has now been out of her medication for 1 day.    Callback required yes/no and why: yes   Best contact number(s): [phone]   Details to clarify the request: n/a

## 2020-01-17 NOTE — TELEPHONE ENCOUNTER
Patient called for status of med refill. Informed refill addressed. Patient states if doesn't make sense that nobody even bothered to call to let her know.     She notes she already has appt 1/22/20 and can have further refills addressed at that time

## 2020-02-24 ENCOUNTER — OFFICE VISIT (OUTPATIENT)
Dept: FAMILY MEDICINE CLINIC | Age: 65
End: 2020-02-24

## 2020-02-24 VITALS
OXYGEN SATURATION: 96 % | TEMPERATURE: 98.6 F | HEIGHT: 63 IN | BODY MASS INDEX: 33.31 KG/M2 | DIASTOLIC BLOOD PRESSURE: 76 MMHG | WEIGHT: 188 LBS | SYSTOLIC BLOOD PRESSURE: 122 MMHG | RESPIRATION RATE: 20 BRPM | HEART RATE: 99 BPM

## 2020-02-24 DIAGNOSIS — E11.65 TYPE 2 DIABETES MELLITUS WITH HYPERGLYCEMIA, UNSPECIFIED WHETHER LONG TERM INSULIN USE (HCC): ICD-10-CM

## 2020-02-24 DIAGNOSIS — G89.4 CHRONIC PAIN SYNDROME: ICD-10-CM

## 2020-02-24 DIAGNOSIS — E78.00 HYPERCHOLESTEROLEMIA: ICD-10-CM

## 2020-02-24 DIAGNOSIS — E11.65 TYPE 2 DIABETES MELLITUS WITH HYPERGLYCEMIA, WITHOUT LONG-TERM CURRENT USE OF INSULIN (HCC): ICD-10-CM

## 2020-02-24 DIAGNOSIS — Z12.31 ENCOUNTER FOR SCREENING MAMMOGRAM FOR MALIGNANT NEOPLASM OF BREAST: ICD-10-CM

## 2020-02-24 DIAGNOSIS — Z00.00 MEDICARE ANNUAL WELLNESS VISIT, SUBSEQUENT: Primary | ICD-10-CM

## 2020-02-24 DIAGNOSIS — I10 ESSENTIAL HYPERTENSION WITH GOAL BLOOD PRESSURE LESS THAN 130/80: ICD-10-CM

## 2020-02-24 DIAGNOSIS — Z13.31 SCREENING FOR DEPRESSION: ICD-10-CM

## 2020-02-24 DIAGNOSIS — Z12.39 SCREENING FOR BREAST CANCER: ICD-10-CM

## 2020-02-24 RX ORDER — AMMONIUM LACTATE 12 G/100G
LOTION TOPICAL
COMMUNITY
Start: 2020-02-14

## 2020-02-24 RX ORDER — TRAMADOL HYDROCHLORIDE 50 MG/1
50 TABLET ORAL
Qty: 60 TAB | Refills: 0 | Status: SHIPPED | OUTPATIENT
Start: 2020-02-24 | End: 2020-03-09

## 2020-02-24 RX ORDER — DULOXETIN HYDROCHLORIDE 30 MG/1
CAPSULE, DELAYED RELEASE ORAL
COMMUNITY
Start: 2019-12-18 | End: 2021-09-09

## 2020-02-24 RX ORDER — AMLODIPINE AND BENAZEPRIL HYDROCHLORIDE 10; 20 MG/1; MG/1
1 CAPSULE ORAL DAILY
Qty: 90 CAP | Refills: 3 | Status: SHIPPED | OUTPATIENT
Start: 2020-02-24 | End: 2021-02-08 | Stop reason: SDUPTHER

## 2020-02-24 RX ORDER — GABAPENTIN 800 MG/1
TABLET ORAL
COMMUNITY
Start: 2020-02-16 | End: 2020-05-12

## 2020-02-24 RX ORDER — ROSUVASTATIN CALCIUM 10 MG/1
TABLET, COATED ORAL
Qty: 90 TAB | Refills: 3 | Status: SHIPPED | OUTPATIENT
Start: 2020-02-24 | End: 2021-02-08 | Stop reason: SDUPTHER

## 2020-02-24 RX ORDER — METFORMIN HYDROCHLORIDE 1000 MG/1
1000 TABLET ORAL 2 TIMES DAILY WITH MEALS
Qty: 180 TAB | Refills: 3 | Status: SHIPPED | OUTPATIENT
Start: 2020-02-24 | End: 2020-05-12 | Stop reason: SDUPTHER

## 2020-02-24 RX ORDER — FAMOTIDINE 40 MG/1
40 TABLET, FILM COATED ORAL DAILY
Qty: 30 TAB | Refills: 3 | Status: SHIPPED | OUTPATIENT
Start: 2020-02-24 | End: 2020-09-17

## 2020-02-24 RX ORDER — HYDROCHLOROTHIAZIDE 25 MG/1
TABLET ORAL
Qty: 90 TAB | Refills: 3 | Status: SHIPPED | OUTPATIENT
Start: 2020-02-24 | End: 2021-02-08 | Stop reason: SDUPTHER

## 2020-02-24 RX ORDER — GLIPIZIDE 10 MG/1
TABLET ORAL
Qty: 180 TAB | Refills: 3 | Status: SHIPPED | OUTPATIENT
Start: 2020-02-24 | End: 2020-05-12 | Stop reason: SDUPTHER

## 2020-02-24 RX ORDER — UREA 10 %
100 LOTION (ML) TOPICAL DAILY
Qty: 90 TAB | Refills: 3 | Status: SHIPPED | OUTPATIENT
Start: 2020-02-24 | End: 2021-09-09

## 2020-02-24 NOTE — PROGRESS NOTES
Identified Patient with two Patient identifiers (Name and ). Two Patient Identifiers confirmed. Reviewed record in preparation for visit and have obtained necessary documentation. Chief Complaint   Patient presents with   [facility] Annual Wellness Visit       Visit Vitals  /76 (BP 1 Location: Right arm, BP Patient Position: Sitting)   Pulse 99   Temp 98.6 °F (37 °C) (Oral)   Resp 20   Ht 5' 3\" (1.6 m)   Wt 188 lb (85.3 kg)   SpO2 96%   BMI 33.30 kg/m²       1. Have you been to the ER, urgent care clinic since your last visit? Hospitalized since your last visit? No    2. Have you seen or consulted any other health care providers outside of the [address] [place] [address] since your last visit? Include any pap smears or colon screening.  No

## 2020-02-24 NOTE — PROGRESS NOTES
[address]  [address] [address] Gladys Copeland   [phone]             Date of visit: 2/24/2020       This is an Subsequent Medicare Annual Wellness Visit (AWV), (Performed more than 12 months after effective date of Medicare Part B enrollment and 12 months after last preventive visit, Once in a lifetime)    I have reviewed the patient's medical history in detail and updated the computerized patient record. History obtained from: the patient    Concerns today   (Patient understands that medical problems addressed today may incur additional cost as this is a preventive visit)  -No acute concerns.  -the patient wants to follow up on her chronic medical conditions    Diabetes  Poorly controlled, managed by endocrinologist. She is not compliant with her diet. Current medications: Trulicity, Metformin and Glipizide. Denies polyuria, polydipsia. Hyperlipidemia  Stable. Takes statin, no side effects. Due for lipid check. Chronic pain  Bilateral leg pain 2/2 peripheral neuropathy. She takes Tramadol 50 mg every 8 hours as needed for pain. She is followed by the podiatrist who is prescribing Gabapentin 800 mg, she takes it only at night. She denies any dizziness or drowsiness with the medication use. GERD  She reports having some epigastric burning  After food intake. No abdominal pain, no vomiting, no nausea.  Never tried any medications    History     Patient Active Problem List   Diagnosis Code    Hypertension I10    Diabetes ([address] E11.9    Hypercholesterolemia E78.00    Other chest pain R07.89    Neuropathy due to secondary diabetes ([address] E13.40    Lumbar stenosis M48.061    Lumbar herniated disc M51.26    Postcoital UTI N39.0    Type 2 diabetes mellitus with diabetic nephropathy, without long-term current use of insulin (HCC) E11.21    Type 2 diabetes mellitus with hyperglycemia, without long-term current use of insulin (HCC) E11.65    Closed nondisplaced fracture of first cervical vertebra (HCC) S12.001A    Closed fracture of multiple ribs of right side with routine healing S22.41XD    Type 2 diabetes mellitus with diabetic neuropathy (HCC) E11.40    HSV-2 infection B00.9    Breast cancer screening Z12.39    Cervical cancer screening Z12.4    Obesity (BMI 30.0-34. 9) E66.9    Shingles rash B02.9    S/P excision of lipoma Z98.890, Z86.018    Severe obesity ([address] E66.01     Past Medical History:   Diagnosis Date    [facility]     Genital herpes simplex type 2     GERD (gastroesophageal reflux disease)     no medication prescribed    Hypercholesterolemia     Hypertension     Narcolepsy     Neuropathy in diabetes ([facility]     Obesity (BMI 30.0-34. 9)       Past Surgical History:   Procedure Laterality Date    COLONOSCOPY N/A 2019    COLONOSCOPY performed by Jae Agudelo MD at [facility] HX  SECTION  03/15/1979    HX  SECTION  1990    HX KNEE REPLACEMENT Bilateral     HX MOHS PROCEDURES Right     HX OTHER SURGICAL Right 2019    Excision of lipoma of the right buttock.  HX ROTATOR CUFF REPAIR Right     KS EXTRAC ERUPTED TOOTH/EXPOSED ROOT Right 14    3 UPPER RIGHT SIDE BACK TEETH     Allergies   Allergen Reactions    Aspirin Hives     Tolerates ibuprofen and naproxen    Clindamycin Rash and Hives     Current Outpatient Medications   Medication Sig Dispense Refill    gabapentin (NEURONTIN) 800 mg tablet TK 1 T PO Q 8 HOURS      ammonium lactate (LAC-HYDRIN) 12 % lotion JEMAL EXT TO ALL SURFACES OF BOTH FEET BID      dulaglutide (TRULICITY) 1.5 SA/0.5 mL sub-q pen 0.5 mL by SubCUTAneous route every seven (7) days. 12 Syringe 3    metFORMIN (GLUCOPHAGE) 1,000 mg tablet Take 1 Tab by mouth two (2) times daily (with meals).  180 Tab 3    rosuvastatin (CRESTOR) 10 mg tablet TAKE 1 TABLET BY MOUTH DAILY 90 Tab 3    amLODIPine-benazepril (LOTREL) 10-20 mg per capsule Take 1 Cap by mouth daily. 90 Cap 3    hydroCHLOROthiazide (HYDRODIURIL) 25 mg tablet TAKE 1 TABLET BY MOUTH DAILY 90 Tab 3    cyanocobalamin (VITAMIN B12) 100 mcg tablet Take 1 Tab by mouth daily. 90 Tab 3    glipiZIDE (GLUCOTROL) 10 mg tablet TAKE 1 TABLET BY MOUTH TWICE DAILY 180 Tab 3    traMADol (ULTRAM) 50 mg tablet Take 1 Tab by mouth every eight (8) hours as needed for Pain for up to 15 doses. Max Daily Amount: 150 mg. 60 Tab 0    varicella-zoster recombinant, PF, (SHINGRIX, PF,) 50 mcg/0.5 mL susr injection 0.5mL by IntraMUSCular route once now and then repeat in 2-6 months 0.5 mL 1    naloxone (NARCAN) 2 mg/actuation spry Use 1 spray intranasally, then discard. Repeat with new spray every 2 min as needed for opioid overdose symptoms, alternating nostrils. 1 Vial 1    famotidine (PEPCID) 40 mg tablet Take 1 Tab by mouth daily. 30 Tab 3    Blood-Glucose Meter (ACCU-CHEK GEORGINA PLUS METER) misc Test once daily Dx Code: E11.65 1 Each 0    glucose blood VI test strips (ACCU-CHEK GEORGINA PLUS TEST STRP) strip Test once daily Dx Code: E11.65 100 Strip 3    lancets (ACCU-CHEK FASTCLIX LANCET DRUM) misc Test once daily Dx Code: E11.65 100 Each 3    peg 400-propylene glycol (SYSTANE, PROPYLENE GLYCOL,) 0.4-0.3 % drop Administer 1 Drop to both eyes as needed.  nystatin (MYCOSTATIN) topical cream Apply  to affected area two (2) times a day. 60 g 1    omega-3 fatty acids-vitamin e (FISH OIL) 1,000 mg Cap Take 1 Cap by mouth daily.  DULoxetine (CYMBALTA) 30 mg capsule TK ONE C PO  Q 12 H       Family History   Problem Relation Age of Onset    Diabetes Mother     Heart Disease Mother     Hypertension Mother    [de-identified] Arthritis-rheumatoid Mother     Diabetes Father     Heart Disease Father     Cancer Maternal Aunt         bone    Diabetes Sister      Social History     Tobacco Use    Smoking status: Never Smoker    Smokeless tobacco: Never Used   Substance Use Topics    Alcohol use:  Yes     Alcohol/week: 0.0 - 1.0 standard drinks     Frequency: Never       Specialists/Care Team   Paul Cano Klarissafilomena has established care with the following healthcare providers:  -Dr. Sofiya Corrigan  -Dr. Lilliam Anderson specialist   -Dr. Alicia Brown -PCP  -Dr. Marizol Watkins   female  59 y.[address]. [address] Questions   -During the past 4 weeks:   -how would you rate your health in general? Fair   -how often have you been bothered by feeling dizzy when standing up? 2 times a week   -how much have you been bothered by bodily pain? severely   -Have you noticed any hearing difficulties? no   -has your physical and emotional health limited your social activities with family or friends? no    Emotional Health Questions   -Do you have a history of depression, anxiety, or emotional problems? no  -Over the past 2 weeks, have you felt down, depressed or hopeless? no  -Over the past 2 weeks, have you felt little interest or pleasure in doing things? no    Health Habits   Please describe your diet habits: fair  Do you get 5 servings of fruits or vegetables daily? yes and Sometimes  Do you exercise regularly? yes    Activities of Daily Living and Functional Status   -Do you need help with eating, walking, dressing, bathing, toileting, the phone, transportation, shopping, preparing meals, housework, laundry, medications or managing money? no  -In the past four weeks, was someone available to help you if you needed and wanted help with anything? yes  -Are you confident are you that you can control and manage most of your health problems? yes  -Have you been given information to help you keep track of your medications? yes  -How often do you have trouble taking your medications as prescribed? never    Fall Risk and Home Safety   Have you fallen 2 or more times in the past year?  no  Does your home have rugs in the hallway, lack grab bars in the bathroom, lack handrails on the stairs or have poor lighting? yes  Do you have smoke detectors and check them regularly? yes  Do you have difficulties driving a car? no  Do you always fasten your seat belt when you are in a car? yes    Review of Systems (if indicated for problems addressed today)   Review of Systems   Respiratory: Negative for cough and shortness of breath. Cardiovascular: Negative for chest pain, palpitations and leg swelling. Gastrointestinal: Positive for heartburn. Negative for abdominal pain and vomiting. Musculoskeletal: Positive for joint pain. Physical Examination     Vitals:    02/24/20 1057   BP: 122/76   Pulse: 99   Resp: 20   Temp: 98.6 °F (37 °C)   TempSrc: Oral   SpO2: 96%   Weight: 188 lb (85.3 kg)   Height: 5' 3\" (1.6 m)     Body mass index is 33.3 kg/m². No exam data present  Was the patient's timed Up & Go test unsteady or longer than 30 seconds? no    Evaluation of Cognitive Function   Mood/affect:  happy  Orientation: Person, Place, Time and Situation  Appearance: age appropriate    Additional exam if indicated for problems addressed today:  Physical Exam  Constitutional:       Appearance: Normal appearance. Neck:      Musculoskeletal: Normal range of motion and neck supple. Cardiovascular:      Rate and Rhythm: Normal rate and regular rhythm. Heart sounds: No murmur. Pulmonary:      Effort: Pulmonary effort is normal.      Breath sounds: Normal breath sounds. No wheezing. Abdominal:      General: Abdomen is flat. Bowel sounds are normal.      Palpations: Abdomen is soft. Neurological:      Mental Status: She is alert.            Advice/Referrals/Counseling (as indicated)   Education and counseling provided for any problems identified above: NA    Preventive Services     (Preventive care checklist to be included in patient instructions)  Discussed today Done Previously Not Needed     X  Pneumococcal vaccines    X  Flu vaccine     X Hepatitis B vaccine (if at risk)   X Shingles vaccine    X  TDAP vaccine   X   Mammogram   X   Pap smear    X  Colorectal cancer screening     X Low-dose CT for lung cancer screening     X Bone density test    X  Glaucoma screening   X   Cholesterol test   X   Diabetes screening test    X   Diabetes self-management class     X Nutritionist referral for diabetes or renal disease     Discussion of Advance Directive   Discussed with Paul Chicas Goes her ability to prepare and advance directive in the case that an injury or illness causes her to be unable to make health care decisions. ACP is on file    Assessment/Plan   V70.0    ICD-10-CM ICD-9-CM    1. Screening for breast cancer Z12.39 V76.10    2. Type 2 diabetes mellitus with hyperglycemia, unspecified whether long term insulin use (HCC) E11.65 250.00 dulaglutide (TRULICITY) 1.5 JX/2.4 mL sub-q pen   3. Type 2 diabetes mellitus with hyperglycemia, without long-term current use of insulin (HCC) E11.65 250.00 metFORMIN (GLUCOPHAGE) 1,000 mg tablet     790.29    4. Hypercholesterolemia E78.00 272.0 rosuvastatin (CRESTOR) 10 mg tablet   5. Essential hypertension with goal blood pressure less than 130/80 I10 401.9 amLODIPine-benazepril (LOTREL) 10-20 mg per capsule      hydroCHLOROthiazide (HYDRODIURIL) 25 mg tablet   6. Medicare annual wellness visit, subsequent Z00.00 V70.0    7. Chronic pain syndrome G89.4 338.4 REFERRAL TO PAIN MANAGEMENT      traMADol (ULTRAM) 50 mg tablet      naloxone (NARCAN) 2 mg/actuation spry   8. Screening for depression Z13.31 V79.0 Damien Grimes   9.  Encounter for screening mammogram for malignant neoplasm of breast Z12.31 V76.12 BRADEN MAMMO BI SCREENING INCL CAD       Orders Placed This Encounter    Depression Screen Annual    Bilateral Digital Screening Mammography    REFERRAL TO PAIN MANAGEMENT    gabapentin (NEURONTIN) 800 mg tablet    DULoxetine (CYMBALTA) 30 mg capsule    ammonium lactate (LAC-HYDRIN) 12 % lotion    dulaglutide (TRULICITY) 1.5 mg/0.5 mL sub-q pen    metFORMIN (GLUCOPHAGE) 1,000 mg tablet    rosuvastatin (CRESTOR) 10 mg tablet    amLODIPine-benazepril (LOTREL) 10-20 mg per capsule    hydroCHLOROthiazide (HYDRODIURIL) 25 mg tablet    cyanocobalamin (VITAMIN B12) 100 mcg tablet    glipiZIDE (GLUCOTROL) 10 mg tablet    traMADol (ULTRAM) 50 mg tablet    varicella-zoster recombinant, PF, (SHINGRIX, PF,) 50 mcg/0.5 mL susr injection    naloxone (NARCAN) 2 mg/actuation spry    famotidine (PEPCID) 40 mg tablet       1. Medicare wellness. The patient is up-to-date on all the measures. · Mammo is ordered for June, 2020  · Script for Shingles vaccine was given     2. Hyperlipidemia. Stable. Continue statin. She will come back for lipid check in 1 month. 3. Diabetes. Non insulin dependant. She is followed by her endocrinologist, follow up as scheduled in 1 month. She want to repeat HgA1C in 1 month. 4. Chronic leg pain. On Tramadol. The patient on very high dose of Tramadol and Gabapentin. She denies any side effects of medication. Is being compliant. The short script of Tramadol 50mg #60 was given to the patient until she can make an appointment with  Pain specialist, contact information was provided. The patient was informed that she will no longer be given Tramadol in our clinic and needs to be seen by pain specialist for her pain management. She expressed understanding and agreed to see the specialist.  was reviewed and appropriate. Script for Narcan was given given the high amount of Tramadol and Gabapentin use. UDS is on file. Gabapentin is filled by her podiatrist.   5. GERD. Trial of Pepcid.      Joshua Mathews MD

## 2020-02-24 NOTE — PATIENT INSTRUCTIONS
Pain Specialists    1)[facility] - Daniel Lindsey MD  Address: [address] Tovar, [address] [facility]  Phone: [phone]       2) Dr. Mimi Colón [facility] [address]  [phone]    3) Dr Herbert Walters, [address]  [phone]    4) Dr Katerina Cortez, [address] [place] [address]  [phone]  Fax [phone]    5) Dr. Elkin Teixeira [facility])  Interventional Spine and Pain Management  [phone]    6) Dr. Lucas Walters, [address]  [phone]  Fax [phone]    7) Dr Robinson SILVER [address], [address] [place] [address]  [phone]  Fax [phone]    8) Dr. Jomar Miller and Dr. Gary Duval Specialists  [facility])  [phone]    9) Nany Webb [facility] [place] [place]  [phone]    10) Dr. Rafa Valdes [facility])  [facility]  [address] [facility], [address] [facility]  [phone]  Fax [phone]     11) Sherryle Chard Dr. Sherrian Kays, [facility] access line [phone]    12) Dr Debra Mason (Biofeedback)  [phone]    Free clinics:  1)  [facility] [address] [facility]  [address] [facility] [address] [facility] [address]  [phone], [phone]. [phone]    2)  [facility] [address] [place] [address].  [facility], [address]  [phone]     3)  \"ACCESS NOW\" program for community specialists      Discussed today Done Previously Not Needed     X  Pneumococcal vaccines    X  Flu vaccine     X Hepatitis B vaccine (if at risk)   X   Shingles vaccine    X  TDAP vaccine   X   Mammogram   X   Pap smear    X  Colorectal cancer screening     X Low-dose CT for lung cancer screening     X Bone density test    X  Glaucoma screening   X   Cholesterol test   X   Diabetes screening test X   Diabetes self-management class     X Nutritionist referral for diabetes or renal disease     Medicare Wellness Visit, Female     The best way to live healthy is to have a lifestyle where you eat a well-balanced diet, exercise regularly, limit alcohol use, and quit all forms of tobacco/nicotine, if applicable. Regular preventive services are another way to keep healthy. Preventive services (vaccines, screening tests, monitoring & exams) can help personalize your care plan, which helps you manage your own care. Screening tests can find health problems at the earliest stages, when they are easiest to treat. [facility] follows the current, evidence-based guidelines published by the [facility] Rogelio Yadira ([facility]) when recommending preventive services for our patients. Because we follow these guidelines, sometimes recommendations change over time as research supports it. (For example, mammograms used to be recommended annually. Even though Medicare will still pay for an annual mammogram, the newer guidelines recommend a mammogram every two years for women of average risk). Of course, you and your doctor may decide to screen more often for some diseases, based on your risk and your co-morbidities (chronic disease you are already diagnosed with). Preventive services for you include:  - Medicare offers their members a free annual wellness visit, which is time for you and your primary care provider to discuss and plan for your preventive service needs. Take advantage of this benefit every year!  -All adults over the age of 72 should receive the recommended pneumonia vaccines. Current USPSTF guidelines recommend a series of two vaccines for the best pneumonia protection.   -All adults should have a flu vaccine yearly and a tetanus vaccine every 10 years.   -All adults age 48 and older should receive the shingles vaccines (series of two vaccines).       -All adults age 40-70 who are overweight should have a diabetes screening test once every three years.   -All adults born between [birth year] and 1965 should be screened once for Hepatitis C.  -Other screening tests and preventive services for persons with diabetes include: an eye exam to screen for diabetic retinopathy, a kidney function test, a foot exam, and stricter control over your cholesterol.   -Cardiovascular screening for adults with routine risk involves an electrocardiogram (ECG) at intervals determined by your doctor.   -Colorectal cancer screenings should be done for adults age 54-65 with no increased risk factors for colorectal cancer. There are a number of acceptable methods of screening for this type of cancer. Each test has its own benefits and drawbacks. Discuss with your doctor what is most appropriate for you during your annual wellness visit. The different tests include: colonoscopy (considered the best screening method), a fecal occult blood test, a fecal DNA test, and sigmoidoscopy.    -A bone mass density test is recommended when a woman turns 65 to screen for osteoporosis. This test is only recommended one time, as a screening. Some providers will use this same test as a disease monitoring tool if you already have osteoporosis. -Breast cancer screenings are recommended every other year for women of normal risk, age 54-69.  -Cervical cancer screenings for women over age 72 are only recommended with certain risk factors.      Here is a list of your current Health Maintenance items (your personalized list of preventive services) with a due date:  Health Maintenance Due   Topic Date Due    Shingles Vaccine (1 of 2) 10/30/2005    Colon Cancer Stool Test  10/30/2005    Albumin Urine Test  05/25/2019    Flu Vaccine  08/01/2019    Annual Well Visit  11/30/2019

## 2020-03-10 ENCOUNTER — TELEPHONE (OUTPATIENT)
Dept: FAMILY MEDICINE CLINIC | Age: 65
End: 2020-03-10

## 2020-03-10 NOTE — TELEPHONE ENCOUNTER
Dr. Negrete/Telephone   Received:  Today   Message Contents   Jackeline Leisure [facility] Front Office             Caller's first and last name: Breanna Goldstein   Reason for call:   pt needs MRI results from [facility] faxed over to Deja Arrington at [facility] for appt tomorrow at 2:30 pm. Their office is [phone] required yes/no and why: yes   Best contact number(s):  [phone]   Details to clarify the request:

## 2020-03-10 NOTE — TELEPHONE ENCOUNTER
----- Message from Vince Self sent at 3/10/2020 12:16 PM EDT -----  Regarding: Dr. Petey Corbett (first and last name if not the patient or from practice):  Caller's relationship to patient (if not from a practice):  Name of caller (if calling from a practice):  Patient insurance [facility] Medicare & Medicaid  Name of practice: [facility]  Specialist's title, first, and last name: Dr. Dwayne Foreman  Specialist Type: pain specialist  Office Phone Number: [phone]  Fax number:  Date and time of appointment: 3/11/2020 at 2:30  Reason for appointment: back pain. Details to clarify the request: pt best call back number is [phone].

## 2020-03-13 ENCOUNTER — APPOINTMENT (OUTPATIENT)
Dept: CT IMAGING | Age: 65
End: 2020-03-13
Attending: PHYSICIAN ASSISTANT
Payer: MEDICARE

## 2020-03-13 ENCOUNTER — HOSPITAL ENCOUNTER (EMERGENCY)
Age: 65
Discharge: HOME | End: 2020-03-13
Attending: STUDENT IN AN ORGANIZED HEALTH CARE EDUCATION/TRAINING PROGRAM
Payer: MEDICARE

## 2020-03-13 VITALS
HEART RATE: 77 BPM | WEIGHT: 188 LBS | BODY MASS INDEX: 33.31 KG/M2 | RESPIRATION RATE: 14 BRPM | DIASTOLIC BLOOD PRESSURE: 82 MMHG | HEIGHT: 63 IN | TEMPERATURE: 98.6 F | OXYGEN SATURATION: 98 % | SYSTOLIC BLOOD PRESSURE: 139 MMHG

## 2020-03-13 DIAGNOSIS — R79.89 ELEVATED SERUM CREATININE: ICD-10-CM

## 2020-03-13 DIAGNOSIS — E87.6 HYPOKALEMIA: ICD-10-CM

## 2020-03-13 DIAGNOSIS — R22.0 LEFT FACIAL SWELLING: Primary | ICD-10-CM

## 2020-03-13 LAB
ALBUMIN SERPL-MCNC: 3.8 G/DL (ref 3.5–5)
ALBUMIN/GLOB SERPL: 0.9 {RATIO} (ref 1.1–2.2)
ALP SERPL-CCNC: 107 U/L (ref 45–117)
ALT SERPL-CCNC: 33 U/L (ref 12–78)
AMYLASE SERPL-CCNC: 123 U/L (ref 25–115)
ANION GAP SERPL CALC-SCNC: 6 MMOL/L (ref 5–15)
AST SERPL-CCNC: 23 U/L (ref 15–37)
BASOPHILS # BLD: 0.1 K/UL (ref 0–0.1)
BASOPHILS NFR BLD: 1 % (ref 0–1)
BILIRUB SERPL-MCNC: 0.3 MG/DL (ref 0.2–1)
BUN SERPL-MCNC: 16 MG/DL (ref 6–20)
BUN/CREAT SERPL: 11 (ref 12–20)
CALCIUM SERPL-MCNC: 9 MG/DL (ref 8.5–10.1)
CHLORIDE SERPL-SCNC: 107 MMOL/L (ref 97–108)
CO2 SERPL-SCNC: 29 MMOL/L (ref 21–32)
COMMENT, HOLDF: NORMAL
CREAT SERPL-MCNC: 1.43 MG/DL (ref 0.55–1.02)
DIFFERENTIAL METHOD BLD: NORMAL
EOSINOPHIL # BLD: 0.2 K/UL (ref 0–0.4)
EOSINOPHIL NFR BLD: 3 % (ref 0–7)
ERYTHROCYTE [DISTWIDTH] IN BLOOD BY AUTOMATED COUNT: 14.5 % (ref 11.5–14.5)
GLOBULIN SER CALC-MCNC: 4.2 G/DL (ref 2–4)
GLUCOSE SERPL-MCNC: 219 MG/DL (ref 65–100)
HCT VFR BLD AUTO: 37.5 % (ref 35–47)
HGB BLD-MCNC: 11.7 G/DL (ref 11.5–16)
IMM GRANULOCYTES # BLD AUTO: 0 K/UL (ref 0–0.04)
IMM GRANULOCYTES NFR BLD AUTO: 0 % (ref 0–0.5)
LYMPHOCYTES # BLD: 1.7 K/UL (ref 0.8–3.5)
LYMPHOCYTES NFR BLD: 31 % (ref 12–49)
MCH RBC QN AUTO: 28.6 PG (ref 26–34)
MCHC RBC AUTO-ENTMCNC: 31.2 G/DL (ref 30–36.5)
MCV RBC AUTO: 91.7 FL (ref 80–99)
MONOCYTES # BLD: 0.5 K/UL (ref 0–1)
MONOCYTES NFR BLD: 8 % (ref 5–13)
NEUTS SEG # BLD: 3.1 K/UL (ref 1.8–8)
NEUTS SEG NFR BLD: 57 % (ref 32–75)
NRBC # BLD: 0 K/UL (ref 0–0.01)
NRBC BLD-RTO: 0 PER 100 WBC
PLATELET # BLD AUTO: 288 K/UL (ref 150–400)
PMV BLD AUTO: 10.3 FL (ref 8.9–12.9)
POTASSIUM SERPL-SCNC: 3.2 MMOL/L (ref 3.5–5.1)
PROT SERPL-MCNC: 8 G/DL (ref 6.4–8.2)
RBC # BLD AUTO: 4.09 M/UL (ref 3.8–5.2)
SAMPLES BEING HELD,HOLD: NORMAL
SODIUM SERPL-SCNC: 142 MMOL/L (ref 136–145)
WBC # BLD AUTO: 5.5 K/UL (ref 3.6–11)

## 2020-03-13 PROCEDURE — 96360 HYDRATION IV INFUSION INIT: CPT

## 2020-03-13 PROCEDURE — 70491 CT SOFT TISSUE NECK W/DYE: CPT

## 2020-03-13 PROCEDURE — 36415 COLL VENOUS BLD VENIPUNCTURE: CPT

## 2020-03-13 PROCEDURE — 74011250636 HC RX REV CODE- 250/636: Performed by: PHYSICIAN ASSISTANT

## 2020-03-13 PROCEDURE — 74011250637 HC RX REV CODE- 250/637: Performed by: PHYSICIAN ASSISTANT

## 2020-03-13 PROCEDURE — 80053 COMPREHEN METABOLIC PANEL: CPT

## 2020-03-13 PROCEDURE — 99283 EMERGENCY DEPT VISIT LOW MDM: CPT

## 2020-03-13 PROCEDURE — 74011636320 HC RX REV CODE- 636/320: Performed by: RADIOLOGY

## 2020-03-13 PROCEDURE — 85025 COMPLETE CBC W/AUTO DIFF WBC: CPT

## 2020-03-13 PROCEDURE — 82150 ASSAY OF AMYLASE: CPT

## 2020-03-13 RX ORDER — POTASSIUM CHLORIDE 750 MG/1
20 TABLET, FILM COATED, EXTENDED RELEASE ORAL DAILY
Qty: 6 TAB | Refills: 0 | Status: SHIPPED | OUTPATIENT
Start: 2020-03-13 | End: 2020-03-16

## 2020-03-13 RX ORDER — POTASSIUM CHLORIDE 750 MG/1
40 TABLET, FILM COATED, EXTENDED RELEASE ORAL
Status: COMPLETED | OUTPATIENT
Start: 2020-03-13 | End: 2020-03-13

## 2020-03-13 RX ADMIN — SODIUM CHLORIDE 1000 ML: 900 INJECTION, SOLUTION INTRAVENOUS at 13:58

## 2020-03-13 RX ADMIN — IOPAMIDOL 100 ML: 612 INJECTION, SOLUTION INTRAVENOUS at 13:31

## 2020-03-13 RX ADMIN — POTASSIUM CHLORIDE 40 MEQ: 750 TABLET, FILM COATED, EXTENDED RELEASE ORAL at 14:00

## 2020-03-13 NOTE — DISCHARGE INSTRUCTIONS
Patient Education   Patient Education        Head or Face Pain: Care Instructions  Your Care Instructions    Common causes of head or face pain are allergies, stress, and injuries. Other causes include tooth problems and sinus infections. Eating certain foods, such as chocolate or cheese, or drinking certain liquids, such as coffee or cola, can cause head pain for some people. If you have mild head pain, you may not need treatment. It is important to watch your symptoms and talk to your doctor if your pain continues or gets worse. Follow-up care is a key part of your treatment and safety. Be sure to make and go to all appointments, and call your doctor if you are having problems. It's also a good idea to know your test results and keep a list of the medicines you take. How can you care for yourself at home? · Take pain medicines exactly as directed. ? If the doctor gave you a prescription medicine for pain, take it as prescribed. ? If you are not taking a prescription pain medicine, ask your doctor if you can take an over-the-counter pain medicine. · Take it easy for the next few days or longer if you are not feeling well. · Use a warm, moist towel or heating pad set on low to relax tight muscles in your shoulder and neck. Have someone gently massage your neck and shoulders. · Put ice or a cold pack on the area for 10 to 20 minutes at a time. Put a thin cloth between the ice and your skin. When should you call for help? Call 911 anytime you think you may need emergency care. For example, call if:    · You have twitching, jerking, or a seizure.     · You passed out (lost consciousness).     · You have symptoms of a stroke. These may include:  ? Sudden numbness, tingling, weakness, or loss of movement in your face, arm, or leg, especially on only one side of your body. ? Sudden vision changes. ? Sudden trouble speaking. ? Sudden confusion or trouble understanding simple statements.   ? Sudden problems with walking or balance. ? A sudden, severe headache that is different from past headaches.     · You have jaw pain and pain in your chest, shoulder, neck, or arm.    Call your doctor now or seek immediate medical care if:    · You have a fever with a stiff neck or a severe headache.     · You have nausea and vomiting, or you cannot keep food or liquids down.    Watch closely for changes in your health, and be sure to contact your doctor if:    · Your head or face pain does not get better as expected. Where can you learn more? Go to http://yesenia-anshu.info/  Enter P568 in the search box to learn more about \"Head or Face Pain: Care Instructions. \"  Current as of: June 26, 2019Content Version: 12.4  © 8525-1547 [employer]. Care instructions adapted under license by [employer] (which disclaims liability or warranty for this information). If you have questions about a medical condition or this instruction, always ask your healthcare professional. [place] [address] any warranty or liability for your use of this information. Hypokalemia: Care Instructions  Your Care Instructions    Hypokalemia (say \"ue-fu-ztd-CANDI-chente-uh\") is a low level of potassium. The heart, muscles, kidneys, and nervous system all need potassium to work well. This problem has many different causes. Kidney problems, diet, and medicines like diuretics and laxatives can cause it. So can vomiting or diarrhea. In some cases, cancer is the cause. Your doctor may do tests to find the cause of your low potassium levels. You may need medicines to bring your potassium levels back to normal. You may also need regular blood tests to check your potassium. If you have very low potassium, you may need intravenous (IV) medicines. You also may need tests to check the electrical activity of your heart. Heart problems caused by low potassium levels can be very serious.   Follow-up care is a key part of your treatment and safety. Be sure to make and go to all appointments, and call your doctor if you are having problems. It's also a good idea to know your test results and keep a list of the medicines you take. How can you care for yourself at home? · If your doctor recommends it, eat foods that have a lot of potassium. These include fresh fruits, juices, and vegetables. They also include nuts, beans, and milk. · Be safe with medicines. If your doctor prescribes medicines or potassium supplements, take them exactly as directed. Call your doctor if you have any problems with your medicines. · Get your potassium levels tested as often as your doctor tells you. When should you call for help?    · You feel like your heart is missing beats. Heart problems caused by low potassium can cause death.     · You passed out (lost consciousness).     · You have a seizure.    Call your doctor now or seek immediate medical care if:    · You feel weak or unusually tired.     · You have severe arm or leg cramps.     · You have tingling or numbness.     · You feel sick to your stomach, or you vomit.     · You have belly cramps.     · You feel bloated or constipated.     · You have to urinate a lot.     · You feel very thirsty most of the time.     · You are dizzy or lightheaded, or you feel like you may faint.     · You feel depressed, or you lose touch with reality.    Watch closely for changes in your health, and be sure to contact your doctor if:    · You do not get better as expected. Where can you learn more? Go to http://yesenia-anshu.info/  Enter G358 in the search box to learn more about \"Hypokalemia: Care Instructions. \"  Current as of: July 28, 2019Content Version: 12.4  © 6664-9522 [employer]. Care instructions adapted under license by [employer] (which disclaims liability or warranty for this information).  If you have questions about a medical condition or this instruction, always ask your healthcare professional. [place] [address] any warranty or liability for your use of this information.

## 2020-03-13 NOTE — ED PROVIDER NOTES
59 y.o. female with past medical history significant for HTN, hypercholesterolemia, diabetes, narcolepsy, GERD, and genital herpes who presents from home via private vehicle with chief complaint of facial swelling. Pt reports her face began \"aching\" yesterday and her daughter noted that she had bilateral facial swelling. She also c/o right ear pain, diaphoresis, and sore throat that causes pain with swallowing. She drank hot tea, took Nyquil and Theraflu without relief. Pt specifically denies fever, speech difficulty, dental pain, shortness of breath, rhinorrhea, and any other pain or symptoms. There are no other acute medical concerns at this time. Social hx: Never tobacco smoker; Yes EtOH use; Denies illicit drug use  PCP: Flora Díaz MD    Note written by Senthil Reyes, as dictated by Marita Harada, PA-C 11:48 AM    The history is provided by the patient and medical records. No  was used. Past Medical History:   Diagnosis Date    Diabetes ([facility] [address]     Genital herpes simplex type 2     GERD (gastroesophageal reflux disease)     no medication prescribed    Hypercholesterolemia     Hypertension     Narcolepsy     Neuropathy in diabetes ([facility] [address]     Obesity (BMI 30.0-34. 9)        Past Surgical History:   Procedure Laterality Date    COLONOSCOPY N/A 2019    [facility] HX  SECTION  03/15/1979    HX  SECTION  1990    HX KNEE REPLACEMENT Bilateral     HX MOHS PROCEDURES Right     HX OTHER SURGICAL Right 2019    Excision of lipoma of the right buttock.     HX ROTATOR CUFF REPAIR Right     TN EXTRAC ERUPTED TOOTH/EXPOSED ROOT Right 14    3 UPPER RIGHT SIDE BACK TEETH         Family History:   Problem Relation Age of Onset    Diabetes Mother     Heart Disease Mother     Hypertension Mother    Joshua Garcia Arthritis-rheumatoid Mother     Diabetes Father     Heart Disease Father     Cancer Maternal Aunt         bone    Diabetes Sister        Social History     Socioeconomic History    Marital status:      Spouse name: Not on file    Number of children: Not on file    Years of education: Not on file    Highest education level: Not on file   Occupational History    Not on file   Social Needs    Financial resource strain: Not on file    Food insecurity     Worry: Not on file     Inability: Not on file    Transportation needs     Medical: Not on file     Non-medical: Not on file   Tobacco Use    Smoking status: Never Smoker    Smokeless tobacco: Never Used   Substance and Sexual Activity    Alcohol use: Yes     Alcohol/week: 0.0 - 1.0 standard drinks     Frequency: Never    Drug use: No    Sexual activity: Yes     Partners: Male     Birth control/protection: None   Lifestyle    Physical activity     Days per week: Not on file     Minutes per session: Not on file    Stress: Not on file   Relationships    Social connections     Talks on phone: Not on file     Gets together: Not on file     Attends Anabaptist service: Not on file     Active member of club or organization: Not on file     Attends meetings of clubs or organizations: Not on file     Relationship status: Not on file    Intimate partner violence     Fear of current or ex partner: Not on file     Emotionally abused: Not on file     Physically abused: Not on file     Forced sexual activity: Not on file   Other Topics Concern    Not on file   Social History Narrative    Not on file         ALLERGIES: Aspirin and Clindamycin    Review of Systems   Constitutional: Positive for diaphoresis. Negative for fever. HENT: Positive for ear pain, facial swelling, sore throat and trouble swallowing. Negative for dental problem and rhinorrhea. Respiratory: Negative for shortness of breath. Neurological: Negative for speech difficulty. All other systems reviewed and are negative.       Vitals: 03/13/20 1047   BP: 147/54   Pulse: 99   Resp: 18   Temp: 98.6 °F (37 °C)   SpO2: 98%   Weight: 85.3 kg (188 lb)   Height: 5' 3\" (1.6 m)            Physical Exam  Vitals signs and nursing note reviewed. Constitutional:       General: She is not in acute distress. Appearance: She is well-developed. She is not toxic-appearing or diaphoretic. Comments: AA female   HENT:      Head: Normocephalic and atraumatic. Right Ear: Tympanic membrane normal.      Left Ear: Tympanic membrane normal.      Nose: No congestion or rhinorrhea. Mouth/Throat:      Mouth: Mucous membranes are moist.     Eyes:      General:         Right eye: No discharge. Left eye: No discharge. Conjunctiva/sclera: Conjunctivae normal.      Pupils: Pupils are equal, round, and reactive to light. Neck:      Musculoskeletal: Full passive range of motion without pain and normal range of motion. Trachea: No tracheal tenderness. Cardiovascular:      Rate and Rhythm: Normal rate and regular rhythm. Pulses: Normal pulses. Heart sounds: Normal heart sounds. No murmur. No friction rub. No gallop. Pulmonary:      Effort: Pulmonary effort is normal. No respiratory distress. Breath sounds: Normal breath sounds. No wheezing or rales. Chest:      Chest wall: No tenderness. Abdominal:      General: Bowel sounds are normal. There is no distension. Palpations: Abdomen is soft. Tenderness: There is no abdominal tenderness. There is no guarding or rebound. Musculoskeletal: Normal range of motion. General: No tenderness. Skin:     General: Skin is warm and dry. Capillary Refill: Capillary refill takes less than 2 seconds. Findings: No abrasion, erythema or rash. Neurological:      Mental Status: She is alert and oriented to person, place, and time. Cranial Nerves: No cranial nerve deficit. Sensory: No sensory deficit.       Coordination: Coordination normal. Psychiatric:         Speech: Speech normal.         Behavior: Behavior normal.          MDM  Number of Diagnoses or Management Options  Elevated serum creatinine:   Hypokalemia:   Left facial swelling:   Diagnosis management comments:   Ddx: parotiditis, abscess, electrolyte abnormality       Amount and/or Complexity of Data Reviewed  Clinical lab tests: reviewed and ordered  Tests in the radiology section of CPT®: ordered and reviewed  Review and summarize past medical records: yes  Discuss the patient with other providers: yes    Patient Progress  Patient progress: stable         Procedures    I discussed patient's PMH, exam findings as well as careplan with the ER attending who agrees with care plan. Lashae Rubio PA-C    LABORATORY TESTS:  Recent Results (from the past 12 hour(s))   CBC WITH AUTOMATED DIFF    Collection Time: 03/13/20 12:28 PM   Result Value Ref Range    WBC 5.5 3.6 - 11.0 K/uL    RBC 4.09 3.80 - 5.20 M/uL    HGB 11.7 11.5 - 16.0 g/dL    HCT 37.5 35.0 - 47.0 %    MCV 91.7 80.0 - 99.0 FL    MCH 28.6 26.0 - 34.0 PG    MCHC 31.2 30.0 - 36.5 g/dL    RDW 14.5 11.5 - 14.5 %    PLATELET 559 601 - 013 K/uL    MPV 10.3 8.9 - 12.9 FL    NRBC 0.0 0  WBC    ABSOLUTE NRBC 0.00 0.00 - 0.01 K/uL    NEUTROPHILS 57 32 - 75 %    LYMPHOCYTES 31 12 - 49 %    MONOCYTES 8 5 - 13 %    EOSINOPHILS 3 0 - 7 %    BASOPHILS 1 0 - 1 %    IMMATURE GRANULOCYTES 0 0.0 - 0.5 %    ABS. NEUTROPHILS 3.1 1.8 - 8.0 K/UL    ABS. LYMPHOCYTES 1.7 0.8 - 3.5 K/UL    ABS. MONOCYTES 0.5 0.0 - 1.0 K/UL    ABS. EOSINOPHILS 0.2 0.0 - 0.4 K/UL    ABS. BASOPHILS 0.1 0.0 - 0.1 K/UL    ABS. IMM.  GRANS. 0.0 0.00 - 0.04 K/UL    DF AUTOMATED     METABOLIC PANEL, COMPREHENSIVE    Collection Time: 03/13/20 12:28 PM   Result Value Ref Range    Sodium 142 136 - 145 mmol/L    Potassium 3.2 (L) 3.5 - 5.1 mmol/L    Chloride 107 97 - 108 mmol/L    CO2 29 21 - 32 mmol/L    Anion gap 6 5 - 15 mmol/L    Glucose 219 (H) 65 - 100 mg/dL    BUN 16 6 - 20 MG/DL    Creatinine 1.43 (H) 0.55 - 1.02 MG/DL    BUN/Creatinine ratio 11 (L) 12 - 20      GFR est AA 45 (L) >60 ml/min/1.73m2    GFR est non-AA 37 (L) >60 ml/min/1.73m2    Calcium 9.0 8.5 - 10.1 MG/DL    Bilirubin, total 0.3 0.2 - 1.0 MG/DL    ALT (SGPT) 33 12 - 78 U/L    AST (SGOT) 23 15 - 37 U/L    Alk. phosphatase 107 45 - 117 U/L    Protein, total 8.0 6.4 - 8.2 g/dL    Albumin 3.8 3.5 - 5.0 g/dL    Globulin 4.2 (H) 2.0 - 4.0 g/dL    A-G Ratio 0.9 (L) 1.1 - 2.2     SAMPLES BEING HELD    Collection Time: 03/13/20 12:28 PM   Result Value Ref Range    SAMPLES BEING HELD 1RED,1SST     COMMENT        Add-on orders for these samples will be processed based on acceptable specimen integrity and analyte stability, which may vary by analyte. AMYLASE    Collection Time: 03/13/20 12:28 PM   Result Value Ref Range    Amylase 123 (H) 25 - 115 U/L           MEDICATIONS GIVEN:  Medications   iopamidoL (ISOVUE 300) 61 % contrast injection 100 mL (100 mL IntraVENous Given 3/13/20 1331)   sodium chloride 0.9 % bolus infusion 1,000 mL (0 mL IntraVENous IV Completed 3/13/20 1503)   potassium chloride SR (KLOR-CON 10) tablet 40 mEq (40 mEq Oral Given 3/13/20 1400)         DISCHARGE NOTE:  3:45 PM  The patient's results have been reviewed with them and/or available family. Patient and/or family verbally conveyed their understanding and agreement of the patient's signs, symptoms, diagnosis, treatment and prognosis and additionally agree to follow up as recommended in the discharge instructions or to return to the Emergency Room should their condition change prior to their follow-up appointment. The patient/family verbally agrees with the care-plan and verbally conveys that all of their questions have been answered.  The discharge instructions have also been provided to the patient and/or family with some educational information regarding the patient's diagnosis as well a list of reasons why the patient would want to return to the ER prior to their follow-up appointment, should their condition change. Plan:  1. F/U with pcp or ENT  2. Rx KCl  3.  Push liquids, return precautions discussed  Return precautions discussed and advised to return to ER if worse

## 2020-04-09 ENCOUNTER — TELEPHONE (OUTPATIENT)
Dept: FAMILY MEDICINE CLINIC | Age: 65
End: 2020-04-09

## 2020-04-09 NOTE — TELEPHONE ENCOUNTER
I called [facility] to touched base with them in regards to any needs they may have.      Left VM     Erich Villar MD

## 2020-04-09 NOTE — TELEPHONE ENCOUNTER
Dr. Justin Rivera   Received:  Today   Message Contents   Yolanda Shin [facility] Front [facility]             Caller's first and last name: Nichole Mccain   Reason for call:   returning call from the office   Callback required yes/no and why: yes   Best contact number(s):  [phone]   Details to clarify the request:

## 2020-04-15 ENCOUNTER — TELEPHONE (OUTPATIENT)
Dept: FAMILY MEDICINE CLINIC | Age: 65
End: 2020-04-15

## 2020-04-15 NOTE — TELEPHONE ENCOUNTER
----- Message from Joseline Cuenca sent at 4/14/2020  3:59 PM EDT -----  Regarding: Dr. Kapoor Fix: [phone]  Patient is requesting a call back to schedule a appointment for an injection for shoulder pain. Patient states that both of her shoulders are hurting.

## 2020-04-20 DIAGNOSIS — E11.65 TYPE 2 DIABETES MELLITUS WITH HYPERGLYCEMIA, UNSPECIFIED WHETHER LONG TERM INSULIN USE (HCC): ICD-10-CM

## 2020-04-20 RX ORDER — DULAGLUTIDE 1.5 MG/.5ML
INJECTION, SOLUTION SUBCUTANEOUS
Qty: 6 ML | Refills: 4 | Status: SHIPPED | OUTPATIENT
Start: 2020-04-20 | End: 2021-04-02 | Stop reason: SDUPTHER

## 2020-05-05 ENCOUNTER — VIRTUAL VISIT (OUTPATIENT)
Dept: FAMILY MEDICINE CLINIC | Age: 65
End: 2020-05-05

## 2020-05-05 DIAGNOSIS — M25.511 CHRONIC PAIN OF BOTH SHOULDERS: Primary | ICD-10-CM

## 2020-05-05 DIAGNOSIS — M19.011 ARTHRITIS OF RIGHT GLENOHUMERAL JOINT: ICD-10-CM

## 2020-05-05 DIAGNOSIS — G89.29 CHRONIC PAIN OF BOTH SHOULDERS: Primary | ICD-10-CM

## 2020-05-05 DIAGNOSIS — M25.512 CHRONIC PAIN OF BOTH SHOULDERS: Primary | ICD-10-CM

## 2020-05-05 NOTE — PROGRESS NOTES
Fidelina Al  59 y.o. female  1955  [address] [facility]  [id number]   [address]:    Telemedicine Progress Note  Candis Mcdaniel MD       Encounter Date and Time: May 5, 2020 at 9:20 AM    Consent:  She and/or the health care decision maker is aware that that she may receive a bill for this telephone service, depending on her insurance coverage, and has provided verbal consent to proceed: Yes    No chief complaint on file. History of Present Illness   Fidelina Al is a 59 y.o. female was evaluated by synchronous (real-time) audio-video technology from home, through the [employer] Patient Fallon Sampson is a 59 y.o. female complains of pain in the bilateral shoulder pain. States right should pain is the same pain she has had before. Date of Onset: 1 week for left and 2 weeks for right  Mechanism of Injury: None  Alleviating Factors: Rest  Aggravating Factors: Use and lifting arm    Right shoulder has been on and off, 2 US guided injections helped, 7/3/19 and 1/15/20. She has been reluctant for surgery and failed PT as well. Review of Systems   Review of Systems   Constitutional: Negative for chills and fever. Respiratory: Negative for wheezing. Skin: Negative for rash. Neurological: Negative for sensory change. Psychiatric/Behavioral: The patient is not nervous/anxious. Vitals/Objective:     General: alert, cooperative, no distress   Mental  status: mental status: alert, oriented to person, place, and time, normal mood, behavior, speech, dress, motor activity, and thought processes   Resp: resp: normal effort and no respiratory distress   Neuro: neuro: no gross deficits   Skin: skin: no discoloration or lesions of concern on visible areas   Due to this being a TeleHealth evaluation, many elements of the physical examination are unable to be assessed. GEN: Well appearing.  No apparent distress. Responds to all questions appropriately. Lungs: No labored respirations. Talking in  complete sentences without difficulty. Shoulder: Left and left    Deformity: None    ROM:  Forward Flexion: Active: 160 R and 180 left   ER at 90 degrees abduction: Active: 70 right anf 90 on left  IR at 90 degrees abduction: Active: 70 right and 90 on left           Assessment and Plan:   Time-based coding, delete if not needed: I spent at least 15 minutes with this established patient, and >50% of the time was spent counseling and/or coordinating care regarding shoulder pain     1. Chronic pain of both shoulders  - REFERRAL TO ORTHOPEDICS    2. Arthritis of right glenohumeral joint  - REFERRAL TO ORTHOPEDICS      Assessment/Plan: Encouraged patient to see orthopedics. Prior patient of Dr. Nadia Buchanan, but I do not see him in the system. Will refer to Dr. Cordelia Jenkins. Time spent in direct conversation with the patient to include medical condition(s) discussed, assessment and treatment plan:       We discussed the expected course, resolution and complications of the diagnosis(es) in detail. Medication risks, benefits, costs, interactions, and alternatives were discussed as indicated. I advised her to contact the office if her condition worsens, changes or fails to improve as anticipated. She expressed understanding with the diagnosis(es) and plan. Patient understands that this encounter was a temporary measure, and the importance of further follow up and examination was emphasized. Patient verbalized understanding. Patient informed to follow up: As needed. Follow-up and Dispositions  ·   Return if symptoms worsen or fail to improve. Electronically Signed: Cristina Contreras MD    Providers location when delivering service (clinic, hospital, home): home    CPT Codes 60485-44681 for Established Patients may apply to this Telehealth Visit. POS code: 18.   Modifier GT      Pursuant to the emergency declaration under the [facility], [facility] waiver authority and the [employer] Act, this Virtual  Visit was conducted, with patient's consent, to reduce the patient's risk of exposure to COVID-19 and provide continuity of care for an established patient. Services were provided through a video synchronous discussion virtually to substitute for in-person clinic visit. History   Patients past medical, surgical and family histories were reviewed and updated. Past Medical History:   Diagnosis Date    Diabetes ([facility]     Genital herpes simplex type 2     GERD (gastroesophageal reflux disease)     no medication prescribed    Hypercholesterolemia     Hypertension     Narcolepsy     Neuropathy in diabetes ([facility]     Obesity (BMI 30.0-34. 9)      Past Surgical History:   Procedure Laterality Date    COLONOSCOPY N/A 2019    COLONOSCOPY performed by Latrice York MD at [address] HX  SECTION  03/15/1979    HX  SECTION  1990    HX KNEE REPLACEMENT Bilateral     HX MOHS PROCEDURES Right     HX OTHER SURGICAL Right 2019    Excision of lipoma of the right buttock.     HX ROTATOR CUFF REPAIR Right     HI EXTRAC ERUPTED TOOTH/EXPOSED ROOT Right 14    3 UPPER RIGHT SIDE BACK TEETH     Family History   Problem Relation Age of Onset    Diabetes Mother     Heart Disease Mother     Hypertension Mother    Gloriajean Seeds Arthritis-rheumatoid Mother     Diabetes Father     Heart Disease Father     Cancer Maternal Aunt         bone    Diabetes Sister      Social History     Socioeconomic History    Marital status:      Spouse name: Not on file    Number of children: Not on file    Years of education: Not on file    Highest education level: Not on file   Occupational History    Not on file   Social Needs    Financial resource strain: Not on file    Food insecurity     Worry: Not on file     Inability: Not on file    Transportation needs     Medical: Not on file     Non-medical: Not on file   Tobacco Use    Smoking status: Never Smoker    Smokeless tobacco: Never Used   Substance and Sexual Activity    Alcohol use:  Yes     Alcohol/week: 0.0 - 1.0 standard drinks     Frequency: Never    Drug use: No    Sexual activity: Yes     Partners: Male     Birth control/protection: None   Lifestyle    Physical activity     Days per week: Not on file     Minutes per session: Not on file    Stress: Not on file   Relationships    Social connections     Talks on phone: Not on file     Gets together: Not on file     Attends [employer] service: Not on file     Active member of club or organization: Not on file     Attends meetings of clubs or organizations: Not on file     Relationship status: Not on file    Intimate partner violence     Fear of current or ex partner: Not on file     Emotionally abused: Not on file     Physically abused: Not on file     Forced sexual activity: Not on file   Other Topics Concern    Not on file   Social History Narrative    Not on file     Patient Active Problem List   Diagnosis Code    Hypertension I10    Diabetes ([address] E11.9    Hypercholesterolemia E78.00    Other chest pain R07.89    Neuropathy due to secondary diabetes ([address] E13.40    Lumbar stenosis M48.061    Lumbar herniated disc M51.26    Postcoital UTI N39.0    Type 2 diabetes mellitus with diabetic nephropathy, without long-term current use of insulin ([address] E11.21    Type 2 diabetes mellitus with hyperglycemia, without long-term current use of insulin ([address] E11.65    Closed nondisplaced fracture of first cervical vertebra ([address] S12.001A    Closed fracture of multiple ribs of right side with routine healing S22.41XD    Type 2 diabetes mellitus with diabetic neuropathy ([address] E11.40    HSV-2 infection B00.9    Breast cancer screening Z12.39    Cervical cancer screening Z12.4    Obesity (BMI 30.0-34. 9) E66.9    Shingles rash B02.9    S/P excision of lipoma Z98.890, Z86.018    Severe obesity (HCC) E66.01          Current Medications/Allergies   Medications and Allergies reviewed:    Current Outpatient Medications   Medication Sig Dispense Refill    Trulicity 1.5 YN/8.8 mL sub-q pen INJECT 1 SYRINGE UNDER THE SKIN EVERY 7 DAYS 6 mL 4    gabapentin (NEURONTIN) 800 mg tablet TK 1 T PO Q 8 HOURS      DULoxetine (CYMBALTA) 30 mg capsule TK ONE C PO  Q 12 H      ammonium lactate (LAC-HYDRIN) 12 % lotion JEMAL EXT TO ALL SURFACES OF BOTH FEET BID      metFORMIN (GLUCOPHAGE) 1,000 mg tablet Take 1 Tab by mouth two (2) times daily (with meals). 180 Tab 3    rosuvastatin (CRESTOR) 10 mg tablet TAKE 1 TABLET BY MOUTH DAILY 90 Tab 3    amLODIPine-benazepril (LOTREL) 10-20 mg per capsule Take 1 Cap by mouth daily. 90 Cap 3    hydroCHLOROthiazide (HYDRODIURIL) 25 mg tablet TAKE 1 TABLET BY MOUTH DAILY 90 Tab 3    cyanocobalamin (VITAMIN B12) 100 mcg tablet Take 1 Tab by mouth daily. 90 Tab 3    glipiZIDE (GLUCOTROL) 10 mg tablet TAKE 1 TABLET BY MOUTH TWICE DAILY 180 Tab 3    varicella-zoster recombinant, PF, (SHINGRIX, PF,) 50 mcg/0.5 mL susr injection 0.5mL by IntraMUSCular route once now and then repeat in 2-6 months 0.5 mL 1    naloxone (NARCAN) 2 mg/actuation spry Use 1 spray intranasally, then discard. Repeat with new spray every 2 min as needed for opioid overdose symptoms, alternating nostrils. 1 Vial 1    famotidine (PEPCID) 40 mg tablet Take 1 Tab by mouth daily. 30 Tab 3    Blood-Glucose Meter (ACCU-CHEK GEORGINA PLUS METER) misc Test once daily Dx Code: E11.65 1 Each 0    glucose blood VI test strips (ACCU-CHEK GEORGINA PLUS TEST STRP) strip Test once daily Dx Code: E11.65 100 Strip 3    lancets (ACCU-CHEK FASTCLIX LANCET DRUM) misc Test once daily Dx Code: E11.65 100 Each 3    peg 400-propylene glycol (SYSTANE, PROPYLENE GLYCOL,) 0.4-0.3 % drop Administer 1 Drop to both eyes as needed.       nystatin (MYCOSTATIN) topical cream Apply  to affected area two (2) times a day. 60 g 1    omega-3 fatty acids-vitamin e (FISH OIL) 1,000 mg Cap Take 1 Cap by mouth daily.        Allergies   Allergen Reactions    Aspirin Hives     Tolerates ibuprofen and naproxen    Clindamycin Rash and Hives

## 2020-05-12 ENCOUNTER — VIRTUAL VISIT (OUTPATIENT)
Dept: ENDOCRINOLOGY | Age: 65
End: 2020-05-12

## 2020-05-12 DIAGNOSIS — E11.65 TYPE 2 DIABETES MELLITUS WITH HYPERGLYCEMIA, WITHOUT LONG-TERM CURRENT USE OF INSULIN (HCC): Primary | ICD-10-CM

## 2020-05-12 DIAGNOSIS — E13.40 NEUROPATHY DUE TO SECONDARY DIABETES (HCC): ICD-10-CM

## 2020-05-12 DIAGNOSIS — I10 ESSENTIAL HYPERTENSION: ICD-10-CM

## 2020-05-12 DIAGNOSIS — E11.65 TYPE 2 DIABETES MELLITUS WITH HYPERGLYCEMIA, WITHOUT LONG-TERM CURRENT USE OF INSULIN (HCC): ICD-10-CM

## 2020-05-12 DIAGNOSIS — E78.00 HYPERCHOLESTEROLEMIA: ICD-10-CM

## 2020-05-12 RX ORDER — TRAMADOL HYDROCHLORIDE 50 MG/1
50 TABLET ORAL 3 TIMES DAILY
COMMUNITY

## 2020-05-12 RX ORDER — METFORMIN HYDROCHLORIDE 1000 MG/1
1000 TABLET ORAL 2 TIMES DAILY WITH MEALS
Qty: 180 TAB | Refills: 3 | Status: SHIPPED | OUTPATIENT
Start: 2020-05-12 | End: 2021-04-02 | Stop reason: SDUPTHER

## 2020-05-12 RX ORDER — GLIPIZIDE 10 MG/1
TABLET ORAL
Qty: 180 TAB | Refills: 3 | Status: SHIPPED | OUTPATIENT
Start: 2020-05-12 | End: 2021-04-02 | Stop reason: SDUPTHER

## 2020-05-12 RX ORDER — PREGABALIN 100 MG/1
100 CAPSULE ORAL 3 TIMES DAILY
COMMUNITY
Start: 2020-04-22
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
## 2020-05-12 NOTE — PROGRESS NOTES
Chayo Campo is a 59 y.o. female here for   Chief Complaint   Patient presents with    Diabetes     Pt consented to virtual visit. 1. Have you been to the ER, urgent care clinic since your last visit? Hospitalized since your last visit? -[facility] in March for swollen glands    2. Have you seen or consulted any other health care providers outside of the [address] [place] [address] since your last visit?   Include any pap smears or colon screening.-Spine/pain Dr. Chava Matta and [facility]

## 2020-06-04 ENCOUNTER — HOSPITAL ENCOUNTER (OUTPATIENT)
Dept: MRI IMAGING | Age: 65
Discharge: HOME | End: 2020-06-04
Attending: PHYSICAL MEDICINE & REHABILITATION
Payer: MEDICARE

## 2020-06-04 DIAGNOSIS — M47.817 SPONDYLOSIS OF LUMBOSACRAL REGION WITHOUT MYELOPATHY OR RADICULOPATHY: ICD-10-CM

## 2020-06-04 DIAGNOSIS — Z79.891 ENCOUNTER FOR LONG-TERM USE OF OPIATE ANALGESIC: ICD-10-CM

## 2020-06-04 DIAGNOSIS — M54.50 LOW BACK PAIN: ICD-10-CM

## 2020-06-04 PROCEDURE — 72148 MRI LUMBAR SPINE W/O DYE: CPT

## 2020-07-01 ENCOUNTER — VIRTUAL VISIT (OUTPATIENT)
Dept: FAMILY MEDICINE CLINIC | Age: 65
End: 2020-07-01

## 2020-07-01 DIAGNOSIS — A60.00 RECURRENT GENITAL HSV (HERPES SIMPLEX VIRUS) INFECTION: Primary | ICD-10-CM

## 2020-07-01 RX ORDER — VALACYCLOVIR HYDROCHLORIDE 500 MG/1
500 TABLET, FILM COATED ORAL 2 TIMES DAILY
Qty: 6 TAB | Refills: 3 | Status: SHIPPED | OUTPATIENT
Start: 2020-07-01 | End: 2020-07-04

## 2020-07-01 NOTE — PROGRESS NOTES
Krishna Cervantes  59 y.o. female  1955  [address] [facility]  [id number]   [address]:    Telemedicine Progress Note  Wilian Orr MD       Encounter Date and Time: July 1, 2020 at 8:04 AM    Consent:  She and/or the health care decision maker is aware that that she may receive a bill for this telephone service, depending on her insurance coverage, and has provided verbal consent to proceed: Yes    Chief Complaint   Patient presents with    Sexually Transmitted Disease     History of Present Illness   Krishna Cervantes is a 59 y.o. female was evaluated by synchronous (real-time) audio-video technology from home, through the [facility].    Patient reports h/o recurrent genital herpes about 2 times a year. She is currently having an outbreak. Symptoms began yesterday. She stated that she ran out of her valacyclovir that she takes when she has an episode. Patient has no other concerns at this time. Patient denies fever, chills, flu like symptoms, and headache. Review of Systems   Review of Systems   Constitutional: Negative for chills, fever and malaise/fatigue. Neurological: Negative for headaches. Vitals/Objective:     General: alert, cooperative, no distress   Mental  status: mental status: alert, oriented to person, place, and time, normal mood, behavior, speech, dress, motor activity, and thought processes   Resp: resp: normal effort and no respiratory distress   Neuro: neuro: no gross deficits   Skin: skin: no discoloration or lesions of concern on visible areas   Due to this being a TeleHealth evaluation, many elements of the physical examination are unable to be assessed. Assessment and Plan:   Time-based coding, delete if not needed: I spent at least 15 minutes with this established patient, and >50% of the time was spent counseling and/or coordinating care regarding Genital herpes outbreak, recurrent    1. Recurrent genital HSV (herpes simplex virus) infection: Patient reports about 2 outbreaks a year and has tolerated Valacyclovir well in the past. Needs refill.    - valACYclovir (VALTREX) 500 mg tablet; Take 1 Tab by mouth two (2) times a day for 3 days. Indications: recurrent genital herpes  Dispense: 6 Tab; Refill: 3      Time spent in direct conversation with the patient to include medical condition(s) discussed, assessment and treatment plan: 15 min       We discussed the expected course, resolution and complications of the diagnosis(es) in detail. Medication risks, benefits, costs, interactions, and alternatives were discussed as indicated. I advised her to contact the office if her condition worsens, changes or fails to improve as anticipated. She expressed understanding with the diagnosis(es) and plan. Patient understands that this encounter was a temporary measure, and the importance of further follow up and examination was emphasized. Patient verbalized understanding. Patient informed to follow up: As needed. Electronically Signed: Jesus Puente MD    Providers location when delivering service (clinic, hospital, home): Home        Pursuant to the emergency declaration under the [facility] waiver authority and the Coronavirus Preparedness and Response Supplemental Appropriations Act, this Virtual  Visit was conducted, with patient's consent, to reduce the patient's risk of exposure to COVID-19 and provide continuity of care for an established patient. Services were provided through a video synchronous discussion virtually to substitute for in-person clinic visit. History   Patients past medical, surgical and family histories were reviewed and updated.       Past Medical History:   Diagnosis Date    Diabetes ([facility] [address] 1995    Genital herpes simplex type 2     GERD (gastroesophageal reflux disease)     no medication prescribed    Hypercholesterolemia  Hypertension     Narcolepsy     Neuropathy in diabetes ([facility] [address]     Obesity (BMI 30.0-34. 9)      Past Surgical History:   Procedure Laterality Date    COLONOSCOPY N/A 2019    COLONOSCOPY performed by Charlene Brown MD at [address] HX  SECTION  03/15/1979    HX  SECTION  1990    HX KNEE REPLACEMENT Bilateral 2005    HX MOHS PROCEDURES Right     HX OTHER SURGICAL Right 2019    Excision of lipoma of the right buttock.  HX ROTATOR CUFF REPAIR Right     MD EXTRAC ERUPTED TOOTH/EXPOSED ROOT Right 14    3 UPPER RIGHT SIDE BACK TEETH     Family History   Problem Relation Age of Onset    Diabetes Mother     Heart Disease Mother     Hypertension Mother    Vergara Arthritis-rheumatoid Mother     Diabetes Father     Heart Disease Father     Cancer Maternal Aunt         bone    Diabetes Sister      Social History     Socioeconomic History    Marital status:      Spouse name: Not on file    Number of children: Not on file    Years of education: Not on file    Highest education level: Not on file   Occupational History    Not on file   Social Needs    Financial resource strain: Not on file    Food insecurity     Worry: Not on file     Inability: Not on file    Transportation needs     Medical: Not on file     Non-medical: Not on file   Tobacco Use    Smoking status: Never Smoker    Smokeless tobacco: Never Used   Substance and Sexual Activity    Alcohol use:  Yes     Alcohol/week: 0.0 - 1.0 standard drinks     Frequency: Never    Drug use: No    Sexual activity: Yes     Partners: Male     Birth control/protection: None   Lifestyle    Physical activity     Days per week: Not on file     Minutes per session: Not on file    Stress: Not on file   Relationships    Social connections     Talks on phone: Not on file     Gets together: Not on file     Attends Faith service: Not on file     Active member of club or organization: Not on file     Attends meetings of clubs or organizations: Not on file     Relationship status: Not on file    Intimate partner violence     Fear of current or ex partner: Not on file     Emotionally abused: Not on file     Physically abused: Not on file     Forced sexual activity: Not on file   Other Topics Concern    Not on file   Social History Narrative    Not on file     Patient Active Problem List   Diagnosis Code    Hypertension I10    Diabetes ([facility] [address] E11.9    Hypercholesterolemia E78.00    Other chest pain R07.89    Neuropathy due to secondary diabetes ([address] E13.40    Lumbar stenosis M48.061    Lumbar herniated disc M51.26    Postcoital UTI N39.0    Type 2 diabetes mellitus with diabetic nephropathy, without long-term current use of insulin ([facility] [address] E11.21    Type 2 diabetes mellitus with hyperglycemia, without long-term current use of insulin ([facility] [address] E11.65    Closed nondisplaced fracture of first cervical vertebra ([facility] [address] S12.001A    Closed fracture of multiple ribs of right side with routine healing S22.41XD    Type 2 diabetes mellitus with diabetic neuropathy (HCC) E11.40    HSV-2 infection B00.9    Breast cancer screening Z12.39    Cervical cancer screening Z12.4    Obesity (BMI 30.0-34. 9) E66.9    Shingles rash B02.9    S/P excision of lipoma Z98.890, Z86.018    Severe obesity (HCC) E66.01          Current Medications/Allergies   Medications and Allergies reviewed:    Current Outpatient Medications   Medication Sig Dispense Refill    valACYclovir (VALTREX) 500 mg tablet Take 1 Tab by mouth two (2) times a day for 3 days. Indications: recurrent genital herpes 6 Tab 3    traMADoL (ULTRAM) 50 mg tablet Take 50 mg by mouth two (2) times a day.  pregabalin (LYRICA) 100 mg capsule TAKE 1 CAPSULE PO EVERY 8 HOURS      glipiZIDE (GLUCOTROL) 10 mg tablet TAKE 1 TABLET BY MOUTH TWICE DAILY 180 Tab 3    metFORMIN (GLUCOPHAGE) 1,000 mg tablet Take 1 Tab by mouth two (2) times daily (with meals).  180 Tab 3    Trulicity 1.5 BG/2.8 mL sub-q pen INJECT 1 SYRINGE UNDER THE SKIN EVERY 7 DAYS 6 mL 4    DULoxetine (CYMBALTA) 30 mg capsule TK ONE C PO  Q 12 H      ammonium lactate (LAC-HYDRIN) 12 % lotion JEMLA EXT TO ALL SURFACES OF BOTH FEET BID      rosuvastatin (CRESTOR) 10 mg tablet TAKE 1 TABLET BY MOUTH DAILY 90 Tab 3    amLODIPine-benazepril (LOTREL) 10-20 mg per capsule Take 1 Cap by mouth daily. 90 Cap 3    hydroCHLOROthiazide (HYDRODIURIL) 25 mg tablet TAKE 1 TABLET BY MOUTH DAILY 90 Tab 3    cyanocobalamin (VITAMIN B12) 100 mcg tablet Take 1 Tab by mouth daily. 90 Tab 3    varicella-zoster recombinant, PF, (SHINGRIX, PF,) 50 mcg/0.5 mL susr injection 0.5mL by IntraMUSCular route once now and then repeat in 2-6 months 0.5 mL 1    naloxone (NARCAN) 2 mg/actuation spry Use 1 spray intranasally, then discard. Repeat with new spray every 2 min as needed for opioid overdose symptoms, alternating nostrils. 1 Vial 1    famotidine (PEPCID) 40 mg tablet Take 1 Tab by mouth daily. 30 Tab 3    Blood-Glucose Meter (ACCU-CHEK GEORGINA PLUS METER) misc Test once daily Dx Code: E11.65 1 Each 0    glucose blood VI test strips (ACCU-CHEK GEORGINA PLUS TEST STRP) strip Test once daily Dx Code: E11.65 100 Strip 3    lancets (ACCU-CHEK FASTCLIX LANCET DRUM) misc Test once daily Dx Code: E11.65 100 Each 3    peg 400-propylene glycol (SYSTANE, PROPYLENE GLYCOL,) 0.4-0.3 % drop Administer 1 Drop to both eyes as needed.  nystatin (MYCOSTATIN) topical cream Apply  to affected area two (2) times a day. 60 g 1    omega-3 fatty acids-vitamin e (FISH OIL) 1,000 mg Cap Take 1 Cap by mouth daily.        Allergies   Allergen Reactions    Aspirin Hives     Tolerates ibuprofen and naproxen    Clindamycin Rash and Hives

## 2020-07-01 NOTE — PATIENT INSTRUCTIONS
Genital Herpes: Care Instructions Your Care Instructions Genital herpes is caused by a virus called herpes simplex. There are two types of this virus. Type 2 is the type that usually causes genital herpes. But type 1 can also cause it. Type 1 is the type that causes cold sores. Genital herpes is a sexually transmitted infection (STI). The most common way to get it is through sexual or other contact with someone who has herpes. For example, the virus can spread from a sore in the genital area to the lips. And it can spread from a cold sore on the lips to the genital area. Some people are surprised to find out that they have herpes or that they gave it to someone else. This is because a lot of people who have it don't know that they have it. They may not get sores or they may have sores that they cannot see. But the virus can still be spread by a person who does not have obvious sores or symptoms. There is no cure for herpes, but antiviral medicine can help you feel better and help prevent more outbreaks. This medicine may also lower the chance of spreading the virus. Follow-up care is a key part of your treatment and safety. Be sure to make and go to all appointments, and call your doctor if you are having problems. It's also a good idea to know your test results and keep a list of the medicines you take. How can you care for yourself at home? · Be safe with medicines. If your doctor prescribes medicine, take it exactly as prescribed. Call your doctor if you think you are having a problem with your medicine. You will get more details on the specific medicines your doctor prescribes. · To reduce the pain and itching from herpes sores: 
? Wash the area with water 3 or 4 times a day. ? Keep the sores clean and dry in between baths or showers. You may want to let the sores air dry. This may feel better than a towel. ? Wear cotton underwear. Cotton absorbs moisture well. ? Take an over-the-counter pain medicine, such as acetaminophen (Tylenol), ibuprofen (Advil, Motrin), or naproxen (Aleve). Read and follow all instructions on the label. Do not take two or more pain medicines at the same time unless the doctor told you to. Many pain medicines have acetaminophen, which is Tylenol. Too much acetaminophen (Tylenol) can be harmful. To prevent the spread of genital herpes · Latex condoms are a good way to reduce the risk of spreading the virus. But you can still get the virus even if you use a condom. For the best protection, use condoms every time you have sex, from the beginning to the end of sexual contact. Remember that you can infect someone even if you do not have obvious symptoms or sores. · Avoid sexual contact when you have symptoms. Symptoms include sores, tingling, or pain. · Wash your hands if you touch a sore. In some cases, especially if this is your first herpes outbreak, you can spread the virus to other parts of your body or to other people. · Having one sex partner (who does not have STIs and does not have sex with anyone else) is a good way to avoid STIs. · Talk to your sex partner or partners about genital herpes. · Encourage your sex partners to get a blood test to see if they have been infected. When should you call for help? Call your doctor now or seek immediate medical care if: 
· You have a new fever. · There is increasing redness or red streaks around herpes sores. Watch closely for changes in your health, and be sure to contact your doctor if: 
· You have herpes and you think you might be pregnant. · You have an outbreak of herpes sores, and the sores are not healing. · You have frequent outbreaks of genital herpes sores. · You are unable to pass urine or are constipated. · You want to start antiviral medicine. · You do not get better as expected. Where can you learn more? Go to http://yesenia-anshu.info/ Enter Q084 in the search box to learn more about \"Genital Herpes: Care Instructions. \" Current as of: February 26, 2020               Content Version: 12.5 © 2006-2020 [employer]. Care instructions adapted under license by [employer] (which disclaims liability or warranty for this information). If you have questions about a medical condition or this instruction, always ask your healthcare professional. [place] [address] any warranty or liability for your use of this information.

## 2020-07-03 NOTE — PROGRESS NOTES
[address] Medicine Residency Attending Addendum:  Dr. Jade Armas MD,  the patient and I were not physically present during this encounter. The resident and I are concurrently monitoring the patient care through appropriate telecommunication technology. I discussed the findings, assessment and plan with the resident and agree with the resident's findings and plan as documented in the resident's note.       Rubens Harmon MD

## 2020-08-21 LAB
ALBUMIN SERPL-MCNC: 4.3 G/DL (ref 3.8–4.8)
ALBUMIN/GLOB SERPL: 1.9 {RATIO} (ref 1.2–2.2)
ALP SERPL-CCNC: 105 IU/L (ref 39–117)
ALT SERPL-CCNC: 19 IU/L (ref 0–32)
AST SERPL-CCNC: 21 IU/L (ref 0–40)
BILIRUB SERPL-MCNC: 0.2 MG/DL (ref 0–1.2)
BUN SERPL-MCNC: 17 MG/DL (ref 8–27)
BUN/CREAT SERPL: 13 (ref 12–28)
CALCIUM SERPL-MCNC: 9.3 MG/DL (ref 8.7–10.3)
CHLORIDE SERPL-SCNC: 105 MMOL/L (ref 96–106)
CO2 SERPL-SCNC: 26 MMOL/L (ref 20–29)
CREAT SERPL-MCNC: 1.3 MG/DL (ref 0.57–1)
EST. AVERAGE GLUCOSE BLD GHB EST-MCNC: 151 MG/DL
GLOBULIN SER CALC-MCNC: 2.3 G/DL (ref 1.5–4.5)
GLUCOSE SERPL-MCNC: 153 MG/DL (ref 65–99)
HBA1C MFR BLD: 6.9 % (ref 4.8–5.6)
INTERPRETATION: NORMAL
LDLC SERPL DIRECT ASSAY-MCNC: 59 MG/DL (ref 0–99)
Lab: NORMAL
POTASSIUM SERPL-SCNC: 4.6 MMOL/L (ref 3.5–5.2)
PROT SERPL-MCNC: 6.6 G/DL (ref 6–8.5)
SODIUM SERPL-SCNC: 145 MMOL/L (ref 134–144)

## 2020-08-27 NOTE — TELEPHONE ENCOUNTER
Called patient and notified. Subjective   Patient ID: David is a 72 year old male.    Chief Complaint   Patient presents with   • Anxiety   • Telephone Visit     Past Medical History:   Diagnosis Date   • Brain tumor (benign) (CMS/HCC)    • COPD (chronic obstructive pulmonary disease) (CMS/[facility])    • Kidney damage    • Schwannoma      Past Surgical History:   Procedure Laterality Date   • Third ventriculostomy       Social History     Socioeconomic History   • Marital status: /Civil Union     Spouse name: Not on file   • Number of children: Not on file   • Years of education: Not on file   • Highest education level: Not on file   Occupational History   • Not on file   Social Needs   • Financial resource strain: Not on file   • Food insecurity     Worry: Not on file     Inability: Not on file   • Transportation needs     Medical: Not on file     Non-medical: Not on file   Tobacco Use   • Smoking status: Current Every Day Smoker     Types: Cigars   • Smokeless tobacco: Never Used   Substance and Sexual Activity   • Alcohol use: Yes     Frequency: Monthly or less   • Drug use: No   • Sexual activity: Not on file   Lifestyle   • Physical activity     Days per week: Not on file     Minutes per session: Not on file   • Stress: Not on file   Relationships   • Social connections     Talks on phone: Not on file     Gets together: Not on file     Attends Congregation service: Not on file     Active member of club or organization: Not on file     Attends meetings of clubs or organizations: Not on file     Relationship status: Not on file   • Intimate partner violence     Fear of current or ex partner: Not on file     Emotionally abused: Not on file     Physically abused: Not on file     Forced sexual activity: Not on file   Other Topics Concern   • Not on file   Social History Narrative   • Not on file     No family history on file.  ALLERGIES:  No Known Allergies  Current Outpatient Medications   Medication Sig   • lamoTRIgine (LaMICtal) 25 MG tablet  on [employer], DASIA from [employer], RABIA from [employer]    

## 2020-09-17 ENCOUNTER — OFFICE VISIT (OUTPATIENT)
Dept: ENDOCRINOLOGY | Age: 65
End: 2020-09-17
Payer: MEDICARE

## 2020-09-17 VITALS
OXYGEN SATURATION: 100 % | HEIGHT: 63 IN | HEART RATE: 72 BPM | SYSTOLIC BLOOD PRESSURE: 133 MMHG | RESPIRATION RATE: 18 BRPM | TEMPERATURE: 96.6 F | BODY MASS INDEX: 35.44 KG/M2 | WEIGHT: 200 LBS | DIASTOLIC BLOOD PRESSURE: 72 MMHG

## 2020-09-17 DIAGNOSIS — E66.01 SEVERE OBESITY (HCC): ICD-10-CM

## 2020-09-17 DIAGNOSIS — E78.2 MIXED HYPERLIPIDEMIA: ICD-10-CM

## 2020-09-17 DIAGNOSIS — I10 ESSENTIAL HYPERTENSION: ICD-10-CM

## 2020-09-17 DIAGNOSIS — E11.21 TYPE 2 DIABETES MELLITUS WITH DIABETIC NEPHROPATHY, WITHOUT LONG-TERM CURRENT USE OF INSULIN (HCC): Primary | ICD-10-CM

## 2020-09-17 PROCEDURE — G8754 DIAS BP LESS 90: HCPCS | Performed by: INTERNAL MEDICINE

## 2020-09-17 PROCEDURE — 3017F COLORECTAL CA SCREEN DOC REV: CPT | Performed by: INTERNAL MEDICINE

## 2020-09-17 PROCEDURE — G8417 CALC BMI ABV UP PARAM F/U: HCPCS | Performed by: INTERNAL MEDICINE

## 2020-09-17 PROCEDURE — 2022F DILAT RTA XM EVC RTNOPTHY: CPT | Performed by: INTERNAL MEDICINE

## 2020-09-17 PROCEDURE — G9899 SCRN MAM PERF RSLTS DOC: HCPCS | Performed by: INTERNAL MEDICINE

## 2020-09-17 PROCEDURE — G8752 SYS BP LESS 140: HCPCS | Performed by: INTERNAL MEDICINE

## 2020-09-17 PROCEDURE — G8427 DOCREV CUR MEDS BY ELIG CLIN: HCPCS | Performed by: INTERNAL MEDICINE

## 2020-09-17 PROCEDURE — 99214 OFFICE O/P EST MOD 30 MIN: CPT | Performed by: INTERNAL MEDICINE

## 2020-09-17 PROCEDURE — G8432 DEP SCR NOT DOC, RNG: HCPCS | Performed by: INTERNAL MEDICINE

## 2020-09-17 PROCEDURE — 3044F HG A1C LEVEL LT 7.0%: CPT | Performed by: INTERNAL MEDICINE

## 2020-09-17 NOTE — LETTER
9/19/20 Patient: Mode Hairston YOB: 1955 Date of Visit: 9/17/2020 Candy Estrada MD 
VIA In Basket Dear Candy Estrada MD, Thank you for referring Ms. Audie Estes to [address] [place] [address] for evaluation. My notes for this consultation are attached. If you have questions, please do not hesitate to call me. I look forward to following your patient along with you. Sincerely, Calvin Chacko MD

## 2020-09-17 NOTE — PROGRESS NOTES
Libia Morton is a 59 y.o. female here for   Chief Complaint   Patient presents with    Diabetes       1. Have you been to the ER, urgent care clinic since your last visit? Hospitalized since your last visit? -no    2. Have you seen or consulted any other health care providers outside of the [address] [place] [address] since your last visit?   Include any pap smears or colon screening.-Podiatry    Has had cortisone shots in back
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
## 2020-12-01 ENCOUNTER — OFFICE VISIT (OUTPATIENT)
Dept: FAMILY MEDICINE CLINIC | Age: 65
End: 2020-12-01
Payer: MEDICARE

## 2020-12-01 VITALS
TEMPERATURE: 96.5 F | WEIGHT: 204 LBS | BODY MASS INDEX: 36.14 KG/M2 | OXYGEN SATURATION: 100 % | HEIGHT: 63 IN | DIASTOLIC BLOOD PRESSURE: 71 MMHG | SYSTOLIC BLOOD PRESSURE: 113 MMHG | HEART RATE: 103 BPM | RESPIRATION RATE: 17 BRPM

## 2020-12-01 DIAGNOSIS — L03.115 CELLULITIS OF RIGHT LOWER EXTREMITY: ICD-10-CM

## 2020-12-01 DIAGNOSIS — L02.415 CUTANEOUS ABSCESS OF RIGHT LOWER EXTREMITY: Primary | ICD-10-CM

## 2020-12-01 PROCEDURE — 10060 I&D ABSCESS SIMPLE/SINGLE: CPT | Performed by: STUDENT IN AN ORGANIZED HEALTH CARE EDUCATION/TRAINING PROGRAM

## 2020-12-01 PROCEDURE — 96372 THER/PROPH/DIAG INJ SC/IM: CPT | Performed by: STUDENT IN AN ORGANIZED HEALTH CARE EDUCATION/TRAINING PROGRAM

## 2020-12-01 PROCEDURE — 99214 OFFICE O/P EST MOD 30 MIN: CPT | Performed by: STUDENT IN AN ORGANIZED HEALTH CARE EDUCATION/TRAINING PROGRAM

## 2020-12-01 RX ORDER — CEPHALEXIN 500 MG/1
500 CAPSULE ORAL 2 TIMES DAILY
Qty: 20 CAP | Refills: 0 | Status: SHIPPED | OUTPATIENT
Start: 2020-12-01 | End: 2020-12-11

## 2020-12-01 RX ORDER — LIDOCAINE HYDROCHLORIDE AND EPINEPHRINE 10; 10 MG/ML; UG/ML
2 INJECTION, SOLUTION INFILTRATION; PERINEURAL ONCE
Status: SHIPPED | OUTPATIENT
Start: 2020-12-01 | End: 2020-12-02

## 2020-12-01 RX ORDER — LIDOCAINE HYDROCHLORIDE AND EPINEPHRINE 10; 10 MG/ML; UG/ML
2 INJECTION, SOLUTION INFILTRATION; PERINEURAL ONCE
Status: COMPLETED | OUTPATIENT
Start: 2020-12-01 | End: 2020-12-01

## 2020-12-01 RX ADMIN — LIDOCAINE HYDROCHLORIDE AND EPINEPHRINE 2 ML: 10; 10 INJECTION, SOLUTION INFILTRATION; PERINEURAL at 16:10

## 2020-12-01 NOTE — PROGRESS NOTES
[address] [facility] [address] [place] [address]   Office [phone], Fax [phone]    Subjective:   Lindsay Thompson is a 72 y.o. female   CC:   Chief Complaint   Patient presents with    Rash     abd     History provided by patient     HPI:  Skin infection  Patient reports that it may be there for about a week but she is uncertain as it has not bothered her until yesterday. She reports that it is a burning sensation now. Has not tried anything on it. Never had an abscess before and she has never had I&D. It hurts the most when clothing or something rubs on it and she would like to get it drained. Past Medical History:   Diagnosis Date    Diabetes ([facility] 1995    Genital herpes simplex type 2     GERD (gastroesophageal reflux disease)     no medication prescribed    Hypercholesterolemia     Hypertension     Narcolepsy     Neuropathy in diabetes ([facility]     Obesity (BMI 30.0-34. 9)      Allergies   Allergen Reactions    Aspirin Hives     Tolerates ibuprofen and naproxen    Clindamycin Rash and Hives     Current Outpatient Medications on File Prior to Visit   Medication Sig Dispense Refill    traMADoL (ULTRAM) 50 mg tablet Take 50 mg by mouth two (2) times a day.  pregabalin (LYRICA) 100 mg capsule Take 100 mg by mouth two (2) times a day.  glipiZIDE (GLUCOTROL) 10 mg tablet TAKE 1 TABLET BY MOUTH TWICE DAILY 180 Tab 3    metFORMIN (GLUCOPHAGE) 1,000 mg tablet Take 1 Tab by mouth two (2) times daily (with meals). 180 Tab 3    ammonium lactate (LAC-HYDRIN) 12 % lotion JEMAL EXT TO ALL SURFACES OF BOTH FEET BID      rosuvastatin (CRESTOR) 10 mg tablet TAKE 1 TABLET BY MOUTH DAILY 90 Tab 3    amLODIPine-benazepril (LOTREL) 10-20 mg per capsule Take 1 Cap by mouth daily. 90 Cap 3    hydroCHLOROthiazide (HYDRODIURIL) 25 mg tablet TAKE 1 TABLET BY MOUTH DAILY 90 Tab 3    cyanocobalamin (VITAMIN B12) 100 mcg tablet Take 1 Tab by mouth daily.  90 Tab 3    Blood-Glucose Meter (ACCU-CHEK GEORGINA PLUS METER) misc Test once daily Dx Code: E11.65 1 Each 0    glucose blood VI test strips (ACCU-CHEK GEORGINA PLUS TEST STRP) strip Test once daily Dx Code: E11.65 100 Strip 3    lancets (ACCU-CHEK FASTCLIX LANCET DRUM) misc Test once daily Dx Code: E11.65 100 Each 3    peg 400-propylene glycol (SYSTANE, PROPYLENE GLYCOL,) 0.4-0.3 % drop Administer 1 Drop to both eyes as needed.  nystatin (MYCOSTATIN) topical cream Apply  to affected area two (2) times a day. 60 g 1    omega-3 fatty acids-vitamin e (FISH OIL) 1,000 mg Cap Take 1 Cap by mouth daily.  Trulicity 1.5 LW/3.6 mL sub-q pen INJECT 1 SYRINGE UNDER THE SKIN EVERY 7 DAYS 6 mL 4    DULoxetine (CYMBALTA) 30 mg capsule TK ONE C PO  Q 12 H      naloxone (NARCAN) 2 mg/actuation spry Use 1 spray intranasally, then discard. Repeat with new spray every 2 min as needed for opioid overdose symptoms, alternating nostrils. 1 Vial 1     No current facility-administered medications on file prior to visit. Family History   Problem Relation Age of Onset    Diabetes Mother     Heart Disease Mother     Hypertension Mother    [employer] Ar[employer]ritis-rheumatoid Mother     Diabetes Father     Heart Disease Father     Cancer Maternal Aunt         bone    Diabetes Sister      Social History     Socioeconomic History    Marital status:      Spouse name: Not on file    Number of children: Not on file    Years of education: Not on file    Highest education level: Not on file   Tobacco Use    Smoking status: Never Smoker    Smokeless tobacco: Never Used   Substance and Sexual Activity    Alcohol use:  Yes     Alcohol/week: 0.0 - 1.0 standard drinks     Frequency: Never    Drug use: No    Sexual activity: Yes     Partners: Male     Birth control/protection: None     Past Surgical History:   Procedure Laterality Date    COLONOSCOPY N/A 2019    COLONOSCOPY performed by Breana Vincent MD at [facility] HX  SECTION  03/15/1979   Phineas Began  SECTION  1990    HX KNEE REPLACEMENT Bilateral 2005    HX MOHS PROCEDURES Right 2007    HX OTHER SURGICAL Right 2019    Excision of lipoma of the right buttock.  HX ROTATOR CUFF REPAIR Right     MS EXTRAC ERUPTED TOOTH/EXPOSED ROOT Right 14    3 UPPER RIGHT SIDE BACK TEETH       Patient Active Problem List   Diagnosis Code    Hypertension I10    Diabetes ([facility] E11.9    Hypercholesterolemia E78.00    Other chest pain R07.89    Neuropathy due to secondary diabetes ([facility] E13.40    Lumbar stenosis M48.061    Lumbar herniated disc M51.26    Postcoital UTI N39.0    Type 2 diabetes mellitus with diabetic nephropathy, without long-term current use of insulin (HCC) E11.21    Type 2 diabetes mellitus with hyperglycemia, without long-term current use of insulin ([facility]) E11.65    Closed nondisplaced fracture of first cervical vertebra ([facility]) S12.001A    Closed fracture of multiple ribs of right side with routine healing S22.41XD    Type 2 diabetes mellitus with diabetic neuropathy ([facility]) E11.40    HSV-2 infection B00.9    Breast cancer screening Z12.39    Cervical cancer screening Z12.4    Obesity (BMI 30.0-34. 9) E66.9    Shingles rash B02.9    S/P excision of lipoma Z98.890, Z86.018    Severe obesity (HCC) E66.01           Review of Systems   Constitutional: Negative for chills and fever. Gastrointestinal: Negative for abdominal pain, nausea and vomiting. Skin: Positive for rash. Negative for itching. Objective:     Visit Vitals  /71   Pulse (!) 103   Temp (!) 96.5 °F (35.8 °C) (Temporal)   Resp 17   Ht 5' 3\" (1.6 m)   Wt 204 lb (92.5 kg)   LMP 2008 (LMP Unknown)   SpO2 100%   BMI 36.14 kg/m²            Physical Exam  Vitals signs and nursing note reviewed. Constitutional:       General: She is not in acute distress. Appearance: Normal appearance. HENT:      Head: Normocephalic and atraumatic. Abdominal:      Palpations: Abdomen is soft. Tenderness: There is no abdominal tenderness. Comments: Obese abdomen    Skin:     Comments: See picture. Right upper thigh near groin has abscess that is tender to palpation with surrounding erythema. Neurological:      Mental Status: She is alert. [facility]  OFFICE PROCEDURE PROGRESS NOTE        Chart reviewed for the following:   Bea Prakash MD, have reviewed the History, Physical and updated the Allergic reactions for Paul Mariscal     TIME OUT performed immediately prior to start of procedure:   Bea Prakash MD, have performed the following reviews on Gini Six prior to the start of the procedure:            * Patient was identified by name and date of birth   * Agreement on procedure being performed was verified  * Risks and Benefits explained to the patient  * Procedure site verified and marked as necessary  * Patient was positioned for comfort  * Consent was signed and verified     Time: 11:20am      Date of procedure: 12/1/2020    Procedure performed by:  Vaibhav Kee MD    Provider assisted by: Erwin Chappell LPN     Patient assisted by: self    How tolerated by patient: tolerated the procedure well with no complications    Post Procedural Pain Scale: 0 - No Hurt      I&D Abcess Simple  Consent: Verbal consent obtained. Written consent obtained. Performed by: Vaibhav Kee MD  Preparation: skin prepped with Chloraprep and sterile field established  Pre-procedure re-eval: Immediately prior to the procedure, the patient was reevaluated and found suitable for the planned procedure and any planned medications. Time out: Immediately prior to the procedure a time out was called to verify the correct patient, procedure, equipment, staff and marking as appropriate. .  Location details: Right upper thigh  Local anesthetic: lidocaine 1% with epinephrine  Anesthetic total: 2ml  Scalpel size: 10  Incision type: single incision  Complexity: Simple  Drainage: purulent and bloody  Drainage amount: copious  Wound treatment: wound left open and expression of material  Packing material:None  Post-procedure: dressing applied  Patient tolerance: Patient tolerated the procedure well with no immediate complications. My total time at bedside, performing this procedure was 16-30 minutes. Assessment and orders:     Juan Jose Kurtz is a 72 y.o. BLACK OR  female presents with:    1. Cutaneous abscess of right lower extremity  1 week of abscess on Right upper thigh. 1 day of pain and redness. Has not tried anything. Patient had I&D and tolerated procedure well. Wound Cx sent. Will have patient return in 2 days for wound check. There is surrounding erythema concerning for cellulitis also. Will treat with Keflex. ED precautions given. - DRAIN SKIN ABSCESS SIMPLE  - CULTURE, WOUND W GRAM STAIN; Future  - cephALEXin (KEFLEX) 500 mg capsule; Take 1 Cap by mouth two (2) times a day for 10 days. Indications: skin infection due to Streptococcus pyogenes bacteria  Dispense: 20 Cap; Refill: 0  - lidocaine-EPINEPHrine (XYLOCAINE) 1 %-1:100,000 injection 20 mg    2. Cellulitis of right lower extremity  - CULTURE, WOUND W GRAM STAIN; Future  - cephALEXin (KEFLEX) 500 mg capsule; Take 1 Cap by mouth two (2) times a day for 10 days. Indications: skin infection due to Streptococcus pyogenes bacteria  Dispense: 20 Cap; Refill: 0      Follow up: 2 days for wound check    I have reviewed patient medical and social history and medications. I have reviewed pertinent labs results and other data. I have discussed the diagnosis with the patient and the intended plan as seen in the above orders. The patient has received an after-visit summary and questions were answered concerning future plans. I have discussed medication side effects and warnings with the patient as well.     Patient discussed and seen with Dr. Allison Pandya, Attending Physician    Jessee Block MD  [address] [place] [address] Family Medicine Resident  12/01/20

## 2020-12-01 NOTE — PROGRESS NOTES
Chief Complaint   Patient presents with    Rash     abd     1. Have you been to the ER, urgent care clinic since your last visit? Hospitalized since your last visit? No    2. Have you seen or consulted any other health care providers outside of the [address] [place] [address] since your last visit? Include any pap smears or colon screening.  No      [facility]  OFFICE PROCEDURE PROGRESS NOTE        Chart reviewed for the following:   Jered BRODERICK LPN, have reviewed the History, Physical and updated the Allergic reactions for Tryphena MACIEJ Mariscal     TIME OUT performed immediately prior to start of procedure:   Altagracia Armenta LPN, have performed the following reviews on Lindsay Thompson prior to the start of the procedure:            * Patient was identified by name and date of birth   * Agreement on procedure being performed was verified  * Risks and Benefits explained to the patient  * Procedure site verified and marked as necessary  * Patient was positioned for comfort  * Consent was signed and verified     Time: 11:20AM      Date of procedure: 12/1/2020    Procedure performed by:  Candelaria Walker MD    Provider assisted by: Domenico Perez LPN    Patient assisted by: self    How tolerated by patient: tolerated the procedure well with no complications    Post Procedural Pain Scale: 0 - No Hurt    Comments: none

## 2020-12-04 ENCOUNTER — OFFICE VISIT (OUTPATIENT)
Dept: FAMILY MEDICINE CLINIC | Age: 65
End: 2020-12-04
Payer: MEDICARE

## 2020-12-04 ENCOUNTER — TELEPHONE (OUTPATIENT)
Dept: FAMILY MEDICINE CLINIC | Age: 65
End: 2020-12-04

## 2020-12-04 VITALS
HEART RATE: 104 BPM | SYSTOLIC BLOOD PRESSURE: 126 MMHG | WEIGHT: 201.5 LBS | RESPIRATION RATE: 20 BRPM | BODY MASS INDEX: 35.69 KG/M2 | OXYGEN SATURATION: 95 % | TEMPERATURE: 97.8 F | DIASTOLIC BLOOD PRESSURE: 79 MMHG

## 2020-12-04 DIAGNOSIS — L02.91 ABSCESS: Primary | ICD-10-CM

## 2020-12-04 DIAGNOSIS — L08.9 INFECTION OF SKIN DUE TO METHICILLIN RESISTANT STAPHYLOCOCCUS AUREUS (MRSA): ICD-10-CM

## 2020-12-04 DIAGNOSIS — B95.62 INFECTION OF SKIN DUE TO METHICILLIN RESISTANT STAPHYLOCOCCUS AUREUS (MRSA): ICD-10-CM

## 2020-12-04 DIAGNOSIS — L02.214 SOFT TISSUE ABSCESS OF INGUINAL REGION: ICD-10-CM

## 2020-12-04 LAB
BACTERIA SPEC CULT: ABNORMAL
GRAM STN SPEC: ABNORMAL
GRAM STN SPEC: ABNORMAL
SERVICE CMNT-IMP: ABNORMAL

## 2020-12-04 PROCEDURE — G8417 CALC BMI ABV UP PARAM F/U: HCPCS | Performed by: STUDENT IN AN ORGANIZED HEALTH CARE EDUCATION/TRAINING PROGRAM

## 2020-12-04 PROCEDURE — G9899 SCRN MAM PERF RSLTS DOC: HCPCS | Performed by: STUDENT IN AN ORGANIZED HEALTH CARE EDUCATION/TRAINING PROGRAM

## 2020-12-04 PROCEDURE — G8427 DOCREV CUR MEDS BY ELIG CLIN: HCPCS | Performed by: STUDENT IN AN ORGANIZED HEALTH CARE EDUCATION/TRAINING PROGRAM

## 2020-12-04 PROCEDURE — G8536 NO DOC ELDER MAL SCRN: HCPCS | Performed by: STUDENT IN AN ORGANIZED HEALTH CARE EDUCATION/TRAINING PROGRAM

## 2020-12-04 PROCEDURE — 1101F PT FALLS ASSESS-DOCD LE1/YR: CPT | Performed by: STUDENT IN AN ORGANIZED HEALTH CARE EDUCATION/TRAINING PROGRAM

## 2020-12-04 PROCEDURE — 1090F PRES/ABSN URINE INCON ASSESS: CPT | Performed by: STUDENT IN AN ORGANIZED HEALTH CARE EDUCATION/TRAINING PROGRAM

## 2020-12-04 PROCEDURE — G8432 DEP SCR NOT DOC, RNG: HCPCS | Performed by: STUDENT IN AN ORGANIZED HEALTH CARE EDUCATION/TRAINING PROGRAM

## 2020-12-04 PROCEDURE — 99214 OFFICE O/P EST MOD 30 MIN: CPT | Performed by: STUDENT IN AN ORGANIZED HEALTH CARE EDUCATION/TRAINING PROGRAM

## 2020-12-04 PROCEDURE — G8752 SYS BP LESS 140: HCPCS | Performed by: STUDENT IN AN ORGANIZED HEALTH CARE EDUCATION/TRAINING PROGRAM

## 2020-12-04 PROCEDURE — 3017F COLORECTAL CA SCREEN DOC REV: CPT | Performed by: STUDENT IN AN ORGANIZED HEALTH CARE EDUCATION/TRAINING PROGRAM

## 2020-12-04 PROCEDURE — G8400 PT W/DXA NO RESULTS DOC: HCPCS | Performed by: STUDENT IN AN ORGANIZED HEALTH CARE EDUCATION/TRAINING PROGRAM

## 2020-12-04 PROCEDURE — G8754 DIAS BP LESS 90: HCPCS | Performed by: STUDENT IN AN ORGANIZED HEALTH CARE EDUCATION/TRAINING PROGRAM

## 2020-12-04 RX ORDER — DOXYCYCLINE HYCLATE 100 MG
100 TABLET ORAL 2 TIMES DAILY
Qty: 20 TAB | Refills: 0 | Status: SHIPPED | OUTPATIENT
Start: 2020-12-04 | End: 2020-12-14

## 2020-12-04 RX ORDER — DOXYCYCLINE 100 MG/1
100 TABLET ORAL 2 TIMES DAILY
Qty: 10 TAB | Refills: 0 | Status: SHIPPED | OUTPATIENT
Start: 2020-12-04 | End: 2020-12-05

## 2020-12-04 NOTE — PROGRESS NOTES
CC: Follow up on abscess    Subjective   Pb Rojas is an 72 y.o. female presents for abscess follow up after I&D on 12/01. The patient was prescribed Keflex 500 mg BID for 10 days, she has been taking abxs for 3 days. She denies endorses serosanguineous discharge. Denies purulent or bloody discharge or strong odor. Cx sent are+ moderate staph aureus. Pt refers improvement of pain. She denies fever, chills, nausea or vomiting. The pt reports a new lesion under her left axilla that she discovered yesterday in the shower associated with tenderness w/o discharge. Patient denies using razors. Review of Systems   Review of Systems   Constitutional: Negative for chills and fever. HENT: Negative. Eyes: Negative. Respiratory: Negative. Cardiovascular: Negative. Gastrointestinal: Negative. Genitourinary: Negative. Musculoskeletal: Negative. Skin: Negative for itching. Neurological: Negative. Endo/Heme/Allergies: Negative. Psychiatric/Behavioral: Negative. Allergies   Allergies   Allergen Reactions    Aspirin Hives     Tolerates ibuprofen and naproxen    Clindamycin Rash and Hives       Medications  Current Outpatient Medications   Medication Sig    doxycycline (VIBRA-TABS) 100 mg tablet Take 1 Tab by mouth two (2) times a day for 10 days.  cephALEXin (KEFLEX) 500 mg capsule Take 1 Cap by mouth two (2) times a day for 10 days. Indications: skin infection due to Streptococcus pyogenes bacteria    traMADoL (ULTRAM) 50 mg tablet Take 50 mg by mouth two (2) times a day.  pregabalin (LYRICA) 100 mg capsule Take 100 mg by mouth two (2) times a day.  glipiZIDE (GLUCOTROL) 10 mg tablet TAKE 1 TABLET BY MOUTH TWICE DAILY    metFORMIN (GLUCOPHAGE) 1,000 mg tablet Take 1 Tab by mouth two (2) times daily (with meals).     Trulicity 1.5 WZ/7.2 mL sub-q pen INJECT 1 SYRINGE UNDER THE SKIN EVERY 7 DAYS    ammonium lactate (LAC-HYDRIN) 12 % lotion JEMAL EXT TO ALL SURFACES OF BOTH FEET BID    rosuvastatin (CRESTOR) 10 mg tablet TAKE 1 TABLET BY MOUTH DAILY    amLODIPine-benazepril (LOTREL) 10-20 mg per capsule Take 1 Cap by mouth daily.  hydroCHLOROthiazide (HYDRODIURIL) 25 mg tablet TAKE 1 TABLET BY MOUTH DAILY    cyanocobalamin (VITAMIN B12) 100 mcg tablet Take 1 Tab by mouth daily.  Blood-Glucose Meter (ACCU-CHEK GEORGINA PLUS METER) misc Test once daily Dx Code: E11.65    glucose blood VI test strips (ACCU-CHEK GEORGINA PLUS TEST STRP) strip Test once daily Dx Code: E11.65    lancets (ACCU-CHEK FASTCLIX LANCET DRUM) misc Test once daily Dx Code: E11.65    peg 400-propylene glycol (SYSTANE, PROPYLENE GLYCOL,) 0.4-0.3 % drop Administer 1 Drop to both eyes as needed.  nystatin (MYCOSTATIN) topical cream Apply  to affected area two (2) times a day.  omega-3 fatty acids-vitamin e (FISH OIL) 1,000 mg Cap Take 1 Cap by mouth daily.  DULoxetine (CYMBALTA) 30 mg capsule TK ONE C PO  Q 12 H    naloxone (NARCAN) 2 mg/actuation spry Use 1 spray intranasally, then discard. Repeat with new spray every 2 min as needed for opioid overdose symptoms, alternating nostrils. No current facility-administered medications for this visit. Medical History  Past Medical History:   Diagnosis Date    Diabetes ([address]     Genital herpes simplex type 2     GERD (gastroesophageal reflux disease)     no medication prescribed    Hypercholesterolemia     Hypertension     Narcolepsy     [facility]     Obesity (BMI 30.0-34. 9)        Immunizations   Immunization History   Administered Date(s) Administered    Pneumococcal Polysaccharide (PPSV-23) 2016    Tdap 2018          Past Surgical History:   Procedure Laterality Date    COLONOSCOPY N/A 2019    COLONOSCOPY performed by Chantelle Rudolph MD at [address] HX  SECTION  03/15/1979    HX  SECTION  1990    HX KNEE REPLACEMENT Bilateral     HX MOHS PROCEDURES Right   HX OTHER SURGICAL Right 07/01/2019    Excision of lipoma of the right buttock.  HX ROTATOR CUFF REPAIR Right     SD EXTRAC ERUPTED TOOTH/EXPOSED ROOT Right 05/19/14    3 UPPER RIGHT SIDE BACK TEETH     Family History   Problem Relation Age of Onset    Diabetes Mother     Heart Disease Mother     Hypertension Mother    [facility] Arthritis-rheumatoid Mother     Diabetes Father     Heart Disease Father     Cancer Maternal Aunt         bone    Diabetes Sister      Social History     Tobacco Use    Smoking status: Never Smoker    Smokeless tobacco: Never Used   Substance Use Topics    Alcohol use: Yes     Alcohol/week: 0.0 - 1.0 standard drinks     Frequency: Never       Objective   Vital Signs  Visit Vitals  /79 (BP 1 Location: Left arm, BP Patient Position: Sitting)   Pulse (!) 104   Temp 97.8 °F (36.6 °C) (Temporal)   Resp 20   Wt 201 lb 8 oz (91.4 kg)   LMP 01/01/2008 (LMP Unknown)   SpO2 95%   BMI 35.69 kg/m²       Physical Exam  General appearance - Alert, NAD. Head: Atraumatic. Normocephalic. No lymphadenopathy  Respiratory - LCTAB. No wheeze/rale/rhonchi  Heart - Normal rate, regular rhythm. No m/r/r  Skin - lesion is covered with purulent secretions, no bleeding or discharge with pressure. Axillary lesions is ~1 cm, firm w/o puncture site or surrounding erythema. Ext: no LE edema. Neuro: No acute focal neurologic deficits. Cynthia Gaucher is a 72 y.o. who presents for follow up on abscess on right inguinal area. Plan     Skin abscess: s/p I&D on 12/01. Pt currently on day 3 of Keflex. Pt refers improvement of pain and denies purulent discharge.   -doxycycline 100 mg bid for 10 days to cover for mrsa. -f/u final abx sensitivity  -continue keflex to complete 10 days  -return to clinic if symptoms fail to improve. I have discussed the aforementioned diagnoses and plan with the patient in detail.  I have provided information in person and/or in AVS. All questions answered prior to discharge.       I discussed this patient with Dr. Og  (Attending Physician)   Signed By:  Colten Pena MD    Family Medicine Resident

## 2020-12-04 NOTE — PATIENT INSTRUCTIONS
Skin Abscess: Care Instructions  Your Care Instructions     A skin abscess is a bacterial infection that forms a pocket of pus. A boil is a kind of skin abscess. The doctor may have cut an opening in the abscess so that the pus can drain out. You may have gauze in the cut so that the abscess will stay open and keep draining. You may need antibiotics. You will need to follow up with your doctor to make sure the infection has gone away. The doctor has checked you carefully, but problems can develop later. If you notice any problems or new symptoms, get medical treatment right away. Follow-up care is a key part of your treatment and safety. Be sure to make and go to all appointments, and call your doctor if you are having problems. It's also a good idea to know your test results and keep a list of the medicines you take. How can you care for yourself at home? · Apply warm and dry compresses, a heating pad set on low, or a hot water bottle 3 or 4 times a day for pain. Keep a cloth between the heat source and your skin. · If your doctor prescribed antibiotics, take them as directed. Do not stop taking them just because you feel better. You need to take the full course of antibiotics. · Take pain medicines exactly as directed. ? If the doctor gave you a prescription medicine for pain, take it as prescribed. ? If you are not taking a prescription pain medicine, ask your doctor if you can take an over-the-counter medicine. · Keep your bandage clean and dry. Change the bandage whenever it gets wet or dirty, or at least one time a day. · If the abscess was packed with gauze:  ? Keep follow-up appointments to have the gauze changed or removed. If the doctor instructed you to remove the gauze, follow the instructions you were given for how to remove it. ? After the gauze is removed, soak the area in warm water for 15 to 20 minutes 2 times a day, until the wound closes. When should you call for help?    Call your doctor now or seek immediate medical care if:    · You have signs of worsening infection, such as:  ? Increased pain, swelling, warmth, or redness. ? Red streaks leading from the infected skin. ? Pus draining from the wound. ? A fever. Watch closely for changes in your health, and be sure to contact your doctor if:    · You do not get better as expected. Where can you learn more? Go to http://www.gray.com/  Enter Y808 in the search box to learn more about \"Skin Abscess: Care Instructions. \"  Current as of: July 2, 2020               Content Version: 12.6  © 5160-1544 [employer]. Care instructions adapted under license by [employer] (which disclaims liability or warranty for this information). If you have questions about a medical condition or this instruction, always ask your healthcare professional. [address] any warranty or liability for your use of this information. Doxycycline (By mouth)   Doxycycline (lxg-u-EQO-kleen)  Treats and prevents infections. Also used to prevent malaria and treat rosacea or severe acne. This medicine is a tetracycline antibiotic. Brand Name(s): Acticlate, Adoxa, Adoxa Manuelito 1/150, Avidoxy, Avidoxy DK, BenzoDox 30 Kit, BenzoDox 60 Kit, Doryx, Doryx MPC, Monodox, Morgidox 7S510MT, Morgidox [id number] Kit, Morgidox 1x50MG, Morgidox 1x50MG Kit, Morgidox [id number]   There may be other brand names for this medicine. When This Medicine Should Not Be Used: This medicine is not right for everyone. Do not use it if you had an allergic reaction to doxycycline or another tetracycline antibiotic, or if you are pregnant or breastfeeding. How to Use This Medicine:   Capsule, Delayed Release Capsule, Long Acting Capsule, Liquid, Tablet, Delayed Release Tablet  · Your doctor will tell you how much medicine to use. Do not use more than directed.   · Ask your pharmacist or doctor if you need to take this medicine with or without food. Some forms can be taken with food or milk, but others must be taken on an empty stomach. · Oracea® capsules: This medicine must be taken on an empty stomach, at least 1 hour before or 2 hours after a meal.  · Capsule: Swallow whole. Do not break, crush, chew, or open it. · Delayed-release tablets: You may also take this medicine by sprinkling the broken tablets onto room-temperature applesauce. Swallow this mixture right away. Do not chew it. Do not store the mixture for later use. You may take this medicine with food or milk to avoid stomach upset. · Oral liquid: Shake the bottle well just before each use. Measure the oral liquid medicine with a marked measuring spoon, oral syringe, or medicine cup. · Tablets: You may take this medicine with food or milk to avoid stomach irritation. To break a tablet, hold the tablet between your thumb and index fingers close to the appropriate scored line. Then, apply enough pressure to snap the tablet segments apart. Do not use the tablet if it does not break on the scored lines. · Take all of the medicine in your prescription to clear up your infection, even if you feel better after the first few doses. · Drink plenty of fluids to avoid throat problems, if you take the capsule or tablet form. · Malaria prevention: Start taking the medicine 1 or 2 days before you travel. Take the medicine every day during your trip. Keep taking it for 4 weeks after you return. However, do not use the medicine for longer than 4 months. · Do not use this medicine for more than 9 months if you are using it for rosacea. · Use only the brand of medicine your doctor prescribed. Other brands may not work the same way. · Read and follow the patient instructions that come with this medicine. Talk to your doctor or pharmacist if you have any questions. · Missed dose: Take a dose as soon as you remember.  If it is almost time for your next dose, wait until then and take a regular dose. Do not take extra medicine to make up for a missed dose. · Store the medicine in a closed container at room temperature, away from heat, moisture, and direct light. Do not freeze the oral liquid. Drugs and Foods to Avoid:   Ask your doctor or pharmacist before using any other medicine, including over-the-counter medicines, vitamins, and herbal products. · Some medicines can affect how doxycycline works. Tell your doctor if you are using any of the following:  ¨ Bismuth subsalicylate  ¨ Acne medicines (including isotretinoin)  ¨ Birth control pills  ¨ Blood thinner (including warfarin)  ¨ Medicine for seizures (including carbamazepine, phenobarbital, phenytoin)  ¨ Medicine that contains aluminum, calcium, or iron (including an antacid or vitamin supplement)  ¨ Medicine to treat psoriasis (including acitretin)  ¨ Penicillin antibiotic  ¨ Stomach medicine  Warnings While Using This Medicine:   · This medicine may cause birth defects if either partner is using it during conception or pregnancy. Tell your doctor right away if you or your partner becomes pregnant. Birth control pills may not work as well when used with this medicine. Use a second form of birth control to keep from getting pregnant. · Tell your doctor if you have kidney disease, liver disease, asthma, or an allergy to sulfites. Tell your doctor if you had surgery on your stomach, or if you have a history of yeast infections. · This medicine may cause the following problems:  ¨ Permanent change in tooth color (in children younger than 6years old)  ¨ Increased pressure inside the head  ¨ Yeast infection  ¨ Immune system problems  · This medicine can cause diarrhea. Call your doctor if the diarrhea becomes severe, does not stop, or is bloody. Do not take any medicine to stop diarrhea until you have talked to your doctor. Diarrhea can occur 2 months or more after you stop taking this medicine.   · This medicine may make your skin more sensitive to sunlight. Wear sunscreen. Do not use sunlamps or tanning beds. · Tell any doctor or dentist who treats you that you are using this medicine. This medicine may affect certain medical test results. · Call your doctor if your symptoms do not improve or if they get worse. · Your doctor will do lab tests at regular visits to check on the effects of this medicine. Keep all appointments. · Keep all medicine out of the reach of children. Never share your medicine with anyone. Possible Side Effects While Using This Medicine:   Call your doctor right away if you notice any of these side effects:  · Allergic reaction: Itching or hives, swelling in your face or hands, swelling or tingling in your mouth or throat, chest tightness, trouble breathing  · Blistering, peeling, red skin rash  · Burning, pain, or irritation in your upper stomach or throat  · Diarrhea that may contain blood  · Fever, chills, cough, runny or stuffy nose, sore throat, body aches  · Joint pain, fever, rash, and unusual tiredness or weakness  · Severe headache, dizziness, vision changes  · Sudden and severe stomach pain, nausea, vomiting, lightheadedness  If you notice these less serious side effects, talk with your doctor:   · Darkening of your skin, scars, teeth, or gums  · Sores or white patches on your lips, mouth, or throat  If you notice other side effects that you think are caused by this medicine, tell your doctor. Call your doctor for medical advice about side effects. You may report side effects to FDA at 6-752-FDA-1064  © 2017 [address] [employer] Information is for End User's use only and may not be sold, redistributed or otherwise used for commercial purposes. The above information is an  only. It is not intended as medical advice for individual conditions or treatments. Talk to your doctor, nurse or pharmacist before following any medical regimen to see if it is safe and effective for you.

## 2020-12-04 NOTE — TELEPHONE ENCOUNTER
Lab called with wound culture results:  HEAVY * METHICILLIN RESISTANT STAPHYLOCOCCUS AUREUS * * METHICILLIN RESISTANT STAPHYLOCOCCUS AUREUS *

## 2020-12-04 NOTE — PROGRESS NOTES
Chief Complaint   Patient presents with    Follow-up     Patient present for follow up on wound; wound is better; not leaking or oozing as much. Vitals:    12/04/20 0916   BP: 126/79   BP 1 Location: Left arm   BP Patient Position: Sitting   Pulse: (!) 104   Resp: 20   Temp: 97.8 °F (36.6 °C)   TempSrc: Temporal   SpO2: 95%   Weight: 201 lb 8 oz (91.4 kg)       1. Have you been to the ER, urgent care clinic since your last visit? Hospitalized since your last visit? No     2. Have you seen or consulted any other health care providers outside of the [address] [place] [address] since your last visit? Include any pap smears or colon screening.  No

## 2020-12-05 PROBLEM — L02.91 ABSCESS: Status: ACTIVE | Noted: 2020-12-05

## 2020-12-05 PROBLEM — L02.214 SOFT TISSUE ABSCESS OF INGUINAL REGION: Status: ACTIVE | Noted: 2020-12-05

## 2020-12-05 PROBLEM — B95.62 INFECTION OF SKIN DUE TO METHICILLIN RESISTANT STAPHYLOCOCCUS AUREUS (MRSA): Status: ACTIVE | Noted: 2020-12-05

## 2020-12-05 PROBLEM — L08.9 INFECTION OF SKIN DUE TO METHICILLIN RESISTANT STAPHYLOCOCCUS AUREUS (MRSA): Status: ACTIVE | Noted: 2020-12-05

## 2021-01-28 ENCOUNTER — TELEPHONE (OUTPATIENT)
Dept: FAMILY MEDICINE CLINIC | Age: 66
End: 2021-01-28

## 2021-01-28 NOTE — TELEPHONE ENCOUNTER
I called and got pt scheduled for vv appt. Appointment Information   Name: Rosalba Whitman MRN: [id number]    Date: 2/8/2021 Status: [facility]    Time: 9:30 AM Length: 30 [phone]   Visit Type: VV SPECIAL USE CASE [[id number]] Copay: $0.00    Provider: Josy Kinney MD Department: [facility]    Referral #:   Referral Status:      Referring Provider:   Patient Type:      Notes: TYLER [phone]  Follow up on boil , says she has one under her arm now and it is painful to her, asking about mammogram as well .       Closed enc

## 2021-01-28 NOTE — TELEPHONE ENCOUNTER
----- Message from Cristina Orozco sent at 1/25/2021 12:56 PM EST -----  Regarding: Dr. Veronica White telephone  Appointment not available    Caller's first and last name and relationship to patient (if not the patient): pt       Best contact number: [phone]      Preferred date and time: next available       Scheduled appointment date and time: n.a       Reason for appointment:  Follow up/ discuss getting mammogram test      Details to clarify the request: jorgito Orozco

## 2021-02-08 ENCOUNTER — TELEPHONE (OUTPATIENT)
Dept: FAMILY MEDICINE CLINIC | Age: 66
End: 2021-02-08

## 2021-02-08 ENCOUNTER — VIRTUAL VISIT (OUTPATIENT)
Dept: FAMILY MEDICINE CLINIC | Age: 66
End: 2021-02-08
Payer: MEDICARE

## 2021-02-08 DIAGNOSIS — I10 ESSENTIAL HYPERTENSION WITH GOAL BLOOD PRESSURE LESS THAN 130/80: ICD-10-CM

## 2021-02-08 DIAGNOSIS — L02.422 FURUNCLE OF LEFT AXILLA: Primary | ICD-10-CM

## 2021-02-08 DIAGNOSIS — E78.00 HYPERCHOLESTEROLEMIA: ICD-10-CM

## 2021-02-08 DIAGNOSIS — Z12.31 ENCOUNTER FOR SCREENING MAMMOGRAM FOR MALIGNANT NEOPLASM OF BREAST: ICD-10-CM

## 2021-02-08 PROCEDURE — 99214 OFFICE O/P EST MOD 30 MIN: CPT | Performed by: FAMILY MEDICINE

## 2021-02-08 RX ORDER — AMLODIPINE AND BENAZEPRIL HYDROCHLORIDE 10; 20 MG/1; MG/1
1 CAPSULE ORAL DAILY
Qty: 90 CAP | Refills: 3 | Status: SHIPPED | OUTPATIENT
Start: 2021-02-08 | End: 2022-04-21 | Stop reason: SDUPTHER

## 2021-02-08 RX ORDER — ROSUVASTATIN CALCIUM 10 MG/1
TABLET, COATED ORAL
Qty: 90 TAB | Refills: 3 | Status: SHIPPED | OUTPATIENT
Start: 2021-02-08

## 2021-02-08 RX ORDER — HYDROCHLOROTHIAZIDE 25 MG/1
TABLET ORAL
Qty: 90 TAB | Refills: 3 | Status: SHIPPED | OUTPATIENT
Start: 2021-02-08 | End: 2022-04-13 | Stop reason: SDUPTHER

## 2021-02-08 RX ORDER — DOXYCYCLINE 100 MG/1
100 CAPSULE ORAL 2 TIMES DAILY
Qty: 20 CAP | Refills: 0 | Status: SHIPPED | OUTPATIENT
Start: 2021-02-08 | End: 2021-02-18

## 2021-02-08 NOTE — TELEPHONE ENCOUNTER
----- Message from Tess Bartlett MD sent at 2/8/2021  9:47 AM EST -----  Good morning! Please schedule this patient with me on Wednesday (2/10/2021) at 8.00 am for IN-PERSON visit for procedure (I&D axilla abscess). I had virtual visit with her today. She is aware of her appointment so no need to call her. COVID screen is negative.   Thank you,  Tess Bartlett MD

## 2021-02-08 NOTE — PROGRESS NOTES
Camille Kwon  72 y.o. female  1955  [address] [facility]  [id number]   [address]:    Telemedicine Progress Note  Milton Vargas MD       Encounter Date and Time: February 8, 2021 at 9:31 AM    Consent: Camille Kwon, who was seen by synchronous (real-time) audio-video technology, and/or her healthcare decision maker, is aware that this patient-initiated, Telehealth encounter on 2/8/2021 is a billable service, with coverage as determined by her insurance carrier. She is aware that she may receive a bill and has provided verbal consent to proceed: Yes. Chief Complaint   Patient presents with    Skin Problem    Hypertension    Cholesterol Problem     History of Present Illness   Camille Kwon is a 72 y.o. female was evaluated by synchronous (real-time) audio-video technology from home, through a secure patient portal.    1. Boil   She reports small draining boil in the left axilla. Initially appeared 2.5 weeks ago. Was swollen and \"very red\" with yellowish drainage. She was just putting sterile gauze on that and every time she removes it she can see yellowish staining. The pain and erythema significantly improved from the beginning,itching resolved. No fever, no chills. She had similar boil but in the groin area in 12/2020, s/p I&D. 2. Need mammogram order     3. Asking for refills for antihypertensives, statin     Review of Systems   Review of Systems   Constitutional: Negative for chills and fever. Respiratory: Negative for cough, shortness of breath and wheezing. Cardiovascular: Negative for chest pain and leg swelling. Skin: Positive for itching and rash. Neurological: Negative for dizziness and headaches.        Vitals/Objective:     General: alert, cooperative, no distress   Mental  status: mental status: alert, oriented to person, place, and time, normal mood, behavior, speech, dress, motor activity, and thought processes   Resp: resp: normal effort and no respiratory distress   Neuro: neuro: no gross deficits   Skin: skin: no discoloration or lesions of concern on visible areas (but the area is poorly visualized due to poor video quality)    Due to this being a TeleHealth evaluation, many elements of the physical examination are unable to be assessed. Assessment and Plan:   71 yo female was evaluated for:    ICD-10-CM ICD-9-CM    1. Furuncle of left axilla  L02.422 680.3    2. Encounter for screening mammogram for malignant neoplasm of breast  Z12.31 V76.12 [facility] MAMMO BI SCREENING INCL CAD   3. Essential hypertension with goal blood pressure less than 130/80  I10 401.9 amLODIPine-benazepril (LOTREL) 10-20 mg per capsule      hydroCHLOROthiazide (HYDRODIURIL) 25 mg tablet      METABOLIC PANEL, COMPREHENSIVE      MICROALBUMIN, UR, RAND W/ MICROALB/CREAT RATIO   4. Hypercholesterolemia  E78.00 272.0 rosuvastatin (CRESTOR) 10 mg tablet      LIPID PANEL     1. Furuncle under the left axilla  Suspect there is a draining abscess. The pt is afebrile, improving per pt. Cannot fully visualize the area due to poor video quality. Will treat with Doxycycline x 10 days based on previous wound cx results of MRSA susceptible to Doxy, cannot do Bactrim due to drug interaction. Will evaluate in person in 2 days for possible I&D. 2. Hypertension. Stable. Well controlled. Refilled medications. Will check the labs hwne pt comes to clinic. 3. Hyperlipidemia   Stable. Will check lipid panel. Refilled statin. Due to Medicare Wellness- to be scheduled after February 24, 2021. Time spent in direct conversation with the patient to include medical condition(s) discussed, assessment and treatment plan:       We discussed the expected course, resolution and complications of the diagnosis(es) in detail. Medication risks, benefits, costs, interactions, and alternatives were discussed as indicated.   I advised her to contact the office if her condition worsens, changes or fails to improve as anticipated. She expressed understanding with the diagnosis(es) and plan. Patient understands that this encounter was a temporary measure, and the importance of further follow up and examination was emphasized. Patient verbalized understanding. Patient informed to follow up: In 2 days IN-person for possible I&D (2/10/2021 @8.00 am), then after 2/24/2021 for MWV. Electronically Signed: Tess Bartlett MD    CPT Codes 95053-10854 for Established Patients may apply to this Telehealth Visit. POS code: 18. Ruben Kelly is a 72 y.o. female who was evaluated by an audio-video encounter for concerns as above. Patient identification was verified prior to start of the visit. A caregiver was present when appropriate. Due to this being a TeleHealth encounter (During [facility] public health emergency), evaluation of the following organ systems was limited: Vitals/Constitutional/EENT/Resp/CV/GI//MS/Neuro/Skin/Heme-Lymph-Imm. Pursuant to the emergency declaration under the [facility] waiver authority and the [place] [employer] Act, this Virtual Visit was conducted, with patient's (and/or legal guardian's) consent, to reduce the patient's risk of exposure to COVID-19 and provide necessary medical care. Services were provided through a synchronous discussion virtually to substitute for in-person clinic visit. I was at home. The patient was at home. History   Patients past medical, surgical and family histories were reviewed and updated.       Past Medical History:   Diagnosis Date    Diabetes ([facility] [address] 1995    Genital herpes simplex type 2     GERD (gastroesophageal reflux disease)     no medication prescribed    Hypercholesterolemia     Hypertension     Narcolepsy     Neuropathy in diabetes ([facility] [address]     Obesity (BMI 30.0-34. 9)      Past Surgical History:   Procedure Laterality Date    COLONOSCOPY N/A 2019    COLONOSCOPY performed by Fatimah Francis MD at [address] HX  SECTION  03/15/1979    HX  SECTION  1990    HX KNEE REPLACEMENT Bilateral 2005    HX MOHS PROCEDURES Right     HX OTHER SURGICAL Right 2019    Excision of lipoma of the right buttock.  HX ROTATOR CUFF REPAIR Right     MS EXTRAC ERUPTED TOOTH/EXPOSED ROOT Right 14    3 UPPER RIGHT SIDE BACK TEETH     Family History   Problem Relation Age of Onset    Diabetes Mother     Heart Disease Mother     Hypertension Mother    [facility] Arthritis-rheumatoid Mother     Diabetes Father     Heart Disease Father     Cancer Maternal Aunt         bone    Diabetes Sister      Social History     Tobacco Use    Smoking status: Never Smoker    Smokeless tobacco: Never Used   Substance Use Topics    Alcohol use: Yes     Alcohol/week: 0.0 - 1.0 standard drinks     Frequency: Never    Drug use: No     Patient Active Problem List   Diagnosis Code    Hypertension I10    Diabetes ([facility] [address] E11.9    Hypercholesterolemia E78.00    Other chest pain R07.89    Neuropathy due to secondary diabetes ([facility] [address] E13.40    Lumbar stenosis M48.061    Lumbar herniated disc M51.26    Postcoital UTI N39.0    Type 2 diabetes mellitus with diabetic nephropathy, without long-term current use of insulin (HCC) E11.21    Type 2 diabetes mellitus with hyperglycemia, without long-term current use of insulin (HCC) E11.65    Closed nondisplaced fracture of first cervical vertebra (HCC) S12.001A    Closed fracture of multiple ribs of right side with routine healing S22.41XD    Type 2 diabetes mellitus with diabetic neuropathy ([facility]) E11.40    HSV-2 infection B00.9    Breast cancer screening Z12.39    Cervical cancer screening Z12.4    Obesity (BMI 30.0-34. 9) E66.9    Shingles rash B02.9    S/P excision of lipoma Z98.890, Z86.018    Severe obesity ([facility] E66.01    Abscess L02.91    Soft tissue abscess of inguinal region L02.214    Infection of skin due to methicillin resistant Staphylococcus aureus (MRSA) A49.02          Current Medications/Allergies   Medications and Allergies reviewed:    Current Outpatient Medications   Medication Sig Dispense Refill    doxycycline (VIBRAMYCIN) 100 mg capsule Take 1 Cap by mouth two (2) times a day for 10 days. 20 Cap 0    amLODIPine-benazepril (LOTREL) 10-20 mg per capsule Take 1 Cap by mouth daily. 90 Cap 3    hydroCHLOROthiazide (HYDRODIURIL) 25 mg tablet TAKE 1 TABLET BY MOUTH DAILY 90 Tab 3    rosuvastatin (CRESTOR) 10 mg tablet TAKE 1 TABLET BY MOUTH DAILY 90 Tab 3    traMADoL (ULTRAM) 50 mg tablet Take 50 mg by mouth two (2) times a day.  pregabalin (LYRICA) 100 mg capsule Take 100 mg by mouth two (2) times a day.  glipiZIDE (GLUCOTROL) 10 mg tablet TAKE 1 TABLET BY MOUTH TWICE DAILY 180 Tab 3    metFORMIN (GLUCOPHAGE) 1,000 mg tablet Take 1 Tab by mouth two (2) times daily (with meals). 369 Tab 3    Trulicity 1.5 QT/8.7 mL sub-q pen INJECT 1 SYRINGE UNDER THE SKIN EVERY 7 DAYS 6 mL 4    DULoxetine (CYMBALTA) 30 mg capsule TK ONE C PO  Q 12 H      ammonium lactate (LAC-HYDRIN) 12 % lotion JEMAL EXT TO ALL SURFACES OF BOTH FEET BID      cyanocobalamin (VITAMIN B12) 100 mcg tablet Take 1 Tab by mouth daily. 90 Tab 3    naloxone (NARCAN) 2 mg/actuation spry Use 1 spray intranasally, then discard. Repeat with new spray every 2 min as needed for opioid overdose symptoms, alternating nostrils.  1 Vial 1    Blood-Glucose Meter (ACCU-CHEK GEORGINA PLUS METER) misc Test once daily Dx Code: E11.65 1 Each 0    glucose blood VI test strips (ACCU-CHEK GEORGINA PLUS TEST STRP) strip Test once daily Dx Code: E11.65 100 Strip 3    lancets (ACCU-CHEK FASTCLIX LANCET DRUM) misc Test once daily Dx Code: E11.65 100 Each 3    peg 400-propylene glycol (SYSTANE, PROPYLENE GLYCOL,) 0.4-0.3 % drop Administer 1 Drop to both eyes as needed.  nystatin (MYCOSTATIN) topical cream Apply  to affected area two (2) times a day. 60 g 1    omega-3 fatty acids-vitamin e (FISH OIL) 1,000 mg Cap Take 1 Cap by mouth daily.        Allergies   Allergen Reactions    Aspirin Hives     Tolerates ibuprofen and naproxen    Clindamycin Rash and Hives

## 2021-02-10 ENCOUNTER — OFFICE VISIT (OUTPATIENT)
Dept: FAMILY MEDICINE CLINIC | Age: 66
End: 2021-02-10
Payer: MEDICARE

## 2021-02-10 VITALS
OXYGEN SATURATION: 96 % | HEIGHT: 63 IN | HEART RATE: 79 BPM | BODY MASS INDEX: 35.58 KG/M2 | DIASTOLIC BLOOD PRESSURE: 69 MMHG | RESPIRATION RATE: 16 BRPM | TEMPERATURE: 97.1 F | WEIGHT: 200.8 LBS | SYSTOLIC BLOOD PRESSURE: 102 MMHG

## 2021-02-10 DIAGNOSIS — E11.42 DIABETIC POLYNEUROPATHY ASSOCIATED WITH TYPE 2 DIABETES MELLITUS (HCC): ICD-10-CM

## 2021-02-10 DIAGNOSIS — E78.00 HYPERCHOLESTEROLEMIA: ICD-10-CM

## 2021-02-10 DIAGNOSIS — M54.2 NECK PAIN: ICD-10-CM

## 2021-02-10 DIAGNOSIS — I10 ESSENTIAL HYPERTENSION WITH GOAL BLOOD PRESSURE LESS THAN 130/80: ICD-10-CM

## 2021-02-10 DIAGNOSIS — L02.91 ABSCESS: ICD-10-CM

## 2021-02-10 DIAGNOSIS — L02.92 BOIL: Primary | ICD-10-CM

## 2021-02-10 PROCEDURE — 99214 OFFICE O/P EST MOD 30 MIN: CPT | Performed by: FAMILY MEDICINE

## 2021-02-10 NOTE — PROGRESS NOTES
Karlos Kaufman is a 72 y.o. female    Chief Complaint   Patient presents with    Cyst     Patient states she had a virtual with  and she told her to come in to follow up on a boil  under her left arm. She states she has been having this since decmeber. No other concerns. 1. Have you been to the ER, urgent care clinic since your last visit? Hospitalized since your last visit? No  M  2. Have you seen or consulted any other health care providers outside of the [address] [place] [address] since your last visit? Include any pap smears or colon screening. No      Visit Vitals  /69 (BP 1 Location: Right upper arm, BP Patient Position: Sitting)   Pulse 79   Temp 97.1 °F (36.2 °C) (Temporal)   Resp 16   Ht 5' 3\" (1.6 m)   Wt 200 lb 12.8 oz (91.1 kg)   SpO2 96%   BMI 35.57 kg/m²           Health Maintenance Due   Topic Date Due    COVID-19 Vaccine (1 of 2) 10/31/1971    Shingrix Vaccine Age 50> (1 of 2) 10/31/2005    MICROALBUMIN Q1  05/25/2019    Lipid Screen  06/13/2020    Flu Vaccine (1) 09/01/2020    Bone Densitometry (Dexa) Screening  10/31/2020    Foot Exam Q1  11/15/2020    Medicare Yearly Exam  02/24/2021         Medication Reconciliation completed, changes noted.   Please  Update medication list.

## 2021-02-10 NOTE — PATIENT INSTRUCTIONS
Dr. Girma Bowers (Orthopedic surgeon)  [address]  [place], [address] [facility]       Skin Abscess: Care Instructions  Your Care Instructions     A skin abscess is a bacterial infection that forms a pocket of pus. A boil is a kind of skin abscess. The doctor may have cut an opening in the abscess so that the pus can drain out. You may have gauze in the cut so that the abscess will stay open and keep draining. You may need antibiotics. You will need to follow up with your doctor to make sure the infection has gone away. The doctor has checked you carefully, but problems can develop later. If you notice any problems or new symptoms, get medical treatment right away. Follow-up care is a key part of your treatment and safety. Be sure to make and go to all appointments, and call your doctor if you are having problems. It's also a good idea to know your test results and keep a list of the medicines you take. How can you care for yourself at home? · Apply warm and dry compresses, a heating pad set on low, or a hot water bottle 3 or 4 times a day for pain. Keep a cloth between the heat source and your skin. · If your doctor prescribed antibiotics, take them as directed. Do not stop taking them just because you feel better. You need to take the full course of antibiotics. · Take pain medicines exactly as directed. ? If the doctor gave you a prescription medicine for pain, take it as prescribed. ? If you are not taking a prescription pain medicine, ask your doctor if you can take an over-the-counter medicine. · Keep your bandage clean and dry. Change the bandage whenever it gets wet or dirty, or at least one time a day. · If the abscess was packed with gauze:  ? Keep follow-up appointments to have the gauze changed or removed. If the doctor instructed you to remove the gauze, follow the instructions you were given for how to remove it. ?  After the gauze is removed, soak the area in warm water for 15 to 20 minutes 2 times a day, until the wound closes. When should you call for help? Call your doctor now or seek immediate medical care if:    · You have signs of worsening infection, such as:  ? Increased pain, swelling, warmth, or redness. ? Red streaks leading from the infected skin. ? Pus draining from the wound. ? A fever. Watch closely for changes in your health, and be sure to contact your doctor if:    · You do not get better as expected. Where can you learn more? Go to http://www.gray.com/  Enter W748 in the search box to learn more about \"Skin Abscess: Care Instructions. \"  Current as of: July 2, 2020               Content Version: 12.6  © 2006-2020 [employer]. Care instructions adapted under license by [employer] (which disclaims liability or warranty for this information). If you have questions about a medical condition or this instruction, always ask your healthcare professional. [place] [address] any warranty or liability for your use of this information.
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
## 2021-02-11 LAB
ALBUMIN SERPL-MCNC: 3.8 G/DL (ref 3.5–5)
ALBUMIN/GLOB SERPL: 1.2 {RATIO} (ref 1.1–2.2)
ALP SERPL-CCNC: 103 U/L (ref 45–117)
ALT SERPL-CCNC: 28 U/L (ref 12–78)
ANION GAP SERPL CALC-SCNC: 10 MMOL/L (ref 5–15)
AST SERPL-CCNC: 18 U/L (ref 15–37)
BILIRUB SERPL-MCNC: 0.2 MG/DL (ref 0.2–1)
BUN SERPL-MCNC: 22 MG/DL (ref 6–20)
BUN/CREAT SERPL: 15 (ref 12–20)
CALCIUM SERPL-MCNC: 9.4 MG/DL (ref 8.5–10.1)
CHLORIDE SERPL-SCNC: 108 MMOL/L (ref 97–108)
CHOLEST SERPL-MCNC: 126 MG/DL
CO2 SERPL-SCNC: 26 MMOL/L (ref 21–32)
COMMENT, HOLDF: NORMAL
CREAT SERPL-MCNC: 1.43 MG/DL (ref 0.55–1.02)
GLOBULIN SER CALC-MCNC: 3.3 G/DL (ref 2–4)
GLUCOSE SERPL-MCNC: 84 MG/DL (ref 65–100)
HDLC SERPL-MCNC: 50 MG/DL
HDLC SERPL: 2.5 {RATIO} (ref 0–5)
LDLC SERPL CALC-MCNC: 53.2 MG/DL (ref 0–100)
LIPID PROFILE,FLP: NORMAL
POTASSIUM SERPL-SCNC: 3.9 MMOL/L (ref 3.5–5.1)
PROT SERPL-MCNC: 7.1 G/DL (ref 6.4–8.2)
SAMPLES BEING HELD,HOLD: NORMAL
SODIUM SERPL-SCNC: 144 MMOL/L (ref 136–145)
TRIGL SERPL-MCNC: 114 MG/DL (ref ?–150)
VLDLC SERPL CALC-MCNC: 22.8 MG/DL

## 2021-02-15 ENCOUNTER — TRANSCRIBE ORDER (OUTPATIENT)
Dept: SCHEDULING | Age: 66
End: 2021-02-15

## 2021-02-15 DIAGNOSIS — Z79.891 LONG-TERM CURRENT USE OF OPIATE ANALGESIC: ICD-10-CM

## 2021-02-15 DIAGNOSIS — M54.2 CERVICALGIA: Primary | ICD-10-CM

## 2021-02-15 DIAGNOSIS — M25.512 SHOULDER PAIN, LEFT: ICD-10-CM

## 2021-02-16 NOTE — PROGRESS NOTES
CMP with elevated Creatinine and decreasing GFR. Will discuss nephrologist referral.  Lipid panel with , , HDL 50, LDL 53. The pt is already on HI statin.   Will discuss the results at upcoming Medicare [facility]

## 2021-02-22 ENCOUNTER — HOSPITAL ENCOUNTER (OUTPATIENT)
Dept: MAMMOGRAPHY | Age: 66
Discharge: HOME | End: 2021-02-22
Attending: FAMILY MEDICINE
Payer: MEDICARE

## 2021-02-22 DIAGNOSIS — Z12.31 ENCOUNTER FOR SCREENING MAMMOGRAM FOR MALIGNANT NEOPLASM OF BREAST: ICD-10-CM

## 2021-02-22 PROCEDURE — 77063 BREAST TOMOSYNTHESIS BI: CPT

## 2021-02-22 NOTE — PROGRESS NOTES
Mammogram with BI-RADS 1: Negative. No mammographic evidence of malignancy. Repeat mammogram in 1 year.

## 2021-02-24 ENCOUNTER — DOCUMENTATION ONLY (OUTPATIENT)
Dept: FAMILY MEDICINE CLINIC | Age: 66
End: 2021-02-24

## 2021-02-24 NOTE — PROGRESS NOTES
Attempted to call the patient by phone numbers provided in the chart x 2 ,no answer.     4:42 PM  2/24/2021  Tracey Ramirez MD

## 2021-02-26 ENCOUNTER — HOSPITAL ENCOUNTER (OUTPATIENT)
Dept: MRI IMAGING | Age: 66
End: 2021-02-26
Attending: PHYSICAL MEDICINE & REHABILITATION
Payer: MEDICARE

## 2021-02-26 ENCOUNTER — HOSPITAL ENCOUNTER (OUTPATIENT)
Dept: MRI IMAGING | Age: 66
Discharge: HOME | End: 2021-02-26
Attending: PHYSICAL MEDICINE & REHABILITATION
Payer: MEDICARE

## 2021-02-26 DIAGNOSIS — Z79.891 LONG-TERM CURRENT USE OF OPIATE ANALGESIC: ICD-10-CM

## 2021-02-26 DIAGNOSIS — M54.2 CERVICALGIA: ICD-10-CM

## 2021-02-26 PROCEDURE — 72141 MRI NECK SPINE W/O DYE: CPT

## 2021-04-02 ENCOUNTER — VIRTUAL VISIT (OUTPATIENT)
Dept: ENDOCRINOLOGY | Age: 66
End: 2021-04-02
Payer: MEDICARE

## 2021-04-02 DIAGNOSIS — E11.65 TYPE 2 DIABETES MELLITUS WITH HYPERGLYCEMIA, UNSPECIFIED WHETHER LONG TERM INSULIN USE (HCC): ICD-10-CM

## 2021-04-02 DIAGNOSIS — E78.2 MIXED HYPERLIPIDEMIA: ICD-10-CM

## 2021-04-02 DIAGNOSIS — E11.21 TYPE 2 DIABETES MELLITUS WITH DIABETIC NEPHROPATHY, WITHOUT LONG-TERM CURRENT USE OF INSULIN (HCC): Primary | ICD-10-CM

## 2021-04-02 DIAGNOSIS — E11.65 TYPE 2 DIABETES MELLITUS WITH HYPERGLYCEMIA, WITHOUT LONG-TERM CURRENT USE OF INSULIN (HCC): ICD-10-CM

## 2021-04-02 PROCEDURE — G8432 DEP SCR NOT DOC, RNG: HCPCS | Performed by: INTERNAL MEDICINE

## 2021-04-02 PROCEDURE — 2022F DILAT RTA XM EVC RTNOPTHY: CPT | Performed by: INTERNAL MEDICINE

## 2021-04-02 PROCEDURE — G8756 NO BP MEASURE DOC: HCPCS | Performed by: INTERNAL MEDICINE

## 2021-04-02 PROCEDURE — 1101F PT FALLS ASSESS-DOCD LE1/YR: CPT | Performed by: INTERNAL MEDICINE

## 2021-04-02 PROCEDURE — G8427 DOCREV CUR MEDS BY ELIG CLIN: HCPCS | Performed by: INTERNAL MEDICINE

## 2021-04-02 PROCEDURE — 3046F HEMOGLOBIN A1C LEVEL >9.0%: CPT | Performed by: INTERNAL MEDICINE

## 2021-04-02 PROCEDURE — 1090F PRES/ABSN URINE INCON ASSESS: CPT | Performed by: INTERNAL MEDICINE

## 2021-04-02 PROCEDURE — G8400 PT W/DXA NO RESULTS DOC: HCPCS | Performed by: INTERNAL MEDICINE

## 2021-04-02 PROCEDURE — 99214 OFFICE O/P EST MOD 30 MIN: CPT | Performed by: INTERNAL MEDICINE

## 2021-04-02 PROCEDURE — 3017F COLORECTAL CA SCREEN DOC REV: CPT | Performed by: INTERNAL MEDICINE

## 2021-04-02 PROCEDURE — G9899 SCRN MAM PERF RSLTS DOC: HCPCS | Performed by: INTERNAL MEDICINE

## 2021-04-02 RX ORDER — METFORMIN HYDROCHLORIDE 1000 MG/1
1000 TABLET ORAL 2 TIMES DAILY WITH MEALS
Qty: 180 TAB | Refills: 3 | Status: SHIPPED | OUTPATIENT
Start: 2021-04-02 | End: 2021-09-09 | Stop reason: SDUPTHER

## 2021-04-02 RX ORDER — DULAGLUTIDE 1.5 MG/.5ML
INJECTION, SOLUTION SUBCUTANEOUS
Qty: 6 ML | Refills: 4 | Status: SHIPPED | OUTPATIENT
Start: 2021-04-02 | End: 2021-06-25

## 2021-04-02 RX ORDER — GLIPIZIDE 10 MG/1
TABLET ORAL
Qty: 180 TAB | Refills: 3 | Status: SHIPPED | OUTPATIENT
Start: 2021-04-02 | End: 2021-09-09 | Stop reason: SDUPTHER
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
## 2021-04-02 NOTE — PROGRESS NOTES
Paris Sierra is a 72 y.o. female here for   Chief Complaint   Patient presents with    Diabetes       1. Have you been to the ER, urgent care clinic since your last visit? Hospitalized since your last visit? -no    2. Have you seen or consulted any other health care providers outside of the [address] [place] [address] since your last visit?   Include any pap smears or colon screening.-Dr. Hafsa Astudillo

## 2021-04-26 ENCOUNTER — OFFICE VISIT (OUTPATIENT)
Dept: FAMILY MEDICINE CLINIC | Age: 66
End: 2021-04-26
Payer: MEDICARE

## 2021-04-26 VITALS
BODY MASS INDEX: 35.3 KG/M2 | HEART RATE: 71 BPM | OXYGEN SATURATION: 97 % | WEIGHT: 199.2 LBS | RESPIRATION RATE: 16 BRPM | TEMPERATURE: 97.3 F | SYSTOLIC BLOOD PRESSURE: 109 MMHG | HEIGHT: 63 IN | DIASTOLIC BLOOD PRESSURE: 71 MMHG

## 2021-04-26 DIAGNOSIS — L02.421 FURUNCLE OF RIGHT AXILLA: ICD-10-CM

## 2021-04-26 DIAGNOSIS — L30.4 INTERTRIGO: Primary | ICD-10-CM

## 2021-04-26 PROCEDURE — G8427 DOCREV CUR MEDS BY ELIG CLIN: HCPCS | Performed by: FAMILY MEDICINE

## 2021-04-26 PROCEDURE — G8754 DIAS BP LESS 90: HCPCS | Performed by: FAMILY MEDICINE

## 2021-04-26 PROCEDURE — 1090F PRES/ABSN URINE INCON ASSESS: CPT | Performed by: FAMILY MEDICINE

## 2021-04-26 PROCEDURE — G8400 PT W/DXA NO RESULTS DOC: HCPCS | Performed by: FAMILY MEDICINE

## 2021-04-26 PROCEDURE — G8536 NO DOC ELDER MAL SCRN: HCPCS | Performed by: FAMILY MEDICINE

## 2021-04-26 PROCEDURE — G8417 CALC BMI ABV UP PARAM F/U: HCPCS | Performed by: FAMILY MEDICINE

## 2021-04-26 PROCEDURE — 99213 OFFICE O/P EST LOW 20 MIN: CPT | Performed by: FAMILY MEDICINE

## 2021-04-26 PROCEDURE — G8432 DEP SCR NOT DOC, RNG: HCPCS | Performed by: FAMILY MEDICINE

## 2021-04-26 PROCEDURE — G9899 SCRN MAM PERF RSLTS DOC: HCPCS | Performed by: FAMILY MEDICINE

## 2021-04-26 PROCEDURE — G8752 SYS BP LESS 140: HCPCS | Performed by: FAMILY MEDICINE

## 2021-04-26 PROCEDURE — 1101F PT FALLS ASSESS-DOCD LE1/YR: CPT | Performed by: FAMILY MEDICINE

## 2021-04-26 PROCEDURE — 3017F COLORECTAL CA SCREEN DOC REV: CPT | Performed by: FAMILY MEDICINE

## 2021-04-26 RX ORDER — NYSTATIN 100000 U/G
CREAM TOPICAL 2 TIMES DAILY
Qty: 60 G | Refills: 1 | Status: SHIPPED | OUTPATIENT
Start: 2021-04-26

## 2021-04-26 NOTE — PROGRESS NOTES
Subjective:   Arturo Roper is an 72 y.o. female who presents for evaluation of the bumps in the right axilla. HPI  Chief Complaint   Patient presents with    Mass     Patient is coming in for several lumps under her right arm. [facility]. She states that the size and the pain that they wer causing came down with the antibiotic. No other concerns. She developed painful bumps under the right arm a little over 2 weeks ago. She went to be evaluated at [facility] and was diagnosed with folliculitis, was prescribed antibiotics ( she does not remember the name). She completed the course of antibiotics and the bumps completely went away. Denies bumps on other areas, no fevers. She had similar bum in the groin which progressed to a small abscess which was I&D in our clinic in the past .    Allergies - reviewed: Allergies   Allergen Reactions    Aspirin Hives     Tolerates ibuprofen and naproxen    Clindamycin Rash and Hives         Medications - reviewed:  Current Outpatient Medications   Medication Sig    nystatin (MYCOSTATIN) topical cream Apply  to affected area two (2) times a day.  dulaglutide (Trulicity) 1.5 LM/8.2 mL sub-q pen INJECT 1 SYRINGE UNDER THE SKIN EVERY 7 DAYS    metFORMIN (GLUCOPHAGE) 1,000 mg tablet Take 1 Tab by mouth two (2) times daily (with meals).  glipiZIDE (GLUCOTROL) 10 mg tablet TAKE 1 TABLET BY MOUTH TWICE DAILY    amLODIPine-benazepril (LOTREL) 10-20 mg per capsule Take 1 Cap by mouth daily.  hydroCHLOROthiazide (HYDRODIURIL) 25 mg tablet TAKE 1 TABLET BY MOUTH DAILY    rosuvastatin (CRESTOR) 10 mg tablet TAKE 1 TABLET BY MOUTH DAILY    traMADoL (ULTRAM) 50 mg tablet Take 50 mg by mouth two (2) times a day.  pregabalin (LYRICA) 100 mg capsule Take 100 mg by mouth three (3) times daily.     ammonium lactate (LAC-HYDRIN) 12 % lotion JEMAL EXT TO ALL SURFACES OF BOTH FEET BID    cyanocobalamin (VITAMIN B12) 100 mcg tablet Take 1 Tab by mouth daily.  Blood-Glucose Meter (ACCU-CHEK GEORGINA PLUS METER) misc Test once daily Dx Code: E11.65    glucose blood VI test strips (ACCU-CHEK GEORGINA PLUS TEST STRP) strip Test once daily Dx Code: E11.65    lancets (ACCU-CHEK FASTCLIX LANCET DRUM) misc Test once daily Dx Code: E11.65    peg 400-propylene glycol (SYSTANE, PROPYLENE GLYCOL,) 0.4-0.3 % drop Administer 1 Drop to both eyes as needed.  omega-3 fatty acids-vitamin e (FISH OIL) 1,000 mg Cap Take 1 Cap by mouth daily.  DULoxetine (CYMBALTA) 30 mg capsule TK ONE C PO  Q 12 H    naloxone (NARCAN) 2 mg/actuation spry Use 1 spray intranasally, then discard. Repeat with new spray every 2 min as needed for opioid overdose symptoms, alternating nostrils. No current facility-administered medications for this visit. Social History - reviewed:  Social History     Socioeconomic History    Marital status:      Spouse name: Not on file    Number of children: Not on file    Years of education: Not on file    Highest education level: Not on file   Occupational History    Not on file   Social Needs    Financial resource strain: Not on file    Food insecurity     Worry: Not on file     Inability: Not on file    Transportation needs     Medical: Not on file     Non-medical: Not on file   Tobacco Use    Smoking status: Never Smoker    Smokeless tobacco: Never Used   Substance and Sexual Activity    Alcohol use:  Yes     Alcohol/week: 0.0 - 1.0 standard drinks     Frequency: Never    Drug use: No    Sexual activity: Yes     Partners: Male     Birth control/protection: None   Lifestyle    Physical activity     Days per week: Not on file     Minutes per session: Not on file    Stress: Not on file   Relationships    Social connections     Talks on phone: Not on file     Gets together: Not on file     Attends Mandaen service: Not on file     Active member of club or organization: Not on file     Attends meetings of clubs or organizations: Not on file     Relationship status: Not on file    Intimate partner violence     Fear of current or ex partner: Not on file     Emotionally abused: Not on file     Physically abused: Not on file     Forced sexual activity: Not on file   Other Topics Concern    Not on file   Social History Narrative    Not on file         Review of Systems   CONSTITUTIONAL: denies fever. Denies chills. Innis Eaton SKIN: denies rash. Denies easy bruising      Objective:     Visit Vitals  /71 (BP 1 Location: Right upper arm, BP Patient Position: Sitting)   Pulse 71   Temp 97.3 °F (36.3 °C) (Temporal)   Resp 16   Ht 5' 3\" (1.6 m)   Wt 199 lb 3.2 oz (90.4 kg)   LMP 01/01/2008 (LMP Unknown)   SpO2 97%   BMI 35.29 kg/m²       General appearance - alert, well appearing, and in no distress  Neck - supple, no significant adenopathy  Chest - clear to auscultation, no wheezes, rales or rhonchi, symmetric air entry  Heart - normal rate, regular rhythm, normal S1, S2, no murmurs, rubs, clicks or gallops  Skin - There is well healed small scar and no erythema, warmth or swelling in the left axilla. No palpable masses or bumps. There are moist areas on the anterior abdomen under the skin fold with underlying minimal erythema and whitish spots c/w candidal infection. Assessment:   Lakeisha Saleem is a 72 y.o. female who was seen today for:    ICD-10-CM ICD-9-CM    1. Intertrigo  L30.4 695.89 nystatin (MYCOSTATIN) topical cream   2. Furuncle of right axilla  L02.421 680.3          Plan:   1. Intertrigo. Mild. Will treat with topical Nystatin cream. Keep the area dry. 2. Furuncle of the right axilla. Resolved, s/p antibiotic treatment. No concerns for infection at this point. Discussed with the pt in length that the furuncles may re-occur and may require antibiotics in the future and possibly I&D. She expressed understanding,will call if the symptoms reoccur.      Follow-up and Dispositions    · Return in about 2 weeks (around 5/10/2021) for Medicare Wellness . I have discussed the diagnosis with the patient and the intended plan as seen in the above orders. The patient has received an after-visit summary and questions were answered concerning future plans. I have discussed medication side effects and warnings with the patient as well. Informed pt to return to the office if new symptoms arise.       Colin Lua MD  Family Medicine Physician

## 2021-04-26 NOTE — PROGRESS NOTES
Wendy Escobar is a 72 y.o. female    Chief Complaint   Patient presents with    Mass     Patient is coming in for several lumps under her right arm. [facility] 2 weeks ago. She states that the size and the pain that they wer causing came down with the antibiotic. No other concerns. 1. Have you been to the ER, urgent care clinic since your last visit? Hospitalized since your last visit? No  M  2. Have you seen or consulted any other health care providers outside of the [address] [place] [address] since your last visit? Include any pap smears or colon screening. No      Visit Vitals  /71 (BP 1 Location: Right upper arm, BP Patient Position: Sitting)   Pulse 71   Temp 97.3 °F (36.3 °C) (Temporal)   Resp 16   Ht 5' 3\" (1.6 m)   Wt 199 lb 3.2 oz (90.4 kg)   SpO2 97%   BMI 35.29 kg/m²           Health Maintenance Due   Topic Date Due    COVID-19 Vaccine (1) Never done    Shingrix Vaccine Age 50> (1 of 2) Never done    MICROALBUMIN Q1  05/25/2019    Bone Densitometry (Dexa) Screening  Never done    Medicare Yearly Exam  02/24/2021         Medication Reconciliation completed, changes noted.   Please  Update medication list.

## 2021-04-26 NOTE — PATIENT INSTRUCTIONS
Skin Abscess: Care Instructions  Your Care Instructions     A skin abscess is a bacterial infection that forms a pocket of pus. A boil is a kind of skin abscess. The doctor may have cut an opening in the abscess so that the pus can drain out. You may have gauze in the cut so that the abscess will stay open and keep draining. You may need antibiotics. You will need to follow up with your doctor to make sure the infection has gone away. The doctor has checked you carefully, but problems can develop later. If you notice any problems or new symptoms, get medical treatment right away. Follow-up care is a key part of your treatment and safety. Be sure to make and go to all appointments, and call your doctor if you are having problems. It's also a good idea to know your test results and keep a list of the medicines you take. How can you care for yourself at home? · Apply warm and dry compresses, a heating pad set on low, or a hot water bottle 3 or 4 times a day for pain. Keep a cloth between the heat source and your skin. · If your doctor prescribed antibiotics, take them as directed. Do not stop taking them just because you feel better. You need to take the full course of antibiotics. · Take pain medicines exactly as directed. ? If the doctor gave you a prescription medicine for pain, take it as prescribed. ? If you are not taking a prescription pain medicine, ask your doctor if you can take an over-the-counter medicine. · Keep your bandage clean and dry. Change the bandage whenever it gets wet or dirty, or at least one time a day. · If the abscess was packed with gauze:  ? Keep follow-up appointments to have the gauze changed or removed. If the doctor instructed you to remove the gauze, follow the instructions you were given for how to remove it. ? After the gauze is removed, soak the area in warm water for 15 to 20 minutes 2 times a day, until the wound closes. When should you call for help?    Call your doctor now or seek immediate medical care if:    · You have signs of worsening infection, such as:  ? Increased pain, swelling, warmth, or redness. ? Red streaks leading from the infected skin. ? Pus draining from the wound. ? A fever. Watch closely for changes in your health, and be sure to contact your doctor if:    · You do not get better as expected. Where can you learn more? Go to http://www.gray.com/  Enter H478 in the search box to learn more about \"Skin Abscess: Care Instructions. \"  Current as of: July 2, 2020               Content Version: 12.8  © 2006-2021 [employer]. Care instructions adapted under license by [employer] (which disclaims liability or warranty for this information). If you have questions about a medical condition or this instruction, always ask your healthcare professional. [address] any warranty or liability for your use of this information. Yeast Skin Infection: Care Instructions  Your Care Instructions     Yeast normally lives on your skin. Sometimes too much yeast can overgrow in certain areas of the skin and cause an infection. The infection causes red, scaly, moist patches on your skin that may itch. Common areas for skin yeast infections are skin folds under the breasts or belly area. The warm and moist areas in the skin folds can make it easier for yeast to overgrow. Yeast infections also can be found on other parts of the body such as the groin or armpits. You will probably get a cream or ointment that contains an antifungal medicine. Examples of these are miconazole and clotrimazole. You put it on your skin to treat the infection. Your doctor may give you a prescription for the cream or ointment. Or you may be able to buy it without a prescription at most drugstores. If the infection is severe, the doctor will prescribe antifungal pills.   A yeast infection usually goes away after about a week of treatment. But it's important to use the medicine for as long as your doctor tells you to. Follow-up care is a key part of your treatment and safety. Be sure to make and go to all appointments, and call your doctor if you are having problems. It's also a good idea to know your test results and keep a list of the medicines you take. How can you care for yourself at home? · Be safe with medicines. Take your medicines exactly as prescribed. Call your doctor if you think you are having a problem with your medicine. · Keep your skin clean and dry. Your doctor may suggest using powder that contains an antifungal medicine in the skin folds. · Wear loose clothing. When should you call for help? Call your doctor now or seek immediate medical care if:    · You have symptoms of infection, such as:  ? Increased pain, swelling, warmth, or redness. ? Red streaks leading from the area. ? Pus draining from the area. ? A fever. Watch closely for changes in your health, and be sure to contact your doctor if:    · You do not get better as expected. Where can you learn more? Go to http://www.gray.com/  Enter A142 in the search box to learn more about \"Yeast Skin Infection: Care Instructions. \"  Current as of: July 2, 2020               Content Version: 12.8  © 2006-2021 [employer]. Care instructions adapted under license by [employer] (which disclaims liability or warranty for this information). If you have questions about a medical condition or this instruction, always ask your healthcare professional. [place] [address] any warranty or liability for your use of this information.

## 2021-05-11 ENCOUNTER — TELEPHONE (OUTPATIENT)
Dept: FAMILY MEDICINE CLINIC | Age: 66
End: 2021-05-11

## 2021-05-11 NOTE — TELEPHONE ENCOUNTER
I tried calling patient on behalf of Masha Woodward, but she did not answer.  I left a voicemail stating that  might call her at 2:30 pm.

## 2021-05-12 ENCOUNTER — VIRTUAL VISIT (OUTPATIENT)
Dept: FAMILY MEDICINE CLINIC | Age: 66
End: 2021-05-12
Payer: MEDICARE

## 2021-05-12 DIAGNOSIS — Z00.00 MEDICARE ANNUAL WELLNESS VISIT, SUBSEQUENT: Primary | ICD-10-CM

## 2021-05-12 DIAGNOSIS — E66.01 SEVERE OBESITY (HCC): ICD-10-CM

## 2021-05-12 DIAGNOSIS — Z12.11 SCREEN FOR COLON CANCER: ICD-10-CM

## 2021-05-12 DIAGNOSIS — Z78.0 POSTMENOPAUSAL STATE: ICD-10-CM

## 2021-05-12 DIAGNOSIS — B00.9 HSV-2 INFECTION: ICD-10-CM

## 2021-05-12 DIAGNOSIS — R35.0 FREQUENT URINATION: ICD-10-CM

## 2021-05-12 DIAGNOSIS — Z13.39 SCREENING FOR ALCOHOLISM: ICD-10-CM

## 2021-05-12 DIAGNOSIS — Z13.31 SCREENING FOR DEPRESSION: ICD-10-CM

## 2021-05-12 PROCEDURE — G9899 SCRN MAM PERF RSLTS DOC: HCPCS | Performed by: FAMILY MEDICINE

## 2021-05-12 PROCEDURE — G0439 PPPS, SUBSEQ VISIT: HCPCS | Performed by: FAMILY MEDICINE

## 2021-05-12 PROCEDURE — G8400 PT W/DXA NO RESULTS DOC: HCPCS | Performed by: FAMILY MEDICINE

## 2021-05-12 PROCEDURE — G8427 DOCREV CUR MEDS BY ELIG CLIN: HCPCS | Performed by: FAMILY MEDICINE

## 2021-05-12 PROCEDURE — 1101F PT FALLS ASSESS-DOCD LE1/YR: CPT | Performed by: FAMILY MEDICINE

## 2021-05-12 PROCEDURE — 3017F COLORECTAL CA SCREEN DOC REV: CPT | Performed by: FAMILY MEDICINE

## 2021-05-12 PROCEDURE — G8756 NO BP MEASURE DOC: HCPCS | Performed by: FAMILY MEDICINE

## 2021-05-12 PROCEDURE — G8432 DEP SCR NOT DOC, RNG: HCPCS | Performed by: FAMILY MEDICINE

## 2021-05-12 RX ORDER — ZOSTER VACCINE RECOMBINANT, ADJUVANTED 50 MCG/0.5
KIT INTRAMUSCULAR
Qty: 0.5 ML | Refills: 1 | Status: SHIPPED | OUTPATIENT
Start: 2021-05-12 | End: 2022-05-26

## 2021-05-12 RX ORDER — VALACYCLOVIR HYDROCHLORIDE 1 G/1
1000 TABLET, FILM COATED ORAL 2 TIMES DAILY
Qty: 20 TAB | Refills: 5 | Status: SHIPPED | OUTPATIENT
Start: 2021-05-12

## 2021-05-12 NOTE — PATIENT INSTRUCTIONS
Medicare Wellness Visit, Female The best way to live healthy is to have a lifestyle where you eat a well-balanced diet, exercise regularly, limit alcohol use, and quit all forms of tobacco/nicotine, if applicable. Regular preventive services are another way to keep healthy. Preventive services (vaccines, screening tests, monitoring & exams) can help personalize your care plan, which helps you manage your own care. Screening tests can find health problems at the earliest stages, when they are easiest to treat. [facility] follows the current, evidence-based guidelines published by the [facility] Rogelio May ([facility]) when recommending preventive services for our patients. Because we follow these guidelines, sometimes recommendations change over time as research supports it. (For example, mammograms used to be recommended annually. Even though Medicare will still pay for an annual mammogram, the newer guidelines recommend a mammogram every two years for women of average risk). Of course, you and your doctor may decide to screen more often for some diseases, based on your risk and your co-morbidities (chronic disease you are already diagnosed with). Preventive services for you include: - Medicare offers their members a free annual wellness visit, which is time for you and your primary care provider to discuss and plan for your preventive service needs. Take advantage of this benefit every year! 
-All adults over the age of 72 should receive the recommended pneumonia vaccines. Current USPSTF guidelines recommend a series of two vaccines for the best pneumonia protection.  
-All adults should have a flu vaccine yearly and a tetanus vaccine every 10 years.  
-All adults age 48 and older should receive the shingles vaccines (series of two vaccines).      
-All adults age 38-68 who are overweight should have a diabetes screening test once every three years.  
-All adults born between [birth year] and 1965 should be screened once for Hepatitis C. 
-Other screening tests and preventive services for persons with diabetes include: an eye exam to screen for diabetic retinopathy, a kidney function test, a foot exam, and stricter control over your cholesterol.  
-Cardiovascular screening for adults with routine risk involves an electrocardiogram (ECG) at intervals determined by your doctor.  
-Colorectal cancer screenings should be done for adults age 54-65 with no increased risk factors for colorectal cancer. There are a number of acceptable methods of screening for this type of cancer. Each test has its own benefits and drawbacks. Discuss with your doctor what is most appropriate for you during your annual wellness visit. The different tests include: colonoscopy (considered the best screening method), a fecal occult blood test, a fecal DNA test, and sigmoidoscopy. 
 
-A bone mass density test is recommended when a woman turns 65 to screen for osteoporosis. This test is only recommended one time, as a screening. Some providers will use this same test as a disease monitoring tool if you already have osteoporosis. -Breast cancer screenings are recommended every other year for women of normal risk, age 54-69. 
-Cervical cancer screenings for women over age 72 are only recommended with certain risk factors. Here is a list of your current Health Maintenance items (your personalized list of preventive services) with a due date: 
Health Maintenance Due Topic Date Due  
 COVID-19 Vaccine (1) Never done  Shingles Vaccine (1 of 2) Never done  Albumin Urine Test  05/25/2019  Bone Mineral Density   Never done [facility] Annual Well Visit  02/24/2021

## 2021-05-12 NOTE — PROGRESS NOTES
This is the Subsequent Medicare Annual Wellness Exam, performed 12 months or more after the Initial AWV or the last Subsequent AWV    I have reviewed the patient's medical history in detail and updated the computerized patient record. Assessment/Plan   Education and counseling provided:  Are appropriate based on today's review and evaluation    1. Medicare annual wellness visit, subsequent  -     [place] ANNUAL ALCOHOL SCREEN 15 MIN  2. Screening for alcoholism  -     [place] ANNUAL ALCOHOL SCREEN 15 MIN  3. Screening for depression  -     DEPRESSION SCREEN ANNUAL  4. Screen for colon cancer  -     REFERRAL FOR COLONOSCOPY  5. Postmenopausal state  -     DEXA BONE DENSITY STUDY AXIAL; Future  6. HSV-2 infection  Reports recent out brake started yesterday, having vesicles in the vaginal area. No fever.   -     valACYclovir (VALTREX) 1 gram tablet; Take 1 Tab by mouth two (2) times a day., Normal, Disp-20 Tab, R-5  7. Severe obesity ([address]  8. Frequent urination  -Reports having frequent urination w/o burning or pain with urination for the last couple of months, no recent changes. No back pain. She o on HCTZ. Discussed that there is a potential side effects of HCTZ. Advised to check BP daily x 1 week and will follow up in1 week. IF BP is well controlled (noted to be on the lower side under the chart review). Will consider stopping HCTZ, if persistent symptoms will check UA. If any burning or pain with urination occur the patient will come earlier to get UA.         Depression Risk Factor Screening:     3 most recent PHQ Screens 5/12/2021   Little interest or pleasure in doing things Not at all   Feeling down, depressed, irritable, or hopeless Not at all   Total Score PHQ 2 0       Alcohol Risk Screen    Do you average more than 1 drink per night or more than 7 drinks a week:  No    On any one occasion in the past three months have you have had more than 3 drinks containing alcohol:  No        Functional Ability and Level of Safety:    Hearing: Hearing is good. Activities of Daily Living: The home contains: no safety equipment. Ambulation: with difficulty, uses a cane     Fall Risk:  Fall Risk Assessment, last 12 mths 5/12/2021   Able to walk? Yes   Fall in past 12 months? 0   Do you feel unsteady? 0   Are you worried about falling 0   Number of falls in past 12 months -   Fall with injury?  -      Abuse Screen:  Patient is not abused       Cognitive Screening    Has your family/caregiver stated any concerns about your memory: no    Cognitive Screening: Normal - MMSE (Mini Mental Status Exam)    Health Maintenance Due     Health Maintenance Due   Topic Date Due    COVID-19 Vaccine (1) Never done    Shingrix Vaccine Age 50> (1 of 2) Never done    MICROALBUMIN Q1  05/25/2019    Bone Densitometry (Dexa) Screening  Never done       Patient Care Team   Patient Care Team:  Yarely Kessler MD as PCP - General (Family Medicine)  Yarely Kessler MD as PCP - [address] [place] [address]  [facility] Provider  Tucker Dueñas MD (Endocrinology)  Ozzie Spicer MD (Surgery)  Dr. Yasmani Parry -Pain specialist   History     Patient Active Problem List   Diagnosis Code    Hypertension I10    Diabetes ([address] E11.9    Hypercholesterolemia E78.00    Other chest pain R07.89    Neuropathy due to secondary diabetes ([address] E13.40    Lumbar stenosis M48.061    Lumbar herniated disc M51.26    Postcoital UTI N39.0    Type 2 diabetes mellitus with diabetic nephropathy, without long-term current use of insulin ([address] E11.21    Type 2 diabetes mellitus with hyperglycemia, without long-term current use of insulin ([address] E11.65    Closed nondisplaced fracture of first cervical vertebra (HCC) S12.001A    Closed fracture of multiple ribs of right side with routine healing S22.41XD    Type 2 diabetes mellitus with diabetic neuropathy (HCC) E11.40    HSV-2 infection B00.9    Breast cancer screening Z12.39    Cervical cancer screening Z12.4  Obesity (BMI 30.0-34. 9) E66.9    Shingles rash B02.9    S/P excision of lipoma Z98.890, Z86.018    Severe obesity (HCC) E66.01    Abscess L02.91    Soft tissue abscess of inguinal region L02.214    Infection of skin due to methicillin resistant Staphylococcus aureus (MRSA) A49.02     Past Medical History:   Diagnosis Date    [facility]     Genital herpes simplex type 2     GERD (gastroesophageal reflux disease)     no medication prescribed    Hypercholesterolemia     Hypertension     Narcolepsy     Neuropathy in diabetes ([facility]     Obesity (BMI 30.0-34. 9)       Past Surgical History:   Procedure Laterality Date    COLONOSCOPY N/A 2019    COLONOSCOPY performed by Charan Almazan MD at [facility] HX  SECTION  03/15/1979    HX  SECTION  1990    HX KNEE REPLACEMENT Bilateral     HX MOHS PROCEDURES Right     HX OTHER SURGICAL Right 2019    Excision of lipoma of the right buttock.  HX ROTATOR CUFF REPAIR Right     ME EXTRAC ERUPTED TOOTH/EXPOSED ROOT Right 14    3 UPPER RIGHT SIDE BACK TEETH     Current Outpatient Medications   Medication Sig Dispense Refill    varicella-zoster recombinant, PF, (Shingrix, PF,) 50 mcg/0.5 mL susr injection 0.5mL by IntraMUSCular route once now and then repeat in 2-6 months 0.5 mL 1    valACYclovir (VALTREX) 1 gram tablet Take 1 Tab by mouth two (2) times a day. 20 Tab 5    nystatin (MYCOSTATIN) topical cream Apply  to affected area two (2) times a day. 60 g 1    dulaglutide (Trulicity) 1.5 NR/9.5 mL sub-q pen INJECT 1 SYRINGE UNDER THE SKIN EVERY 7 DAYS 6 mL 4    metFORMIN (GLUCOPHAGE) 1,000 mg tablet Take 1 Tab by mouth two (2) times daily (with meals). 180 Tab 3    glipiZIDE (GLUCOTROL) 10 mg tablet TAKE 1 TABLET BY MOUTH TWICE DAILY 180 Tab 3    amLODIPine-benazepril (LOTREL) 10-20 mg per capsule Take 1 Cap by mouth daily.  90 Cap 3    hydroCHLOROthiazide (HYDRODIURIL) 25 mg tablet TAKE 1 TABLET BY MOUTH DAILY 90 Tab 3    rosuvastatin (CRESTOR) 10 mg tablet TAKE 1 TABLET BY MOUTH DAILY 90 Tab 3    traMADoL (ULTRAM) 50 mg tablet Take 50 mg by mouth two (2) times a day.  pregabalin (LYRICA) 100 mg capsule Take 100 mg by mouth three (3) times daily.  DULoxetine (CYMBALTA) 30 mg capsule TK ONE C PO  Q 12 H      ammonium lactate (LAC-HYDRIN) 12 % lotion JEMAL EXT TO ALL SURFACES OF BOTH FEET BID      cyanocobalamin (VITAMIN B12) 100 mcg tablet Take 1 Tab by mouth daily. 90 Tab 3    naloxone (NARCAN) 2 mg/actuation spry Use 1 spray intranasally, then discard. Repeat with new spray every 2 min as needed for opioid overdose symptoms, alternating nostrils. 1 Vial 1    Blood-Glucose Meter (ACCU-CHEK GEORGINA PLUS METER) misc Test once daily Dx Code: E11.65 1 Each 0    glucose blood VI test strips (ACCU-CHEK GEORGINA PLUS TEST STRP) strip Test once daily Dx Code: E11.65 100 Strip 3    lancets (ACCU-CHEK FASTCLIX LANCET DRUM) misc Test once daily Dx Code: E11.65 100 Each 3    peg 400-propylene glycol (SYSTANE, PROPYLENE GLYCOL,) 0.4-0.3 % drop Administer 1 Drop to both eyes as needed.  omega-3 fatty acids-vitamin e (FISH OIL) 1,000 mg Cap Take 1 Cap by mouth daily. Allergies   Allergen Reactions    Aspirin Hives     Tolerates ibuprofen and naproxen    Clindamycin Rash and Hives       Family History   Problem Relation Age of Onset    Diabetes Mother     Heart Disease Mother     Hypertension Mother    [facility] Arthritis-rheumatoid Mother     Diabetes Father     Heart Disease Father     Cancer Maternal Aunt         bone    Diabetes Sister      Social History     Tobacco Use    Smoking status: Never Smoker    Smokeless tobacco: Never Used   Substance Use Topics    Alcohol use:  Yes     Alcohol/week: 0.0 - 1.0 standard drinks     Frequency: Never       Dillon Brown, who was evaluated through a synchronous (real-time) audio-video encounter, and/or her healthcare decision maker, is aware that it is a billable service, with coverage as determined by her insurance carrier. She provided verbal consent to proceed: Yes, and patient identification was verified. It was conducted pursuant to the emergency declaration under the [address] [place] [address] authority and the [employer] and [employer] Act. A caregiver was present when appropriate. Ability to conduct physical exam was limited. I was in the office. The patient was at home.     Danny Lea MD

## 2021-05-12 NOTE — ACP (ADVANCE CARE PLANNING)
Non-Provider Advance Care Planning (ACP) Note    Date of ACP Conversation: 5/12/2021  Persons included in Conversation: patient  Length of ACP Conversation in minutes: <16 minutes (Non-Billable)    Conversation requested by:   Provider    Authorized Decision Maker (if patient is incapable of making informed decisions): This person is:  Sheryle Rude (the patient's daughter)      General ACP for ALL Patients with Decision Making Capacity:    Advance Directive Conversation with Patients who have not yet planned:  Importance of advance care planning, including choosing a healthcare agent to communicate patient's healthcare decisions if patient lost the ability to make decisions, such as after a sudden illness or accident  \"Have you thought about who you would want to make decisions about your future healthcare and treatment if you were unable to?\" Yes: Name and Relationship Sheryle Rude   \"Would this person honor your decisions even if he/she does not agree with them? \"  YES    Interventions Provided:  Recommended completion of Advance Directive after review of materials, conversation with prospective healthcare agent about (and others as needed), and readiness to complete a written plan  The patient desires to be DNR.

## 2021-05-19 ENCOUNTER — VIRTUAL VISIT (OUTPATIENT)
Dept: FAMILY MEDICINE CLINIC | Age: 66
End: 2021-05-19
Payer: MEDICARE
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
## 2021-05-19 DIAGNOSIS — M25.559 HIP PAIN: ICD-10-CM

## 2021-05-19 DIAGNOSIS — R35.0 URINARY FREQUENCY: Primary | ICD-10-CM

## 2021-05-19 DIAGNOSIS — I10 ESSENTIAL HYPERTENSION WITH GOAL BLOOD PRESSURE LESS THAN 130/80: ICD-10-CM

## 2021-05-19 PROCEDURE — G8427 DOCREV CUR MEDS BY ELIG CLIN: HCPCS | Performed by: FAMILY MEDICINE

## 2021-05-19 PROCEDURE — 1090F PRES/ABSN URINE INCON ASSESS: CPT | Performed by: FAMILY MEDICINE

## 2021-05-19 PROCEDURE — G8756 NO BP MEASURE DOC: HCPCS | Performed by: FAMILY MEDICINE

## 2021-05-19 PROCEDURE — G8400 PT W/DXA NO RESULTS DOC: HCPCS | Performed by: FAMILY MEDICINE

## 2021-05-19 PROCEDURE — 99214 OFFICE O/P EST MOD 30 MIN: CPT | Performed by: FAMILY MEDICINE

## 2021-05-19 PROCEDURE — G8432 DEP SCR NOT DOC, RNG: HCPCS | Performed by: FAMILY MEDICINE

## 2021-05-19 PROCEDURE — 1101F PT FALLS ASSESS-DOCD LE1/YR: CPT | Performed by: FAMILY MEDICINE

## 2021-05-19 PROCEDURE — 3017F COLORECTAL CA SCREEN DOC REV: CPT | Performed by: FAMILY MEDICINE

## 2021-05-19 PROCEDURE — G9899 SCRN MAM PERF RSLTS DOC: HCPCS | Performed by: FAMILY MEDICINE

## 2021-05-19 NOTE — PROGRESS NOTES
Boom Craft  72 y.o. female  1955  [address] [facility]  [id number]   [address]:    Telemedicine Progress Note  Alonso Barlow MD       Encounter Date and Time: May 19, 2021 at 7:34 AM    Consent: Boom Craft, who was seen by synchronous (real-time) audio-video technology, and/or her healthcare decision maker, is aware that this patient-initiated, Telehealth encounter on 5/19/2021 is a billable service, with coverage as determined by her insurance carrier. She is aware that she may receive a bill and has provided verbal consent to proceed: Yes. Chief Complaint   Patient presents with    Follow Up Chronic Condition    Hypertension     History of Present Illness   Boom Craft is a 72 y.o. female was evaluated by synchronous (real-time) audio-video technology from home, through a secure [employer]  patient portal.    1. Urinary frequency  She continues to have urinary frequency w/o burning or pain with urination. No pelvic pressure of back pain. No fever, no chills     2. Hip pain  1 week ago she rolled over the bed and fell on the floor. Landed on the right side of the body. No head injury, no LOC. Since then she has been experiencing the pain in the right hip. The pain is in the outer aspect of the hip w/o radiation, aggravated by lying on the right side of the body, alleviated by rest. Intermittent, 5/10. No numbness or tingling on the foot. No gait change. 3. Hypertension   Has been checking her BP and in 130-140s/80s. Compliant with home antihypertensive regimen. Review of Systems   Review of Systems   Constitutional: Negative for chills and fever. Cardiovascular: Negative for chest pain and palpitations. Genitourinary: Positive for frequency. Negative for flank pain, hematuria and urgency. Musculoskeletal: Positive for falls and joint pain.        Vitals/Objective:     General: alert, cooperative, no distress   Mental  status: mental status: alert, oriented to person, place, and time, normal mood, behavior, speech, dress, motor activity, and thought processes   Resp: resp: normal effort and no respiratory distress   Neuro: neuro: no gross deficits   Skin: skin: no discoloration or lesions of concern on visible areas   Due to this being a TeleHealth evaluation, many elements of the physical examination are unable to be assessed. Assessment and Plan:   Time-based coding, delete if not needed: I spent at least 25 minutes with this established patient, and >50% of the time was spent counseling and/or coordinating care regarding hip pain and urinary frequency    1. Urinary frequency  W/o dysuria. Persistent. No abdominal pain. Will check UA with urine culture to rule infectious etiology. If UA with Ucx unremarkable, will consider decreasing the dose of HCTZ and a potential contributor.   - AMB POC URINALYSIS DIP STICK AUTO W/O MICRO  - CULTURE, URINE; Future  - CULTURE, URINE    2. Hip pain  New onset. S/p fall. DDx inclucdes hip contusion, trochanteric bursitis, hip fracture/ dislocation is less likely given intermittent pain. Will evaluated in-person in clinic tomorrow, will get XR given the fall. 3. Essential hypertension with goal blood pressure less than 130/80  Continue the same regimen for now. Will recheck BP tomorrow. 4. Fall, initial encounter  With subsequent hip pain. No head injury, no LOC. Will evaluate in the clinic tomorrow. Time spent in direct conversation with the patient to include medical condition(s) discussed, assessment and treatment plan:       We discussed the expected course, resolution and complications of the diagnosis(es) in detail. Medication risks, benefits, costs, interactions, and alternatives were discussed as indicated. I advised her to contact the office if her condition worsens, changes or fails to improve as anticipated.  She expressed understanding with the diagnosis(es) and plan. Patient understands that this encounter was a temporary measure, and the importance of further follow up and examination was emphasized. Patient verbalized understanding. Patient informed to follow up: Tomorrow (May 20, 2021) in person for hip pain evaluation. Electronically Signed: Silva Levy MD    CPT Codes 97613-28334 for Established Patients may apply to this Telehealth Visit. POS code: 18Amrit Martins is a 72 y.o. female who was evaluated by an audio-video encounter for concerns as above. Patient identification was verified prior to start of the visit. A caregiver was present when appropriate. Due to this being a TeleHealth encounter (During [facility] public health emergency), evaluation of the following organ systems was limited: Vitals/Constitutional/EENT/Resp/CV/GI//MS/Neuro/Skin/Heme-Lymph-Imm. Pursuant to the emergency declaration under the [facility][note 3][facility], [facility] waiver authority and the [employer] and [employer] Act, this Virtual Visit was conducted, with patient's (and/or legal guardian's) consent, to reduce the patient's risk of exposure to COVID-19 and provide necessary medical care. Services were provided through a synchronous discussion virtually to substitute for in-person clinic visit. I was at home. The patient was at home. History   Patients past medical, surgical and family histories were reviewed and updated. Past Medical History:   Diagnosis Date    Diabetes ([facility] 1995    Genital herpes simplex type 2     GERD (gastroesophageal reflux disease)     no medication prescribed    Hypercholesterolemia     Hypertension     Narcolepsy     Neuropathy in diabetes ([facility]     Obesity (BMI 30.0-34. 9)      Past Surgical History:   Procedure Laterality Date    COLONOSCOPY N/A 8/6/2019    [facility]  HX  SECTION  03/15/1979    HX  SECTION  1990    HX KNEE REPLACEMENT Bilateral 2005    HX MOHS PROCEDURES Right     HX OTHER SURGICAL Right 2019    Excision of lipoma of the right buttock.  HX ROTATOR CUFF REPAIR Right     HI EXTRAC ERUPTED TOOTH/EXPOSED ROOT Right 14    3 UPPER RIGHT SIDE BACK TEETH     Family History   Problem Relation Age of Onset    Diabetes Mother     Heart Disease Mother     Hypertension Mother    [facility] Arthritis-rheumatoid Mother     Diabetes Father     Heart Disease Father     Cancer Maternal Aunt         bone    Diabetes Sister      Social History     Tobacco Use    Smoking status: Never Smoker    Smokeless tobacco: Never Used   Substance Use Topics    Alcohol use: Yes     Alcohol/week: 0.0 - 1.0 standard drinks    Drug use: No     Patient Active Problem List   Diagnosis Code    Hypertension I10    Diabetes ([facility] E11.9    Hypercholesterolemia E78.00    Other chest pain R07.89    Neuropathy due to secondary diabetes ([facility] E13.40    Lumbar stenosis M48.061    Lumbar herniated disc M51.26    Postcoital UTI N39.0    Type 2 diabetes mellitus with diabetic nephropathy, without long-term current use of insulin ([facility]) E11.21    Type 2 diabetes mellitus with hyperglycemia, without long-term current use of insulin ([facility]) E11.65    Closed nondisplaced fracture of first cervical vertebra ([facility]) S12.001A    Closed fracture of multiple ribs of right side with routine healing S22.41XD    Type 2 diabetes mellitus with diabetic neuropathy ([facility]) E11.40    HSV-2 infection B00.9    Breast cancer screening Z12.39    Cervical cancer screening Z12.4    Obesity (BMI 30.0-34. 9) E66.9    Shingles rash B02.9    S/P excision of lipoma Z98.890, Z86.018    Severe obesity ([facility]) E66.01    Abscess L02.91    Soft tissue abscess of inguinal region L02.214    Infection of skin due to methicillin resistant Staphylococcus aureus (MRSA) A49.02 Current Medications/Allergies   Medications and Allergies reviewed:    Current Outpatient Medications   Medication Sig Dispense Refill    varicella-zoster recombinant, PF, (Shingrix, PF,) 50 mcg/0.5 mL susr injection 0.5mL by IntraMUSCular route once now and then repeat in 2-6 months 0.5 mL 1    valACYclovir (VALTREX) 1 gram tablet Take 1 Tab by mouth two (2) times a day. 20 Tab 5    nystatin (MYCOSTATIN) topical cream Apply  to affected area two (2) times a day. 60 g 1    dulaglutide (Trulicity) 1.5 PP/8.8 mL sub-q pen INJECT 1 SYRINGE UNDER THE SKIN EVERY 7 DAYS 6 mL 4    metFORMIN (GLUCOPHAGE) 1,000 mg tablet Take 1 Tab by mouth two (2) times daily (with meals). 180 Tab 3    glipiZIDE (GLUCOTROL) 10 mg tablet TAKE 1 TABLET BY MOUTH TWICE DAILY 180 Tab 3    amLODIPine-benazepril (LOTREL) 10-20 mg per capsule Take 1 Cap by mouth daily. 90 Cap 3    hydroCHLOROthiazide (HYDRODIURIL) 25 mg tablet TAKE 1 TABLET BY MOUTH DAILY 90 Tab 3    rosuvastatin (CRESTOR) 10 mg tablet TAKE 1 TABLET BY MOUTH DAILY 90 Tab 3    traMADoL (ULTRAM) 50 mg tablet Take 50 mg by mouth two (2) times a day.  pregabalin (LYRICA) 100 mg capsule Take 100 mg by mouth three (3) times daily.  DULoxetine (CYMBALTA) 30 mg capsule TK ONE C PO  Q 12 H      ammonium lactate (LAC-HYDRIN) 12 % lotion JEMAL EXT TO ALL SURFACES OF BOTH FEET BID      cyanocobalamin (VITAMIN B12) 100 mcg tablet Take 1 Tab by mouth daily. 90 Tab 3    naloxone (NARCAN) 2 mg/actuation spry Use 1 spray intranasally, then discard. Repeat with new spray every 2 min as needed for opioid overdose symptoms, alternating nostrils.  1 Vial 1    Blood-Glucose Meter (ACCU-CHEK GEORGINA PLUS METER) misc Test once daily Dx Code: E11.65 1 Each 0    glucose blood VI test strips (ACCU-CHEK GEORGINA PLUS TEST STRP) strip Test once daily Dx Code: E11.65 100 Strip 3    lancets (ACCU-CHEK FASTCLIX LANCET DRUM) misc Test once daily Dx Code: E11.65 100 Each 3    peg 400-propylene glycol (SYSTANE, PROPYLENE GLYCOL,) 0.4-0.3 % drop Administer 1 Drop to both eyes as needed.  omega-3 fatty acids-vitamin e (FISH OIL) 1,000 mg Cap Take 1 Cap by mouth daily.        Allergies   Allergen Reactions    Aspirin Hives     Tolerates ibuprofen and naproxen    Clindamycin Rash and Hives

## 2021-05-20 ENCOUNTER — OFFICE VISIT (OUTPATIENT)
Dept: FAMILY MEDICINE CLINIC | Age: 66
End: 2021-05-20

## 2021-05-20 VITALS
HEIGHT: 63 IN | RESPIRATION RATE: 20 BRPM | OXYGEN SATURATION: 98 % | TEMPERATURE: 98.2 F | HEART RATE: 83 BPM | SYSTOLIC BLOOD PRESSURE: 112 MMHG | DIASTOLIC BLOOD PRESSURE: 71 MMHG | WEIGHT: 200 LBS | BODY MASS INDEX: 35.44 KG/M2

## 2021-05-20 DIAGNOSIS — R63.5 WEIGHT GAIN: ICD-10-CM

## 2021-05-20 DIAGNOSIS — M25.552 HIP PAIN, ACUTE, LEFT: ICD-10-CM

## 2021-05-20 DIAGNOSIS — E11.65 TYPE 2 DIABETES MELLITUS WITH HYPERGLYCEMIA, UNSPECIFIED WHETHER LONG TERM INSULIN USE (HCC): ICD-10-CM
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
## 2021-05-20 DIAGNOSIS — M70.62 TROCHANTERIC BURSITIS OF LEFT HIP: Primary | ICD-10-CM

## 2021-05-20 DIAGNOSIS — R30.0 DYSURIA: ICD-10-CM

## 2021-05-20 LAB
BILIRUB UR QL STRIP: NEGATIVE
GLUCOSE UR-MCNC: NEGATIVE MG/DL
KETONES P FAST UR STRIP-MCNC: NEGATIVE MG/DL
PH UR STRIP: 5 [PH] (ref 4.6–8)
PROT UR QL STRIP: NEGATIVE
SP GR UR STRIP: 1.02 (ref 1–1.03)
UA UROBILINOGEN AMB POC: NORMAL (ref 0.2–1)
URINALYSIS CLARITY POC: CLEAR
URINALYSIS COLOR POC: YELLOW
URINE BLOOD POC: NEGATIVE
URINE LEUKOCYTES POC: NEGATIVE
URINE NITRITES POC: NEGATIVE

## 2021-05-20 PROCEDURE — G8754 DIAS BP LESS 90: HCPCS | Performed by: FAMILY MEDICINE

## 2021-05-20 PROCEDURE — 81003 URINALYSIS AUTO W/O SCOPE: CPT | Performed by: FAMILY MEDICINE

## 2021-05-20 PROCEDURE — 99214 OFFICE O/P EST MOD 30 MIN: CPT | Performed by: FAMILY MEDICINE

## 2021-05-20 PROCEDURE — G8432 DEP SCR NOT DOC, RNG: HCPCS | Performed by: FAMILY MEDICINE

## 2021-05-20 PROCEDURE — 1101F PT FALLS ASSESS-DOCD LE1/YR: CPT | Performed by: FAMILY MEDICINE

## 2021-05-20 PROCEDURE — 3046F HEMOGLOBIN A1C LEVEL >9.0%: CPT | Performed by: FAMILY MEDICINE

## 2021-05-20 PROCEDURE — G8536 NO DOC ELDER MAL SCRN: HCPCS | Performed by: FAMILY MEDICINE

## 2021-05-20 PROCEDURE — 1090F PRES/ABSN URINE INCON ASSESS: CPT | Performed by: FAMILY MEDICINE

## 2021-05-20 PROCEDURE — G9899 SCRN MAM PERF RSLTS DOC: HCPCS | Performed by: FAMILY MEDICINE

## 2021-05-20 PROCEDURE — 3017F COLORECTAL CA SCREEN DOC REV: CPT | Performed by: FAMILY MEDICINE

## 2021-05-20 PROCEDURE — G8400 PT W/DXA NO RESULTS DOC: HCPCS | Performed by: FAMILY MEDICINE

## 2021-05-20 PROCEDURE — G8427 DOCREV CUR MEDS BY ELIG CLIN: HCPCS | Performed by: FAMILY MEDICINE

## 2021-05-20 PROCEDURE — 2022F DILAT RTA XM EVC RTNOPTHY: CPT | Performed by: FAMILY MEDICINE

## 2021-05-20 PROCEDURE — G8752 SYS BP LESS 140: HCPCS | Performed by: FAMILY MEDICINE

## 2021-05-20 PROCEDURE — G8417 CALC BMI ABV UP PARAM F/U: HCPCS | Performed by: FAMILY MEDICINE

## 2021-05-20 RX ORDER — TRIAMCINOLONE ACETONIDE 40 MG/ML
40 INJECTION, SUSPENSION INTRA-ARTICULAR; INTRAMUSCULAR ONCE
Qty: 1 ML | Refills: 0
Start: 2021-05-20 | End: 2021-05-20

## 2021-05-20 RX ORDER — LIDOCAINE HYDROCHLORIDE 10 MG/ML
1 INJECTION, SOLUTION EPIDURAL; INFILTRATION; INTRACAUDAL; PERINEURAL ONCE
Qty: 1 ML | Refills: 0
Start: 2021-05-20 | End: 2021-05-20

## 2021-05-20 NOTE — PROGRESS NOTES
Subjective:   Roque Client is an 72 y.o. female who presents for evaluation of the weight gain and follow up on  hip pain. HPI  Chief Complaint   Patient presents with    Hip Pain     patient fell out of bed last week and hurt her left hip    Urinary Frequency     x a couple of months, no burning or other sx         1. Hip pain   1 week ago she rolled over the bed and fell on the floor. Landed on the right side of the body. No head injury, no LOC. Since then she has been experiencing the pain in the right hip. The pain is in the outer aspect of the hip w/o radiation, aggravated by lying on the right side of the body, alleviated by rest. Intermittent, 5/10. No numbness or tingling on the foot. No gait change. 2.Weight gain  She noticed that despite of diet and exercise and exercise she keeps gaining the weight. She was able to loose couple     3. Urinary frequency   Continues to have urinary frequency w/o burning or pain with urination. Allergies - reviewed: Allergies   Allergen Reactions    Aspirin Hives     Tolerates ibuprofen and naproxen    Clindamycin Rash and Hives         Medications - reviewed:  Current Outpatient Medications   Medication Sig    triamcinolone acetonide (Kenalog) 40 mg/mL injection 1 mL by IntraBURSal route once for 1 dose.  lidocaine, PF, (XYLOCAINE) 10 mg/mL (1 %) injection 1 mL by IntraVENous route once for 1 dose.  valACYclovir (VALTREX) 1 gram tablet Take 1 Tab by mouth two (2) times a day.  nystatin (MYCOSTATIN) topical cream Apply  to affected area two (2) times a day.  dulaglutide (Trulicity) 1.5 XV/6.5 mL sub-q pen INJECT 1 SYRINGE UNDER THE SKIN EVERY 7 DAYS    metFORMIN (GLUCOPHAGE) 1,000 mg tablet Take 1 Tab by mouth two (2) times daily (with meals).  glipiZIDE (GLUCOTROL) 10 mg tablet TAKE 1 TABLET BY MOUTH TWICE DAILY    amLODIPine-benazepril (LOTREL) 10-20 mg per capsule Take 1 Cap by mouth daily.     hydroCHLOROthiazide (HYDRODIURIL) 25 mg tablet TAKE 1 TABLET BY MOUTH DAILY    rosuvastatin (CRESTOR) 10 mg tablet TAKE 1 TABLET BY MOUTH DAILY    traMADoL (ULTRAM) 50 mg tablet Take 50 mg by mouth two (2) times a day.  pregabalin (LYRICA) 100 mg capsule Take 100 mg by mouth three (3) times daily.  ammonium lactate (LAC-HYDRIN) 12 % lotion JEMAL EXT TO ALL SURFACES OF BOTH FEET BID    cyanocobalamin (VITAMIN B12) 100 mcg tablet Take 1 Tab by mouth daily.  naloxone (NARCAN) 2 mg/actuation spry Use 1 spray intranasally, then discard. Repeat with new spray every 2 min as needed for opioid overdose symptoms, alternating nostrils.  Blood-Glucose Meter (ACCU-CHEK GEORGINA PLUS METER) misc Test once daily Dx Code: E11.65    glucose blood VI test strips (ACCU-CHEK GEORGINA PLUS TEST STRP) strip Test once daily Dx Code: E11.65    lancets (ACCU-CHEK FASTCLIX LANCET DRUM) misc Test once daily Dx Code: E11.65    peg 400-propylene glycol (SYSTANE, PROPYLENE GLYCOL,) 0.4-0.3 % drop Administer 1 Drop to both eyes as needed.  omega-3 fatty acids-vitamin e (FISH OIL) 1,000 mg Cap Take 1 Cap by mouth daily.  varicella-zoster recombinant, PF, (Shingrix, PF,) 50 mcg/0.5 mL susr injection 0.5mL by IntraMUSCular route once now and then repeat in 2-6 months (Patient not taking: Reported on 5/20/2021)    DULoxetine (CYMBALTA) 30 mg capsule TK ONE C PO  Q 12 H (Patient not taking: Reported on 5/20/2021)     No current facility-administered medications for this visit. Past Medical History - reviewed:  Past Medical History:   Diagnosis Date    Diabetes ([facility] 1995    Genital herpes simplex type 2     GERD (gastroesophageal reflux disease)     no medication prescribed    Hypercholesterolemia     Hypertension     Narcolepsy     Neuropathy in diabetes ([facility]     Obesity (BMI 30.0-34. 9)              Review of Systems   CONSTITUTIONAL: denies fever. Denies chills. CARDIOVASCULAR: denies chest pain.  Denies palpitations  RESPIRATORY: denies shortness of breath  GI: denies abdominal pain. Denies change in stools. Denies hematochezia/melana  : +urinary frequency   MUSCULOSKELETAL: +Left hip pain   SKIN: denies rash. Denies easy bruising        Objective:     Visit Vitals  /71 (BP 1 Location: Left arm, BP Patient Position: Sitting, BP Cuff Size: Large adult)   Pulse 83   Temp 98.2 °F (36.8 °C) (Oral)   Resp 20   Ht 5' 3\" (1.6 m)   Wt 200 lb (90.7 kg)   LMP 01/01/2008 (LMP Unknown)   SpO2 98%   BMI 35.43 kg/m²       General appearance - alert, well appearing, and in no distress  Neck - supple, no significant adenopathy  Musculoskeletal:    MSK - Hip left:    Deformity: None    ROM:     Flexion: Normal    Extension: Normal     Internal/external rotation: Normal      Gait: Normal       Palpation: +Tnderness to palpation over the left trochanter     L1-L5: No tenderness    Sacrum: No tenderness    Coccyx: No tenderness    Left Paraspinal: No tenderness    Right Paraspinal: No tenderness    Greater trochanter: No tenderness    Ischial Tuberosity: No tenderness    Piriformis: No tenderness       Strength (0-5/5)    Hip Flexion:   Left: 5/5  Right: 5/5    Hip Extension:  Left: 5/5  Right: 5/5    Hip Abduction:  Left: 5/5  Right: 5/5    Hip Adduction:  Left: 5/5  Right: 5/5    Knee Extension:  Left: 5/5  Right: 5/5    Knee Flexion:   Left: 5/5  Right: 5/5       Sensation: Intact, no deficits      DTR:    Patella:  Left: +2  Right: +2    Achilles:   Left: +2  Right: +2     Special test:    Straight leg: Left: Negative  Right: Negative    Yannis: Left: Negative  Right: Negative          Extremities - peripheral pulses normal, no pedal edema, no clubbing or cyanosis  Skin - normal coloration and turgor, no rashes, no suspicious skin lesions noted      Assessment:   Brooks Austin is a 72 y.o. female who was seen today for:    ICD-10-CM ICD-9-CM    1.  Trochanteric bursitis of left hip  M70.62 726.5 TRIAMCINOLONE ACETONIDE INJ      triamcinolone acetonide (Kenalog) 40 mg/mL injection      lidocaine, PF, (XYLOCAINE) 10 mg/mL (1 %) injection   2. Dysuria  R30.0 788.1 AMB POC URINALYSIS DIP STICK AUTO W/O MICRO      CULTURE, URINE      CULTURE, URINE   3. Hip pain, acute, left  M25.552 719.45 XR HIP LT W OR WO PELV 2-3 VWS      TRIAMCINOLONE ACETONIDE INJ   4. Fall, initial encounter  [address] XXXA E888.9 XR HIP LT W OR WO PELV 2-3 VWS   5. Weight gain  R63.5 783.1 TSH 3RD GENERATION      TSH 3RD GENERATION      CANCELED: HEMOGLOBIN A1C WITH EAG      CANCELED: HEMOGLOBIN A1C WITH EAG   6. Type 2 diabetes mellitus with hyperglycemia, unspecified whether long term insulin use (HCC)  E11.65 250.00 CANCELED: HEMOGLOBIN A1C WITH EAG      CANCELED: HEMOGLOBIN A1C WITH EAG           Plan:   1. Trochanteric bursitis in the setting of recent fall . XR is negative for fracture or dislocation. The patient agreed to intrabursa injection. Please see the procedure note below. 2. Weight gain. Will check TSH to rule out thyroid disease. Discussed keeping food diary and will review at the next visit.          [facility]  OFFICE PROCEDURE PROGRESS NOTE        Chart reviewed for the following:   Ally Isabel MD, have reviewed the History, Physical and updated the Allergic reactions for Tryphena MACIEJ Mariscal     TIME OUT performed immediately prior to start of procedure:   Ally Isabel MD, have performed the following reviews on Sharlyne Spring prior to the start of the procedure:            * Patient was identified by name and date of birth   * Agreement on procedure being performed was verified  * Risks and Benefits explained to the patient  * Procedure site verified and marked as necessary  * Patient was positioned for comfort  * Consent was signed and verified     Time: 15.05 pm      Date of procedure: 5/20/2021    Procedure performed by:  Keely Grubbs MD    Provider assisted by: Derrick Sims MD-PGY-1  Patient assisted by: self    How tolerated by patient: tolerated the procedure well with no complications    Post Procedural Pain Scale: 0 - No Hurt    Comments: none               PROCEDURE NOTE:     Informed consent obtained verbally and risks, benefits and alternatives discussed. Time out performed, cross checking patient ID and procedure. The left hip hip was cleaned and prepped with sterile technique using Betadine x3, anesthetized with ethyl chloride spray and the trochanteric bursa was injected with Kenalog 40 mg and 3 ml of 1% lidocaine. The patient tolerated the procedure well and there were no complications. I have discussed the diagnosis with the patient and the intended plan as seen in the above orders. The patient has received an after-visit summary and questions were answered concerning future plans. I have discussed medication side effects and warnings with the patient as well. Informed pt to return to the office if new symptoms arise.       Lucy Thomas MD  Family Medicine Physician

## 2021-05-20 NOTE — PROGRESS NOTES
Identified Patient with two Patient identifiers (Name and ). Two Patient Identifiers confirmed. Reviewed record in preparation for visit and have obtained necessary documentation. Chief Complaint   Patient presents with    Hip Pain     patient fell out of bed last week and hurt her left hip    Urinary Frequency     x a couple of months, no burning or other sx       Visit Vitals  /71 (BP 1 Location: Left arm, BP Patient Position: Sitting, BP Cuff Size: Large adult)   Pulse 83   Temp 98.2 °F (36.8 °C) (Oral)   Resp 20   Ht 5' 3\" (1.6 m)   Wt 200 lb (90.7 kg)   SpO2 98%   BMI 35.43 kg/m²       1. Have you been to the ER, urgent care clinic since your last visit? Hospitalized since your last visit? No    2. Have you seen or consulted any other health care providers outside of the [address] [place] [address] since your last visit? Include any pap smears or colon screening.  No

## 2021-05-21 LAB — TSH SERPL DL<=0.005 MIU/L-ACNC: 0.8 UIU/ML (ref 0.45–4.5)

## 2021-05-22 LAB — BACTERIA UR CULT: NO GROWTH

## 2021-05-28 ENCOUNTER — HOSPITAL ENCOUNTER (OUTPATIENT)
Dept: MAMMOGRAPHY | Age: 66
Discharge: HOME | End: 2021-05-28
Attending: FAMILY MEDICINE
Payer: MEDICARE

## 2021-05-28 DIAGNOSIS — Z78.0 POSTMENOPAUSAL STATE: ICD-10-CM

## 2021-05-28 PROCEDURE — 77080 DXA BONE DENSITY AXIAL: CPT

## 2021-06-02 ENCOUNTER — TELEPHONE (OUTPATIENT)
Dept: FAMILY MEDICINE CLINIC | Age: 66
End: 2021-06-02

## 2021-06-02 DIAGNOSIS — E11.65 TYPE 2 DIABETES MELLITUS WITH HYPERGLYCEMIA, UNSPECIFIED WHETHER LONG TERM INSULIN USE (HCC): ICD-10-CM

## 2021-06-02 DIAGNOSIS — R63.5 WEIGHT GAIN: Primary | ICD-10-CM

## 2021-06-02 NOTE — TELEPHONE ENCOUNTER
I called the patient at [phone] and discussed test results. She is planning to see the endocrinologist. She is also interested in seeing the nutritionist to help with weight loss. Referral is placed.     3:31 PM  6/2/2021  Alonso Barlow MD

## 2021-06-24 DIAGNOSIS — E11.65 TYPE 2 DIABETES MELLITUS WITH HYPERGLYCEMIA, UNSPECIFIED WHETHER LONG TERM INSULIN USE (HCC): ICD-10-CM

## 2021-06-25 DIAGNOSIS — E11.65 TYPE 2 DIABETES MELLITUS WITH HYPERGLYCEMIA, UNSPECIFIED WHETHER LONG TERM INSULIN USE (HCC): ICD-10-CM

## 2021-06-25 RX ORDER — DULAGLUTIDE 1.5 MG/.5ML
INJECTION, SOLUTION SUBCUTANEOUS
Qty: 6 ML | Refills: 4 | Status: SHIPPED | OUTPATIENT
Start: 2021-06-25 | End: 2021-06-25 | Stop reason: SDUPTHER

## 2021-06-26 RX ORDER — DULAGLUTIDE 1.5 MG/.5ML
1.5 INJECTION, SOLUTION SUBCUTANEOUS
Qty: 6 ML | Refills: 4 | Status: SHIPPED | OUTPATIENT
Start: 2021-06-26 | End: 2021-09-09 | Stop reason: DRUGHIGH

## 2021-06-30 PROBLEM — Z98.890 S/P COLONOSCOPY: Status: ACTIVE | Noted: 2021-06-30

## 2021-07-07 ENCOUNTER — TELEPHONE (OUTPATIENT)
Dept: FAMILY MEDICINE CLINIC | Age: 66
End: 2021-07-07

## 2021-07-07 NOTE — TELEPHONE ENCOUNTER
I tried calling patient but she did not answer. I left a voicemail for her to give us a callback. It looks like she has some warts that are painful and I think the best option is to make an in-person appointment to get warts removed. If she calls back please schedule her an appointment.

## 2021-07-07 NOTE — TELEPHONE ENCOUNTER
----- Message from [facility] sent at 7/7/2021 12:44 PM EDT -----  Regarding: Dr. Laisha Pool first and last name: Abhay Bell      Reason for call: wart removal      Callback required yes/no and why: yes/pt request      Best contact number(s): [phone]      Details to clarify the request: warts growing underneath pts right arm/states that one has burst/pt states that they are very painful      [place] [facility]

## 2021-07-08 RX ORDER — DOXYCYCLINE 100 MG/1
100 CAPSULE ORAL 2 TIMES DAILY
Qty: 20 CAPSULE | Refills: 0 | Status: SHIPPED | OUTPATIENT
Start: 2021-07-08 | End: 2021-07-18

## 2021-07-08 NOTE — TELEPHONE ENCOUNTER
Returned the call per nurse request.    Patient disputed that reply. She said Dr. Bart Perez told her to call the office and see her when this boil came up again. She wants to know why the doctor told her this IF SHE WILL NOT SEE HER. Call the patient to discuss.

## 2021-07-08 NOTE — TELEPHONE ENCOUNTER
Dr. Syed Acosta  Received: Today  Brii Trevino, [address] [facility] Office  Patient return call     Caller's first and last name and relationship (if not the patient):  n/a       Best contact number(s):[phone]       Whose call is being returned:Ebenezer Bosch       Details to clarify the request:  Pt was offered an appt for Monday July 12th with Dr. Redd Alaniz but she declined.  Pt only wants to see Dr. Sury Jacob        [place]

## 2021-07-08 NOTE — TELEPHONE ENCOUNTER
I called the patient to discuss the issue. She states she is having another boil under her arm which is causing pain x 3 days. No drainage, no fever. The patient has know hx of hydradenitis. Will send the script for Doxycycline 100 mg and will see the patient in clinic ASAP for possible I&D. Will try to arrange the appointment for tomorrow with any available provider .  If no appointments are available will make appointment with myself on Monday (July 12) art 2.00 pm.  7:44 PM  7/8/2021  Soco Chapman MD

## 2021-07-12 ENCOUNTER — OFFICE VISIT (OUTPATIENT)
Dept: FAMILY MEDICINE CLINIC | Age: 66
End: 2021-07-12
Payer: MEDICARE

## 2021-07-12 VITALS
OXYGEN SATURATION: 97 % | WEIGHT: 202.4 LBS | RESPIRATION RATE: 16 BRPM | SYSTOLIC BLOOD PRESSURE: 105 MMHG | BODY MASS INDEX: 35.86 KG/M2 | DIASTOLIC BLOOD PRESSURE: 69 MMHG | HEIGHT: 63 IN | HEART RATE: 77 BPM | TEMPERATURE: 98.4 F

## 2021-07-12 DIAGNOSIS — L73.2 HYDRADENITIS: Primary | ICD-10-CM

## 2021-07-12 DIAGNOSIS — L73.2 HYDRADENITIS: ICD-10-CM

## 2021-07-12 PROCEDURE — 99214 OFFICE O/P EST MOD 30 MIN: CPT | Performed by: FAMILY MEDICINE

## 2021-07-12 PROCEDURE — G8752 SYS BP LESS 140: HCPCS | Performed by: FAMILY MEDICINE

## 2021-07-12 PROCEDURE — G8399 PT W/DXA RESULTS DOCUMENT: HCPCS | Performed by: FAMILY MEDICINE

## 2021-07-12 PROCEDURE — 1101F PT FALLS ASSESS-DOCD LE1/YR: CPT | Performed by: FAMILY MEDICINE

## 2021-07-12 PROCEDURE — 1090F PRES/ABSN URINE INCON ASSESS: CPT | Performed by: FAMILY MEDICINE

## 2021-07-12 PROCEDURE — 10060 I&D ABSCESS SIMPLE/SINGLE: CPT | Performed by: FAMILY MEDICINE

## 2021-07-12 PROCEDURE — G9899 SCRN MAM PERF RSLTS DOC: HCPCS | Performed by: FAMILY MEDICINE

## 2021-07-12 PROCEDURE — G8432 DEP SCR NOT DOC, RNG: HCPCS | Performed by: FAMILY MEDICINE

## 2021-07-12 PROCEDURE — G8536 NO DOC ELDER MAL SCRN: HCPCS | Performed by: FAMILY MEDICINE

## 2021-07-12 PROCEDURE — G8754 DIAS BP LESS 90: HCPCS | Performed by: FAMILY MEDICINE

## 2021-07-12 PROCEDURE — G8417 CALC BMI ABV UP PARAM F/U: HCPCS | Performed by: FAMILY MEDICINE

## 2021-07-12 PROCEDURE — 3017F COLORECTAL CA SCREEN DOC REV: CPT | Performed by: FAMILY MEDICINE

## 2021-07-12 PROCEDURE — G8427 DOCREV CUR MEDS BY ELIG CLIN: HCPCS | Performed by: FAMILY MEDICINE

## 2021-07-12 NOTE — PROGRESS NOTES
Richard Carr is a 72 y.o. female    Chief Complaint   Patient presents with    Cyst     Patient is coming in for boils under her right arm. She has been having this for months now. No other concerns. 1. Have you been to the ER, urgent care clinic since your last visit? Hospitalized since your last visit? No    2. Have you seen or consulted any other health care providers outside of the [address] [place] [address] since your last visit? Include any pap smears or colon screening. No      Visit Vitals  /69 (BP 1 Location: Right upper arm, BP Patient Position: Sitting)   Pulse 77   Temp 98.4 °F (36.9 °C) (Oral)   Resp 16   Ht 5' 3\" (1.6 m)   Wt 202 lb 6.4 oz (91.8 kg)   SpO2 97%   BMI 35.85 kg/m²           Health Maintenance Due   Topic Date Due    COVID-19 Vaccine (1) Never done    Shingrix Vaccine Age 50> (1 of 2) Never done    MICROALBUMIN Q1  05/25/2019         Medication Reconciliation completed, changes noted.   Please  Update medication list.

## 2021-07-12 NOTE — PATIENT INSTRUCTIONS
Hidradenitis Suppurativa: Care Instructions  Your Care Instructions     Hidradenitis suppurativa (say \"dms-edgd-zz-NY-tus sup-asif-uh-TY-vuh\") is a skin condition that causes lumps on the skin that look like pimples or boils. The lumps are usually painful and can break open and drain blood and bad-smelling pus. The condition can come and go for many years. Treatment for this condition may include antibiotics and other medicines. You may need surgery to remove the lumps. Home care includes wearing loose-fitting clothes and washing the area gently. You can help prevent lumps from coming back by staying at a healthy weight and not smoking. Doctors don't know exactly how this condition starts. But they do know that something irritates and inflames the hair follicles, causing them to swell and form lumps. This skin condition can't be spread from person to person (isn't contagious). Follow-up care is a key part of your treatment and safety. Be sure to make and go to all appointments, and call your doctor if you are having problems. It's also a good idea to know your test results and keep a list of the medicines you take. How can you care for yourself at home? Skin care    · Wash the area every day with mild soap. Use your hands rather than a washcloth or sponge when you wash that part of your body.     · Leave the affected areas uncovered when you can. If you have lumps that are draining, you can cover them with a bandage or other dressing. Put petroleum jelly (such as Vaseline) on the dressing to help keep it from sticking.     · Wear-loose fitting clothes that don't rub against the area. Avoid activities that cause skin to rub together.     · If you have pain, try a warm compress. Soak a towel or washcloth in warm water, wring it out, and place it on the affected skin for about 10 minutes. Medicines    · Be safe with medicines. Take your medicines exactly as prescribed.  Call your doctor if you think you are having a problem with your medicine. You will get more details on the specific medicines your doctor prescribes.     · If your doctor prescribed antibiotics, take them as directed. Do not stop taking them just because you feel better. You need to take the full course of antibiotics. Lifestyle choices    · If you smoke, think about quitting. Smoking can make the condition worse. If you need help quitting, talk to your doctor about stop-smoking programs and medicines. These can increase your chances of quitting for good.     · Stay at a healthy weight, or lose weight, by eating healthy foods and being physically active. Being overweight could make this condition worse. When should you call for help? Call your doctor now or seek immediate medical care if:    · You have symptoms of infection, such as:  ? Increased pain, swelling, warmth, or redness. ? Red streaks leading from the area. ? Pus draining from the area. ? A fever. Watch closely for changes in your health, and be sure to contact your doctor if:    · You do not get better as expected. Where can you learn more? Go to http://www.gray.com/  Enter W278 in the search box to learn more about \"Hidradenitis Suppurativa: Care Instructions. \"  Current as of: July 2, 2020               Content Version: 12.8  © 2006-2021 [employer]. Care instructions adapted under license by [employer] (which disclaims liability or warranty for this information). If you have questions about a medical condition or this instruction, always ask your healthcare professional. [place] [address] any warranty or liability for your use of this information.

## 2021-07-13 ENCOUNTER — OFFICE VISIT (OUTPATIENT)
Dept: SURGERY | Age: 66
End: 2021-07-13
Payer: MEDICARE

## 2021-07-13 VITALS
TEMPERATURE: 98.8 F | BODY MASS INDEX: 36.06 KG/M2 | RESPIRATION RATE: 18 BRPM | WEIGHT: 203.5 LBS | HEIGHT: 63 IN | SYSTOLIC BLOOD PRESSURE: 114 MMHG | HEART RATE: 95 BPM | OXYGEN SATURATION: 98 % | DIASTOLIC BLOOD PRESSURE: 70 MMHG

## 2021-07-13 DIAGNOSIS — L73.2 RIGHT AXILLARY HIDRADENITIS: Primary | ICD-10-CM

## 2021-07-13 PROBLEM — L02.214 SOFT TISSUE ABSCESS OF INGUINAL REGION: Status: RESOLVED | Noted: 2020-12-05 | Resolved: 2021-07-13

## 2021-07-13 PROBLEM — L02.91 ABSCESS: Status: RESOLVED | Noted: 2020-12-05 | Resolved: 2021-07-13

## 2021-07-13 PROCEDURE — G8754 DIAS BP LESS 90: HCPCS | Performed by: SURGERY

## 2021-07-13 PROCEDURE — G8752 SYS BP LESS 140: HCPCS | Performed by: SURGERY

## 2021-07-13 PROCEDURE — 1090F PRES/ABSN URINE INCON ASSESS: CPT | Performed by: SURGERY

## 2021-07-13 PROCEDURE — 99203 OFFICE O/P NEW LOW 30 MIN: CPT | Performed by: SURGERY

## 2021-07-13 RX ORDER — DOXYCYCLINE 100 MG/1
100 TABLET ORAL 2 TIMES DAILY
Qty: 40 TABLET | Refills: 1 | Status: SHIPPED | OUTPATIENT
Start: 2021-07-13 | End: 2021-08-02

## 2021-07-13 NOTE — PROGRESS NOTES
1. Have you been to the ER, urgent care clinic since your last visit? Hospitalized since your last visit? No    2. Have you seen or consulted any other health care providers outside of the [address] [place] [address] since your last visit? Include any pap smears or colon screening. Yes-  PCP on 07/12/2021       Pt reports that it was lancets at her PCP yesterday and she was started on antibiotics.

## 2021-07-13 NOTE — PROGRESS NOTES
Surgery History and Physical    Subjective:      Charbel Espinosa is a 72 y.o. black female who presents for evaluation of hidradenitis of the right axilla. For the past 8 months, Mrs. Donta Wilson has had sores develop intermittently in her right axilla. Recently, the sores have become larger and more painful, developing into an abscess. She has been off and on antibiotics, and required an I&D by her PCP yesterday. She denies any fever. She has not had any involvement of her left axilla, but has had similar issues in the right side of her groin/abdominal wall and scalp behind her ear. She is using deodorant, but not shaving. Her blood sugars have not been well controlled. She has received steroid injections in her back, but has not been on systemic steroids. She has no prior h/o hidradenitis. Past Medical History:   Diagnosis Date    Diabetes ([facility] [address]     Genital herpes simplex type 2     GERD (gastroesophageal reflux disease)     no medication prescribed    Hypercholesterolemia     Hypertension     Narcolepsy     Neuropathy in diabetes ([facility]     Obesity (BMI 30.0-34. 9)      Past Surgical History:   Procedure Laterality Date    COLONOSCOPY N/A 2019    COLONOSCOPY performed by Latisha Ruffin MD at [facility]  03/15/1979    HX  SECTION  1990    HX KNEE REPLACEMENT Bilateral     HX LIPOMA RESECTION Right 2019    Right buttock.     HX MOHS PROCEDURES Right     HX ROTATOR CUFF REPAIR Right     ME EXTRAC ERUPTED TOOTH/EXPOSED ROOT Right 14    3 UPPER RIGHT SIDE BACK TEETH      Family History   Problem Relation Age of Onset    Diabetes Mother     Heart Disease Mother     Hypertension Mother    Vergara Arthritis-rheumatoid Mother     Diabetes Father     Heart Disease Father     Cancer Maternal Aunt         bone    Diabetes Sister      Social History     Tobacco Use    Smoking status: Never Smoker    Smokeless tobacco: Never Used Substance Use Topics    Alcohol use: Yes     Alcohol/week: 0.0 - 1.0 standard drinks      Prior to Admission medications    Medication Sig Start Date End Date Taking? Authorizing Provider   doxycycline (VIBRAMYCIN) 100 mg capsule Take 1 Capsule by mouth two (2) times a day for 10 days. 7/8/21 7/18/21 Yes Boaz Wing MD   dulaglutide (Trulicity) 1.5 KE/8.1 mL sub-q pen 0.5 mL by SubCUTAneous route every seven (7) days. 6/26/21  Yes Angelita Teresa MD   valACYclovir (VALTREX) 1 gram tablet Take 1 Tab by mouth two (2) times a day. 5/12/21  Yes Boaz Wing MD   nystatin (MYCOSTATIN) topical cream Apply  to affected area two (2) times a day. 4/26/21  Yes Boaz Wing MD   metFORMIN (GLUCOPHAGE) 1,000 mg tablet Take 1 Tab by mouth two (2) times daily (with meals). 4/2/21  Yes Angelita Teresa MD   glipiZIDE (GLUCOTROL) 10 mg tablet TAKE 1 TABLET BY MOUTH TWICE DAILY 4/2/21  Yes Angelita Teresa MD   amLODIPine-benazepril (LOTREL) 10-20 mg per capsule Take 1 Cap by mouth daily. 2/8/21  Yes Boaz Wing MD   hydroCHLOROthiazide (HYDRODIURIL) 25 mg tablet TAKE 1 TABLET BY MOUTH DAILY 2/8/21  Yes Boaz Wing MD   rosuvastatin (CRESTOR) 10 mg tablet TAKE 1 TABLET BY MOUTH DAILY 2/8/21  Yes Boaz Wing MD   traMADoL (ULTRAM) 50 mg tablet Take 50 mg by mouth two (2) times a day. Yes Provider, Historical   pregabalin (LYRICA) 100 mg capsule Take 100 mg by mouth three (3) times daily. 4/22/20  Yes Provider, Historical   DULoxetine (CYMBALTA) 30 mg capsule TK ONE C PO  Q 12 H 12/18/19  Yes Provider, Historical   ammonium lactate (LAC-HYDRIN) 12 % lotion JEMAL EXT TO ALL SURFACES OF BOTH FEET BID 2/14/20  Yes Provider, Historical   cyanocobalamin (VITAMIN B12) 100 mcg tablet Take 1 Tab by mouth daily. 2/24/20  Yes Boaz Wing MD   naloxone [facility]) 2 mg/actuation spry Use 1 spray intranasally, then discard.  Repeat with new spray every 2 min as needed for opioid overdose symptoms, alternating nostrils. 2/24/20  Yes Radha Gutierrez MD   Blood-Glucose Meter (ACCU-CHEK GEORGINA PLUS METER) misc Test once daily Dx Code: E11.65 11/13/19  Yes Zarina Villegas MD   glucose blood VI test strips (ACCU-CHEK GEORGINA PLUS TEST STRP) strip Test once daily Dx Code: E11.65 11/13/19  Yes Zarina Villegas MD   lancets (ACCU-CHEK FASTCLIX LANCET DRUM) misc Test once daily Dx Code: E11.65 11/13/19  Yes Zarina Villegas MD   peg 400-propylene glycol (SYSTANE, PROPYLENE GLYCOL,) 0.4-0.3 % drop Administer 1 Drop to both eyes as needed. Yes Provider, Historical   omega-3 fatty acids-vitamin e (FISH OIL) 1,000 mg Cap Take 1 Cap by mouth daily. Yes Provider, Historical   varicella-zoster recombinant, PF, (Shingrix, PF,) 50 mcg/0.5 mL susr injection 0.5mL by IntraMUSCular route once now and then repeat in 2-6 months  Patient not taking: Reported on 5/20/2021 5/12/21   Radha Gutierrez MD      Allergies   Allergen Reactions    Aspirin Hives     Tolerates ibuprofen and naproxen    Clindamycin Rash and Hives       Review of Systems:  A complete review of systems was performed. Objective:      Physical Exam:  GENERAL: alert, cooperative, no distress, appears stated age, EYE: negative findings: anicteric sclera, LYMPHATIC: Cervical, supraclavicular, and axillary nodes normal. , THROAT & NECK: normal, LUNG: clear to auscultation bilaterally, HEART: regular rate and rhythm, EXTREMITY:no edema, SKIN: In the left axilla, the skin is normal.  In the right axilla, there are approximately 5 to 6 subcutaneous nodules with one open sore. There is minimal tenderness, but no fluctuance, drainage, or erythema., NEUROLOGIC: negative, PSYCHIATRIC: non focal    Assessment:     Hidradenitis of the right axilla. Plan:     Mrs. Shaun Islas does not require further drainage today. She should probably stay on her antibiotics for a longer course.   I have renewed her Doxycycline 100 mg BID for 20 days with one refill to complete a 30 day course. I have recommended that she speak with her PCP to assist with better blood sugar control and weight loss. She will need to either use no deodorant or a hypoallergenic deodorant. I have also recommended adding a Zinc supplement to her diet daily. She has mild disease at this time so surgery is not warranted, only local therapy. She can f/u with me prn.

## 2021-07-14 NOTE — PROGRESS NOTES
Subjective:   Crystal Parks is a 72 y.o. female who presents for evaluation of the draining lesion of the right axilla. 5 days ago started to have significant pain in the right axilla and found to have \"painful boil\". 3 days ago she was placed on Doxycycline. She reports significant improvement of the symptoms and the boil started to drain yesterday. She denies fevers or chills. She states that this has been recurrent issue for the last 7-8 months and recently the sores became more painful and appear bigger in size. She is not shaving. She also developed small painful abscess behind the right ear. Allergies - reviewed: Allergies   Allergen Reactions    Aspirin Hives     Tolerates ibuprofen and naproxen    Clindamycin Rash and Hives         Medications - reviewed:  Current Outpatient Medications   Medication Sig    doxycycline (VIBRAMYCIN) 100 mg capsule Take 1 Capsule by mouth two (2) times a day for 10 days.  dulaglutide (Trulicity) 1.5 FM/9.2 mL sub-q pen 0.5 mL by SubCUTAneous route every seven (7) days.  nystatin (MYCOSTATIN) topical cream Apply  to affected area two (2) times a day.  metFORMIN (GLUCOPHAGE) 1,000 mg tablet Take 1 Tab by mouth two (2) times daily (with meals).  glipiZIDE (GLUCOTROL) 10 mg tablet TAKE 1 TABLET BY MOUTH TWICE DAILY    amLODIPine-benazepril (LOTREL) 10-20 mg per capsule Take 1 Cap by mouth daily.  hydroCHLOROthiazide (HYDRODIURIL) 25 mg tablet TAKE 1 TABLET BY MOUTH DAILY    rosuvastatin (CRESTOR) 10 mg tablet TAKE 1 TABLET BY MOUTH DAILY    traMADoL (ULTRAM) 50 mg tablet Take 50 mg by mouth two (2) times a day.  pregabalin (LYRICA) 100 mg capsule Take 100 mg by mouth three (3) times daily.     DULoxetine (CYMBALTA) 30 mg capsule TK ONE C PO  Q 12 H    ammonium lactate (LAC-HYDRIN) 12 % lotion JEMAL EXT TO ALL SURFACES OF BOTH FEET BID    Blood-Glucose Meter (ACCU-CHEK GEORGINA PLUS METER) misc Test once daily Dx Code: E11.65    glucose blood VI test strips (ACCU-CHEK GEORGINA PLUS TEST STRP) strip Test once daily Dx Code: E11.65    lancets (ACCU-CHEK FASTCLIX LANCET DRUM) misc Test once daily Dx Code: E11.65    peg 400-propylene glycol (SYSTANE, PROPYLENE GLYCOL,) 0.4-0.3 % drop Administer 1 Drop to both eyes as needed.  omega-3 fatty acids-vitamin e (FISH OIL) 1,000 mg Cap Take 1 Cap by mouth daily.  doxycycline (ADOXA) 100 mg tablet Take 1 Tablet by mouth two (2) times a day for 20 days.  varicella-zoster recombinant, PF, (Shingrix, PF,) 50 mcg/0.5 mL susr injection 0.5mL by IntraMUSCular route once now and then repeat in 2-6 months (Patient not taking: Reported on 2021)    valACYclovir (VALTREX) 1 gram tablet Take 1 Tab by mouth two (2) times a day.  cyanocobalamin (VITAMIN B12) 100 mcg tablet Take 1 Tab by mouth daily.  naloxone (NARCAN) 2 mg/actuation spry Use 1 spray intranasally, then discard. Repeat with new spray every 2 min as needed for opioid overdose symptoms, alternating nostrils. No current facility-administered medications for this visit. Past Medical History - reviewed:  Past Medical History:   Diagnosis Date    Diabetes ([facility]     Genital herpes simplex type 2     GERD (gastroesophageal reflux disease)     no medication prescribed    Hypercholesterolemia     Hypertension     Narcolepsy     Neuropathy in diabetes ([facility]     Obesity (BMI 30.0-34. 9)          Past Surgical History - reviewed:  Past Surgical History:   Procedure Laterality Date    COLONOSCOPY N/A 2019    COLONOSCOPY performed by Yue Houston MD at [address] [place] [address] 34  03/15/1979    HX  SECTION  1990    HX KNEE REPLACEMENT Bilateral     HX LIPOMA RESECTION Right 2019    Right buttock.     HX MOHS PROCEDURES Right     HX ROTATOR CUFF REPAIR Right     KY EXTRAC ERUPTED TOOTH/EXPOSED ROOT Right 14    3 UPPER RIGHT SIDE BACK TEETH         Family History - reviewed:  Family History   Problem Relation Age of Onset    Diabetes Mother     Heart Disease Mother     Hypertension Mother    [facility] Arthritis-rheumatoid Mother     Diabetes Father     Heart Disease Father     Cancer Maternal Aunt         bone    Diabetes Sister          Social History - reviewed:  Social History     Socioeconomic History    Marital status:      Spouse name: Not on file    Number of children: Not on file    Years of education: Not on file    Highest education level: Not on file   Occupational History    Not on file   Tobacco Use    Smoking status: Never Smoker    Smokeless tobacco: Never Used   Vaping Use    Vaping Use: Never used   Substance and Sexual Activity    Alcohol use: Yes     Alcohol/week: 0.0 - 1.0 standard drinks    Drug use: No    Sexual activity: Yes     Partners: Male     Birth control/protection: None   Other Topics Concern    Not on file   Social History Narrative    Not on file     Social Determinants of Health     Financial Resource Strain:     Difficulty of Paying Living Expenses:    Food Insecurity:     Worried About Running Out of Food in the Last Year:     [address] in the Last Year:    Transportation Needs:     Lack of Transportation (Medical):  Lack of Transportation (Non-Medical):    Physical Activity:     Days of Exercise per Week:     Minutes of Exercise per Session:    Stress:     Feeling of Stress :    Social Connections:     Frequency of Communication with Friends and Family:     Frequency of Social Gatherings with Friends and Family:     Attends Restorationist Services:     Active Member of Clubs or Organizations:     Attends Club or Organization Meetings:     Marital Status:    Intimate Partner Violence:     Fear of Current or Ex-Partner:     Emotionally Abused:     Physically Abused:     Sexually Abused:          Review of Systems   CONSTITUTIONAL: denies fever. Denies chills.    SKIN: +lesions under the skin Objective:     Visit Vitals  /69 (BP 1 Location: Right upper arm, BP Patient Position: Sitting)   Pulse 77   Temp 98.4 °F (36.9 °C) (Oral)   Resp 16   Ht 5' 3\" (1.6 m)   Wt 202 lb 6.4 oz (91.8 kg)   LMP 01/01/2008 (LMP Unknown)   SpO2 97%   BMI 35.85 kg/m²       General appearance - alert, well appearing, and in no distress  SKIN: In the left axilla, the skin is normal.  In the right axilla, there are approximately 5 to 6 subcutaneous nodules in different sizes, one of the sores very painful and warm to touch with noticeable small amount of purulent drainage and underlying fluctuance. There is a small (approx 0.5 cmX0.5cm) boil behind the right ear. Assessment:   Naima Gabriel is a 72 y.o. female who was seen today for:    ICD-10-CM ICD-9-CM    1. Hydradenitis  L73.2 705.83 REFERRAL TO SURGERY      CULTURE, WOUND W GRAM STAIN      CULTURE, BODY FLUID W GRAM STAIN      CULTURE, BODY FLUID W GRAM STAIN      DRAIN SKIN ABSCESS SIMPLE     Clinical presentation is c/w hydradenitis suppurativa, recurrent. There is one drain able abscess in the right axilla and behind the right ear. Will I&D. Please see the procedure note below. Continue Doxycline until finished. Will refer to gen surgery for evaluation given the re currency.        [facility]  OFFICE PROCEDURE PROGRESS NOTE        Chart reviewed for the following:   Krystal Hartman MD, have reviewed the History, Physical and updated the Allergic reactions for Tryphena MACIEJ Mariscal     TIME OUT performed immediately prior to start of procedure:   Krystal Hartman MD, have performed the following reviews on Naima Gabriel prior to the start of the procedure:            * Patient was identified by name and date of birth   * Agreement on procedure being performed was verified  * Risks and Benefits explained to the patient  * Procedure site verified and marked as necessary  * Patient was positioned for comfort  * Consent was signed and verified     Time: 3.45 pm       Date of procedure: 7/12/2021    Procedure performed by:  Dionne Kurtz MD    Provider assisted by: Rg Nguyen LPN    Patient assisted by: self    How tolerated by patient: tolerated the procedure well with no complications    Post Procedural Pain Scale: 2 - Hurts Little Bit    Comments: none          Procedure Note:   After informed consent was obtained, using chlorhexidine for cleansing and 1% lidocaine with epinephrine for anesthetic, with sterile technique, incision and drainage of abscess was performed under the right arm and on the back on the neck behind the right ear. Packing material was placed in the wound under the right arm. Antibiotic dressing is applied, and wound care instructions provided. Be alert for any signs of cutaneous infection. The procedure was well tolerated without complications. I have discussed the diagnosis with the patient and the intended plan as seen in the above orders. The patient has received an after-visit summary and questions were answered concerning future plans. I have discussed medication side effects and warnings with the patient as well. Informed pt to return to the office if new symptoms arise.       Dionne Kurtz MD  Family Medicine Physician

## 2021-07-15 LAB
MICROORGANISM/AGENT SPEC: ABNORMAL
SPECIMEN STATUS REPORT, ROLRST: NORMAL

## 2021-08-10 ENCOUNTER — HOSPITAL ENCOUNTER (OUTPATIENT)
Dept: NUTRITION | Age: 66
Discharge: HOME | End: 2021-08-10
Payer: MEDICARE

## 2021-08-10 DIAGNOSIS — R63.5 WEIGHT GAIN: ICD-10-CM

## 2021-08-10 DIAGNOSIS — Z71.3 DIETARY COUNSELING AND SURVEILLANCE: ICD-10-CM

## 2021-08-10 DIAGNOSIS — E66.9 OBESITY (BMI 30.0-34.9): ICD-10-CM

## 2021-08-10 PROCEDURE — 97802 MEDICAL NUTRITION INDIV IN: CPT | Performed by: DIETITIAN, REGISTERED

## 2021-08-10 NOTE — PROGRESS NOTES
Reba Dunn was informed of the inherent limitations of a virtual visit,  and has consented to a virtual therapy visit on 8/10/2021. The patient was informed that at any time during the virtual visit, they can decide to stop the virtual visit. The patient verified that they are physically located in the [facility] for this virtual visit. [address] [facility]     Nutrition Assessment - Medical Nutrition Therapy   Outpatient Initial Evaluation         Patient Name: Reba Dunn : 1955   Treatment Diagnosis: R63.5 (ICD-10-CM) - Weight gain   Z71.3 (ICD-10-CM) - Dietary counseling and surveillance   E66.01 (ICD-10-CM) - Morbid (severe) obesity due to excess calories      Referral Source: Rob Allred, * Start of [facility]): 8/10/2021     In time:   200pm             Out time:   300pm   Total Treatment Time (min):   60 min     Gender: female Age: 72 y.o. Ht: 63 in Wt:  203.4 lb 92.3 kg   BMI: 36.05 (class II obesity)  BMR   Male  BMR Female 1437     Past Medical History:  Patient Active Problem List   Diagnosis Code    Hypertension I10    Diabetes ([address] E11.9    Hypercholesterolemia E78.00    Neuropathy due to secondary diabetes ([facility] E13.40    Lumbar stenosis M48.061    Lumbar herniated disc M51.26    Postcoital UTI N39.0    Type 2 diabetes mellitus with diabetic nephropathy, without long-term current use of insulin (HCC) E11.21    Type 2 diabetes mellitus with hyperglycemia, without long-term current use of insulin (HCC) E11.65    Closed nondisplaced fracture of first cervical vertebra (HCC) S12.001A    Closed fracture of multiple ribs of right side with routine healing S22.41XD    Type 2 diabetes mellitus with diabetic neuropathy (HCC) E11.40    HSV-2 infection B00.9    Breast cancer screening Z12.39    Cervical cancer screening Z12.4    Obesity (BMI 30.0-34. 9) E66.9    Shingles rash B02.9    S/P excision of lipoma Z98.890, Z86.018    Severe obesity ([facility] [address] E66.01    Infection of skin due to methicillin resistant Staphylococcus aureus (MRSA) A49.02    S/P colonoscopy Z98.890    Right axillary hidradenitis L73.2        Pertinent Medications:     Current Outpatient Medications:     dulaglutide (Trulicity) 1.5 HX/5.9 mL sub-q pen, 0.5 mL by SubCUTAneous route every seven (7) days. , Disp: 6 mL, Rfl: 4    varicella-zoster recombinant, PF, (Shingrix, PF,) 50 mcg/0.5 mL susr injection, 0.5mL by IntraMUSCular route once now and then repeat in 2-6 months (Patient not taking: Reported on 5/20/2021), Disp: 0.5 mL, Rfl: 1    valACYclovir (VALTREX) 1 gram tablet, Take 1 Tab by mouth two (2) times a day., Disp: 20 Tab, Rfl: 5    nystatin (MYCOSTATIN) topical cream, Apply  to affected area two (2) times a day., Disp: 60 g, Rfl: 1    metFORMIN (GLUCOPHAGE) 1,000 mg tablet, Take 1 Tab by mouth two (2) times daily (with meals). , Disp: 180 Tab, Rfl: 3    glipiZIDE (GLUCOTROL) 10 mg tablet, TAKE 1 TABLET BY MOUTH TWICE DAILY, Disp: 180 Tab, Rfl: 3    amLODIPine-benazepril (LOTREL) 10-20 mg per capsule, Take 1 Cap by mouth daily. , Disp: 90 Cap, Rfl: 3    hydroCHLOROthiazide (HYDRODIURIL) 25 mg tablet, TAKE 1 TABLET BY MOUTH DAILY, Disp: 90 Tab, Rfl: 3    rosuvastatin (CRESTOR) 10 mg tablet, TAKE 1 TABLET BY MOUTH DAILY, Disp: 90 Tab, Rfl: 3    traMADoL (ULTRAM) 50 mg tablet, Take 50 mg by mouth two (2) times a day., Disp: , Rfl:     pregabalin (LYRICA) 100 mg capsule, Take 100 mg by mouth three (3) times daily. , Disp: , Rfl:     DULoxetine (CYMBALTA) 30 mg capsule, TK ONE C PO  Q 12 H, Disp: , Rfl:     ammonium lactate (LAC-HYDRIN) 12 % lotion, JEMAL EXT TO ALL SURFACES OF BOTH FEET BID, Disp: , Rfl:     cyanocobalamin (VITAMIN B12) 100 mcg tablet, Take 1 Tab by mouth daily. , Disp: 90 Tab, Rfl: 3    naloxone (NARCAN) 2 mg/actuation spry, Use 1 spray intranasally, then discard.  Repeat with new spray every 2 min as needed for opioid overdose symptoms, alternating nostrils. , Disp: 1 Vial, Rfl: 1    Blood-Glucose Meter (ACCU-CHEK GEORGINA PLUS METER) misc, Test once daily Dx Code: E11.65, Disp: 1 Each, Rfl: 0    glucose blood VI test strips (ACCU-CHEK GEORGINA PLUS TEST STRP) strip, Test once daily Dx Code: E11.65, Disp: 100 Strip, Rfl: 3    lancets (ACCU-CHEK FASTCLIX LANCET DRUM) misc, Test once daily Dx Code: E11.65, Disp: 100 Each, Rfl: 3    peg 400-propylene glycol (SYSTANE, PROPYLENE GLYCOL,) 0.4-0.3 % drop, Administer 1 Drop to both eyes as needed. , Disp: , Rfl:     omega-3 fatty acids-vitamin e (FISH OIL) 1,000 mg Cap, Take 1 Cap by mouth daily. , Disp: , Rfl:      Biochemical Data:   Lab Results   Component Value Date/Time    Hemoglobin A1c 6.9 (H) 08/20/2020 09:32 AM    Hemoglobin A1c (POC) 7.6 11/13/2019 10:45 AM     Lab Results   Component Value Date/Time    Sodium 144 02/10/2021 09:05 AM    Potassium 3.9 02/10/2021 09:05 AM    Chloride 108 02/10/2021 09:05 AM    CO2 26 02/10/2021 09:05 AM    Anion gap 10 02/10/2021 09:05 AM    Glucose 84 02/10/2021 09:05 AM    BUN 22 (H) 02/10/2021 09:05 AM    Creatinine 1.43 (H) 02/10/2021 09:05 AM    BUN/Creatinine ratio 15 02/10/2021 09:05 AM    GFR est AA 45 (L) 02/10/2021 09:05 AM    GFR est non-AA 37 (L) 02/10/2021 09:05 AM    Calcium 9.4 02/10/2021 09:05 AM    Bilirubin, total 0.2 02/10/2021 09:05 AM    Alk.  phosphatase 103 02/10/2021 09:05 AM    Protein, total 7.1 02/10/2021 09:05 AM    Albumin 3.8 02/10/2021 09:05 AM    Globulin 3.3 02/10/2021 09:05 AM    A-G Ratio 1.2 02/10/2021 09:05 AM    ALT (SGPT) 28 02/10/2021 09:05 AM    AST (SGOT) 18 02/10/2021 09:05 AM     Lab Results   Component Value Date/Time    Cholesterol, total 126 02/10/2021 09:05 AM    HDL Cholesterol 50 02/10/2021 09:05 AM    LDL,Direct 59 08/20/2020 09:32 AM    LDL, calculated 53.2 02/10/2021 09:05 AM    VLDL, calculated 22.8 02/10/2021 09:05 AM    Triglyceride 114 02/10/2021 09:05 AM    CHOL/HDL Ratio 2.5 02/10/2021 09:05 AM        Assessment:    Pt is a 71 yo female seen today for weight loss and diabetes. Lab results show HbA1c  6.9 (diabetes). Pt notes that she has gained about 20# in the past year and she is frustrated with herself for this weight gain. Pt stated that she dislikes the way her arms and abdomen look and she has been working on increasing physical activity to aid in this but has not seen any success in terms of weight loss. Over the past 2 months pt has been working out for 60 mi 2x per week at [employer]. 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 185# lowest weight in past 2-3 years. Pt reports not checking blood sugar levels d/t not knowing how to use it. If testing in morning was seeing 140 mg/dl. Food & Nutrition: B- Coffee or Milk, Bagel or English Muffin   S- peach or watermelon   L-   S- Celery, broccoli, tomatoes w/ ranch or onion dip   D- Eating w/ patient at work; Magdy beef, mashed potatoes, string beans; Stew  S-   Drinks: Coffee, [employer] cranberry juice 5-6x 12 oz glasses (900 calories), dislikes water (taste), gallon of milk in 2 days     No chicken all other protein is good. Loves potatoes; french fries    Try diluting cranberry juice with water 1/2 each in glass     RD recommendations:   Use fist for portions of starches and fruit   Add protein to breakfast; 2 boiled eggs, nuts   8oz glass of 2% milk per day reducing from 12oz  Set up non-food reward down 5lbs = massage        Estimate Needs = Equation( [x] MSJ ; []  HBE; [] Jones; [] other)  * Activity Factor (1.4) -500   Calories: 1500 Protein: 75 Carbs: 188 Fat: 50   Kcal/day  g/day  g/day  g/day       Protein 0.8g/kg= 74 g percent: 20  50  30               Nutrition Diagnosis Food and nutrition related knowledge deficit R/T lack of prior education for diabetes and nutrition AEB pt questions during session and HbA1c 6.9 (high).           Nutrition Intervention &  Education: Educated pt on the pathophysiology of Type II Diabetes, insulin resistance and the rationale for dietary modifications and increased activity. Educated pt on lean proteins, healthy fats, non-starchy vegetables, and complex carbohydrate food sources. Discussed limiting carbohydrates, label reading, meal timing, and appropriate serving sizes. Encouraged pt to avoid sugary beverages. Reviewed meal builder tool, calorie and macro breakdown as well as exercise parameters. Discussed having combination foods to decrease impact of CHO on blood sugars. Educated pt on empty calories from her drinks of choice, reviewing calories and carbohydrates in current intake. Offered suggestions for reducing. Handouts Provided: []  Carbohydrates  []  Protein  []  Non-starchy Vegatbles  []  Food Label  []  Meal and Snack Ideas  []  Food Journals []  Diabetes  []  Cholesterol  []  Sodium  []  Gen Nutr Guidelines  []  SBGM Guidelines  []  Others:   Information Reviewed with: Pt    Readiness to Change Stage: []  Pre-contemplative    [x]  Contemplative  []  Preparation               []  Action                  []  Maintenance   Potential Barriers to Learning:                      []  Decline in memory    []  Language barrier   []  Other:  []  Emotional                  []  Limited mobility     [x]  None  []  Lack of motivation     [] Vision, hearing or cognitive impairment   Expected Compliance: Pt expressed comprehension, high motivation, and compliance is expected.         Nutritional Goal - To promote lifestyle changes to result in:    [x]  Weight loss  [x]  Improved diabetic control  []  Decreased cholesterol levels  []  Decreased blood pressure  []  Weight maintenance []  Preventing any interactions associated with food allergies  []  Adequate weight gain toward goal weight  []  Other:        Patient Goals:   - Use fist for portions of starches and fruit, having one portion per meal.    - Add protein source to breakfast and snacks; 2 boiled eggs, nuts   - Reduce to 8oz glass of 2% milk per day  - For juice do 1/2 glass juice and 1/2 glass of water. Dietitian Signature: Noé Camp RDN Date: 8/10/2021   Follow-up: 2-4 weeks  Time: 1:24 PM   Shashi Dickerson is a 72 y.o. female being evaluated by a Virtual Visit (video visit) encounter to address concerns as mentioned above. A caregiver was present when appropriate. Due to this being a TeleHealth encounter (During [facility] public health emergency), evaluation of the following areas was limited: Nutrition Focused Physical Exam. Pursuant to the emergency declaration under the [address] [place] [address], [address] [place] [address] authority and the [employer] [facility] and [employer] Act, this Virtual Visit was conducted with patient's (and/or legal guardian's) consent, to reduce the risk of exposure to COVID-19 and provide necessary medical care. Services were provided through a video synchronous discussion virtually to substitute for in-person encounter. --Noé Camp RD on 8/10/2021 at 1:24 PM    An electronic signature was used to authenticate this note.

## 2021-09-09 ENCOUNTER — OFFICE VISIT (OUTPATIENT)
Dept: ENDOCRINOLOGY | Age: 66
End: 2021-09-09
Payer: MEDICARE

## 2021-09-09 VITALS
SYSTOLIC BLOOD PRESSURE: 145 MMHG | BODY MASS INDEX: 36.34 KG/M2 | RESPIRATION RATE: 18 BRPM | DIASTOLIC BLOOD PRESSURE: 77 MMHG | OXYGEN SATURATION: 96 % | WEIGHT: 205.1 LBS | HEIGHT: 63 IN | HEART RATE: 95 BPM | TEMPERATURE: 97.4 F

## 2021-09-09 DIAGNOSIS — E11.65 TYPE 2 DIABETES MELLITUS WITH HYPERGLYCEMIA, UNSPECIFIED WHETHER LONG TERM INSULIN USE (HCC): ICD-10-CM

## 2021-09-09 DIAGNOSIS — E11.65 TYPE 2 DIABETES MELLITUS WITH HYPERGLYCEMIA, WITHOUT LONG-TERM CURRENT USE OF INSULIN (HCC): Primary | ICD-10-CM

## 2021-09-09 DIAGNOSIS — I10 ESSENTIAL HYPERTENSION: ICD-10-CM

## 2021-09-09 DIAGNOSIS — E11.65 TYPE 2 DIABETES MELLITUS WITH HYPERGLYCEMIA, WITHOUT LONG-TERM CURRENT USE OF INSULIN (HCC): ICD-10-CM

## 2021-09-09 DIAGNOSIS — E78.00 HYPERCHOLESTEROLEMIA: ICD-10-CM

## 2021-09-09 LAB
BUN SERPL-MCNC: 18 MG/DL (ref 8–27)
BUN/CREAT SERPL: 14 (ref 12–28)
CALCIUM SERPL-MCNC: 9.1 MG/DL (ref 8.7–10.3)
CHLORIDE SERPL-SCNC: 105 MMOL/L (ref 96–106)
CO2 SERPL-SCNC: 29 MMOL/L (ref 20–29)
CREAT SERPL-MCNC: 1.32 MG/DL (ref 0.57–1)
EST. AVERAGE GLUCOSE BLD GHB EST-MCNC: 151 MG/DL
GLUCOSE SERPL-MCNC: 195 MG/DL (ref 65–99)
HBA1C MFR BLD: 6.9 % (ref 4.8–5.6)
INTERPRETATION: NORMAL
POTASSIUM SERPL-SCNC: 4.2 MMOL/L (ref 3.5–5.2)
SODIUM SERPL-SCNC: 145 MMOL/L (ref 134–144)

## 2021-09-09 PROCEDURE — G8510 SCR DEP NEG, NO PLAN REQD: HCPCS | Performed by: INTERNAL MEDICINE

## 2021-09-09 PROCEDURE — G8536 NO DOC ELDER MAL SCRN: HCPCS | Performed by: INTERNAL MEDICINE

## 2021-09-09 PROCEDURE — 99214 OFFICE O/P EST MOD 30 MIN: CPT | Performed by: INTERNAL MEDICINE

## 2021-09-09 PROCEDURE — G8427 DOCREV CUR MEDS BY ELIG CLIN: HCPCS | Performed by: INTERNAL MEDICINE

## 2021-09-09 PROCEDURE — 3044F HG A1C LEVEL LT 7.0%: CPT | Performed by: INTERNAL MEDICINE

## 2021-09-09 PROCEDURE — G8417 CALC BMI ABV UP PARAM F/U: HCPCS | Performed by: INTERNAL MEDICINE

## 2021-09-09 PROCEDURE — 1101F PT FALLS ASSESS-DOCD LE1/YR: CPT | Performed by: INTERNAL MEDICINE

## 2021-09-09 PROCEDURE — G8754 DIAS BP LESS 90: HCPCS | Performed by: INTERNAL MEDICINE

## 2021-09-09 PROCEDURE — 3017F COLORECTAL CA SCREEN DOC REV: CPT | Performed by: INTERNAL MEDICINE

## 2021-09-09 PROCEDURE — G9899 SCRN MAM PERF RSLTS DOC: HCPCS | Performed by: INTERNAL MEDICINE

## 2021-09-09 PROCEDURE — 1090F PRES/ABSN URINE INCON ASSESS: CPT | Performed by: INTERNAL MEDICINE

## 2021-09-09 PROCEDURE — G8753 SYS BP > OR = 140: HCPCS | Performed by: INTERNAL MEDICINE

## 2021-09-09 PROCEDURE — 2022F DILAT RTA XM EVC RTNOPTHY: CPT | Performed by: INTERNAL MEDICINE

## 2021-09-09 PROCEDURE — G8399 PT W/DXA RESULTS DOCUMENT: HCPCS | Performed by: INTERNAL MEDICINE

## 2021-09-09 RX ORDER — DULAGLUTIDE 3 MG/.5ML
3 INJECTION, SOLUTION SUBCUTANEOUS
Qty: 12 EACH | Refills: 3 | Status: SHIPPED | OUTPATIENT
Start: 2021-09-09 | End: 2022-01-19 | Stop reason: SDUPTHER

## 2021-09-09 RX ORDER — BLOOD SUGAR DIAGNOSTIC
STRIP MISCELLANEOUS
Qty: 100 STRIP | Refills: 3 | Status: SHIPPED | OUTPATIENT
Start: 2021-09-09 | End: 2022-01-19 | Stop reason: SDUPTHER

## 2021-09-09 RX ORDER — GLIPIZIDE 10 MG/1
TABLET ORAL
Qty: 180 TABLET | Refills: 3 | Status: SHIPPED | OUTPATIENT
Start: 2021-09-09 | End: 2022-01-19 | Stop reason: SDUPTHER

## 2021-09-09 RX ORDER — LANCETS
EACH MISCELLANEOUS
Qty: 100 EACH | Refills: 3 | Status: SHIPPED | OUTPATIENT
Start: 2021-09-09 | End: 2022-01-19 | Stop reason: SDUPTHER

## 2021-09-09 RX ORDER — METFORMIN HYDROCHLORIDE 1000 MG/1
1000 TABLET ORAL 2 TIMES DAILY WITH MEALS
Qty: 180 TABLET | Refills: 3 | Status: SHIPPED | OUTPATIENT
Start: 2021-09-09 | End: 2022-01-19 | Stop reason: SDUPTHER

## 2021-09-09 NOTE — PROGRESS NOTES
1. Have you been to the ER, urgent care clinic since your last visit? No Hospitalized since your last visit? No    2. Have you seen or consulted any other health care providers outside of the [address] [place] [address] since your last visit? Include any pap smears or colon screening. No    Wt Readings from Last 3 Encounters:   09/09/21 205 lb 1.6 oz (93 kg)   07/13/21 203 lb 8 oz (92.3 kg)   07/12/21 202 lb 6.4 oz (91.8 kg)     Temp Readings from Last 3 Encounters:   09/09/21 97.4 °F (36.3 °C) (Temporal)   07/13/21 98.8 °F (37.1 °C) (Oral)   07/12/21 98.4 °F (36.9 °C) (Oral)     BP Readings from Last 3 Encounters:   09/09/21 (!) 145/77   07/13/21 114/70   07/12/21 105/69     Pulse Readings from Last 3 Encounters:   09/09/21 95   07/13/21 95   07/12/21 77     Lab Results   Component Value Date/Time    Hemoglobin A1c 6.9 (H) 08/20/2020 09:32 AM    Hemoglobin A1c (POC) 7.6 11/13/2019 10:45 AM     Patient does not have meter today.

## 2021-12-03 ENCOUNTER — OFFICE VISIT (OUTPATIENT)
Dept: FAMILY MEDICINE CLINIC | Age: 66
End: 2021-12-03
Payer: MEDICARE

## 2021-12-03 VITALS
OXYGEN SATURATION: 95 % | HEART RATE: 89 BPM | RESPIRATION RATE: 17 BRPM | TEMPERATURE: 97.5 F | HEIGHT: 63 IN | SYSTOLIC BLOOD PRESSURE: 97 MMHG | BODY MASS INDEX: 35.61 KG/M2 | WEIGHT: 201 LBS | DIASTOLIC BLOOD PRESSURE: 66 MMHG

## 2021-12-03 DIAGNOSIS — L08.9 SUPERFICIAL SKIN INFECTION: Primary | ICD-10-CM

## 2021-12-03 PROCEDURE — 99213 OFFICE O/P EST LOW 20 MIN: CPT | Performed by: STUDENT IN AN ORGANIZED HEALTH CARE EDUCATION/TRAINING PROGRAM

## 2021-12-03 PROCEDURE — G8510 SCR DEP NEG, NO PLAN REQD: HCPCS | Performed by: STUDENT IN AN ORGANIZED HEALTH CARE EDUCATION/TRAINING PROGRAM

## 2021-12-03 PROCEDURE — 3017F COLORECTAL CA SCREEN DOC REV: CPT | Performed by: STUDENT IN AN ORGANIZED HEALTH CARE EDUCATION/TRAINING PROGRAM

## 2021-12-03 PROCEDURE — G8752 SYS BP LESS 140: HCPCS | Performed by: STUDENT IN AN ORGANIZED HEALTH CARE EDUCATION/TRAINING PROGRAM

## 2021-12-03 PROCEDURE — 1101F PT FALLS ASSESS-DOCD LE1/YR: CPT | Performed by: STUDENT IN AN ORGANIZED HEALTH CARE EDUCATION/TRAINING PROGRAM

## 2021-12-03 PROCEDURE — G8754 DIAS BP LESS 90: HCPCS | Performed by: STUDENT IN AN ORGANIZED HEALTH CARE EDUCATION/TRAINING PROGRAM

## 2021-12-03 PROCEDURE — G9899 SCRN MAM PERF RSLTS DOC: HCPCS | Performed by: STUDENT IN AN ORGANIZED HEALTH CARE EDUCATION/TRAINING PROGRAM

## 2021-12-03 PROCEDURE — G8417 CALC BMI ABV UP PARAM F/U: HCPCS | Performed by: STUDENT IN AN ORGANIZED HEALTH CARE EDUCATION/TRAINING PROGRAM

## 2021-12-03 PROCEDURE — G8536 NO DOC ELDER MAL SCRN: HCPCS | Performed by: STUDENT IN AN ORGANIZED HEALTH CARE EDUCATION/TRAINING PROGRAM

## 2021-12-03 PROCEDURE — G8427 DOCREV CUR MEDS BY ELIG CLIN: HCPCS | Performed by: STUDENT IN AN ORGANIZED HEALTH CARE EDUCATION/TRAINING PROGRAM

## 2021-12-03 PROCEDURE — 1090F PRES/ABSN URINE INCON ASSESS: CPT | Performed by: STUDENT IN AN ORGANIZED HEALTH CARE EDUCATION/TRAINING PROGRAM

## 2021-12-03 PROCEDURE — G8399 PT W/DXA RESULTS DOCUMENT: HCPCS | Performed by: STUDENT IN AN ORGANIZED HEALTH CARE EDUCATION/TRAINING PROGRAM

## 2021-12-03 RX ORDER — DOXYCYCLINE 100 MG/1
100 TABLET ORAL 2 TIMES DAILY
Qty: 28 TABLET | Refills: 0 | Status: SHIPPED | OUTPATIENT
Start: 2021-12-03 | End: 2021-12-17

## 2021-12-03 NOTE — PATIENT INSTRUCTIONS
How can you care for yourself at home? · If you were given antibiotics, take them as directed. Do not stop taking them just because you feel better. You need to take the full course of antibiotics. · Take pain medicines exactly as directed. If the doctor gave you a prescription medicine for pain, take it as prescribed. If you are not taking a prescription pain medicine, ask your doctor if you can take an over-the-counter medicine. · Apply a heating pad set on low or a hot water bottle 3 or 4 times a day for pain. Keep a cloth between the heat source and your skin. · Keep your bandage clean and dry. Change the bandage whenever it gets wet or dirty, or at least one time a day. · If the abscess was packed with gauze:  ¨ Keep follow-up appointments to have the gauze changed or removed. If your doctor instructed you to remove the gauze, gently pull out all of the gauze when your doctor tells you to. ¨ After the gauze is removed, soak the area in warm water for 15 to 20 minutes two times a day, until the wound closes. When should you call for help? Call your doctor today if:  · You think the abscess is getting worse. · You have a new or higher fever. · Your pain gets worse. · You have any problems with the gauze in your abscess. Where can you learn more? Go to [employer]  Enter M703 in the search box to learn more about \"Skin Abscess: After Your Visit to the Emergency Room. \"   © 0659-9809 [employer], [employer]orporated. Care instructions adapted under license by [facility] (which disclaims liability or warranty for this information). This care instruction is for use with your licensed healthcare professional. If you have questions about a medical condition or this instruction, always ask your healthcare professional. [place] [address] any warranty or liability for your use of this information. Content Version: 9.4.66834;  Last Revised: September 29, 2010

## 2021-12-03 NOTE — PROGRESS NOTES
Nicky Donald is an 77 y.o. female PMHx of HTN, T2DM, Hydradenitis suppurative, Obesity, who presents for painful boils in genital area. Pt reports that 2 weeks ago she has noticed ~ 4 small \"boils\" in the suprapubic area. Reports that this is the first time, they are painful, and about 2 days ago one of them have mild drainage, It is mildly itchy. She has been putting heat which helps and has been taking Valtrex BID which is not helping. Denies fever, purulent discharge, no other lesion on other parts of body. Allergies - reviewed: Allergies   Allergen Reactions    Aspirin Hives     Tolerates ibuprofen and naproxen    Clindamycin Rash and Hives       Medications - reviewed:   Current Outpatient Medications   Medication Sig    doxycycline (ADOXA) 100 mg tablet Take 1 Tablet by mouth two (2) times a day for 14 days.  glipiZIDE (GLUCOTROL) 10 mg tablet TAKE 1 TABLET BY MOUTH TWICE DAILY    metFORMIN (GLUCOPHAGE) 1,000 mg tablet Take 1 Tablet by mouth two (2) times daily (with meals).  lancets (Accu-Chek Fastclix Lancet Drum) misc Test once daily Dx Code: E11.65    dulaglutide (Trulicity) 3 WV/0.4 mL pnij 3 mg by SubCUTAneous route every seven (7) days. Stop 1.5 mg    valACYclovir (VALTREX) 1 gram tablet Take 1 Tab by mouth two (2) times a day. (Patient taking differently: Take 1,000 mg by mouth as needed.)    nystatin (MYCOSTATIN) topical cream Apply  to affected area two (2) times a day.  amLODIPine-benazepril (LOTREL) 10-20 mg per capsule Take 1 Cap by mouth daily.  hydroCHLOROthiazide (HYDRODIURIL) 25 mg tablet TAKE 1 TABLET BY MOUTH DAILY    rosuvastatin (CRESTOR) 10 mg tablet TAKE 1 TABLET BY MOUTH DAILY    traMADoL (ULTRAM) 50 mg tablet Take 50 mg by mouth three (3) times daily.  pregabalin (LYRICA) 100 mg capsule Take 100 mg by mouth three (3) times daily.     ammonium lactate (LAC-HYDRIN) 12 % lotion JEMAL EXT TO ALL SURFACES OF BOTH FEET BID    Blood-Glucose Meter (ACCU-CHEK GEORGINA PLUS METER) misc Test once daily Dx Code: E11.65    peg 400-propylene glycol (SYSTANE, PROPYLENE GLYCOL,) 0.4-0.3 % drop Administer 1 Drop to both eyes as needed.  omega-3 fatty acids-vitamin e (FISH OIL) 1,000 mg Cap Take 1 Cap by mouth daily.  glucose blood VI test strips (Accu-Chek Georgina Plus test strp) strip Test once daily Dx Code: E11.65    varicella-zoster recombinant, PF, (Shingrix, PF,) 50 mcg/0.5 mL susr injection 0.5mL by IntraMUSCular route once now and then repeat in 2-6 months (Patient not taking: Reported on 2021)    naloxone [facility]) 2 mg/actuation spry Use 1 spray intranasally, then discard. Repeat with new spray every 2 min as needed for opioid overdose symptoms, alternating nostrils. (Patient not taking: Reported on 12/3/2021)     No current facility-administered medications for this visit. Past Medical History - reviewed:  Past Medical History:   Diagnosis Date    Diabetes ([facility]     Genital herpes simplex type 2     GERD (gastroesophageal reflux disease)     no medication prescribed    Hypercholesterolemia     Hypertension     Narcolepsy     Neuropathy in diabetes ([facility]     Obesity (BMI 30.0-34. 9)          Past Surgical History - reviewed:   Past Surgical History:   Procedure Laterality Date    COLONOSCOPY N/A 2019    COLONOSCOPY performed by Ignacio Jeffers MD at [address] [place] [address]  03/15/1979    HX  SECTION  1990    HX KNEE REPLACEMENT Bilateral     HX LIPOMA RESECTION Right 2019    Right buttock.     HX MOHS PROCEDURES Right     HX ROTATOR CUFF REPAIR Right     [place] EXTRAC ERUPTED TOOTH/EXPOSED ROOT Right 14    3 UPPER RIGHT SIDE BACK TEETH         Social History - reviewed:  Social History     Socioeconomic History    Marital status:      Spouse name: Not on file    Number of children: Not on file    Years of education: Not on file    Highest education level: Not on file   Occupational History    Not on file   Tobacco Use    Smoking status: Never Smoker    Smokeless tobacco: Never Used   Vaping Use    Vaping Use: Never used   Substance and Sexual Activity    Alcohol use: Yes     Alcohol/week: 0.0 - 1.0 standard drinks    Drug use: No    Sexual activity: Yes     Partners: Male     Birth control/protection: None   Other Topics Concern    Not on file   Social History Narrative    Not on file     Social Determinants of Health     Financial Resource Strain:     Difficulty of Paying Living Expenses: Not on file   Food Insecurity:     Worried About Running Out of Food in the Last Year: Not on file    Tanya of Food in the Last Year: Not on file   Transportation Needs:     Lack of Transportation (Medical): Not on file    Lack of Transportation (Non-Medical):  Not on file   Physical Activity:     Days of Exercise per Week: Not on file    Minutes of Exercise per Session: Not on file   Stress:     Feeling of Stress : Not on file   Social Connections:     Frequency of Communication with Friends and Family: Not on file    Frequency of Social Gatherings with Friends and Family: Not on file    Attends Taoist Services: Not on file    Active Member of [address] [place] [address] [employer] or Organizations: Not on file    Attends Club or Organization Meetings: Not on file    Marital Status: Not on file   Intimate Partner Violence:     Fear of Current or Ex-Partner: Not on file    Emotionally Abused: Not on file    Physically Abused: Not on file    Sexually Abused: Not on file   Housing Stability:     Unable to Pay for Housing in the Last Year: Not on file    Number of [place] in the Last Year: Not on file    Unstable Housing in the Last Year: Not on file         Family History - reviewed:  Family History   Problem Relation Age of Onset    Diabetes Mother     Heart Disease Mother     Hypertension Mother     Arthritis-rheumatoid Mother     Diabetes Father     Heart Disease Father    [facility] Cancer Maternal Aunt         bone    Diabetes Sister          Immunizations - reviewed:   Immunization History   Administered Date(s) Administered    COVID-19, PFIZER, MRNA, LNP-S, PF, 30MCG/0.3ML DOSE 06/21/2021, 07/15/2021    Pneumococcal Polysaccharide (PPSV-23) 12/13/2016    Tdap 02/02/2018       ROS  Negative beside what is written in HPI. Physical Exam  Visit Vitals  BP 97/66   Pulse 89   Temp 97.5 °F (36.4 °C) (Temporal)   Resp 17   Ht 5' 3\" (1.6 m)   Wt 201 lb (91.2 kg)   LMP 01/01/2008 (LMP Unknown)   SpO2 95%   BMI 35.61 kg/m²       General: No acute distress. Alert. Cooperative. Respiratory: CTAB. No w/r/r/c. Extremities: No LE edema. Distal pulses present. Musculoskeletal: Full ROM in all extremities. Skin: There is 4 small (0.5-1 cm) pustule looking-lesions on suprapubic area. Mild erythema around, no drainage, TTP. No inguinal LAD. No other abnormalities. Neuro: Alert and oriented. Assessment/Plan  Daniel Marciano F 77 y.o. PMHx of HTN, T2DM, Hydradenitis suppurative, Obesity, who presents for painful boils in genital area. ICD-10-CM ICD-9-CM    1. Superficial skin infection  L08.9 686.9 - Doxycycline (ADOXA) 100 mg tablet. Take 1 tablet PO BID for 10 days, if no complete resolution during that time, pt was instructed to take for 14 days. - Tylenol/Motrin for pain. - Zyrtec for itchy, advised to not scratch. Follow-up and Dispositions    · Return if symptoms worsen or fail to improve. I have discussed the diagnosis with the patient and the intended plan as seen in the above orders. Patient verbalized understanding of the plan and agrees with the plan. The patient has received an after-visit summary and questions were answered concerning future plans. I have discussed medication side effects and warnings with the patient as well. Informed patient to return to the office if new symptoms arise. Pt discussed with Dr. Carol Mullins (Attending Physician). Felix Barlow MD  Family Medicine Resident

## 2021-12-03 NOTE — PROGRESS NOTES
Chief Complaint   Patient presents with    Vaginal Pain     boils      1. Have you been to the ER, urgent care clinic since your last visit? Hospitalized since your last visit? No    2. Have you seen or consulted any other health care providers outside of the [address] [place] [address] since your last visit? Include any pap smears or colon screening.  No

## 2021-12-21 ENCOUNTER — HOSPITAL ENCOUNTER (EMERGENCY)
Age: 66
Discharge: HOME | End: 2021-12-21
Attending: EMERGENCY MEDICINE
Payer: MEDICARE

## 2021-12-21 VITALS
BODY MASS INDEX: 35.79 KG/M2 | DIASTOLIC BLOOD PRESSURE: 90 MMHG | HEIGHT: 63 IN | OXYGEN SATURATION: 99 % | RESPIRATION RATE: 16 BRPM | SYSTOLIC BLOOD PRESSURE: 150 MMHG | WEIGHT: 202 LBS | TEMPERATURE: 98 F | HEART RATE: 98 BPM

## 2021-12-21 DIAGNOSIS — R11.2 NON-INTRACTABLE VOMITING WITH NAUSEA, UNSPECIFIED VOMITING TYPE: Primary | ICD-10-CM

## 2021-12-21 LAB
ALBUMIN SERPL-MCNC: 3.8 G/DL (ref 3.5–5)
ALBUMIN/GLOB SERPL: 0.8 {RATIO} (ref 1.1–2.2)
ALP SERPL-CCNC: 112 U/L (ref 45–117)
ALT SERPL-CCNC: 22 U/L (ref 12–78)
ANION GAP SERPL CALC-SCNC: 9 MMOL/L (ref 5–15)
APPEARANCE UR: ABNORMAL
AST SERPL-CCNC: 16 U/L (ref 15–37)
BACTERIA URNS QL MICRO: NEGATIVE /HPF
BASOPHILS # BLD: 0 K/UL (ref 0–0.1)
BASOPHILS NFR BLD: 1 % (ref 0–1)
BILIRUB SERPL-MCNC: 0.4 MG/DL (ref 0.2–1)
BILIRUB UR QL CFM: NEGATIVE
BUN SERPL-MCNC: 19 MG/DL (ref 6–20)
BUN/CREAT SERPL: 12 (ref 12–20)
CALCIUM SERPL-MCNC: 9.9 MG/DL (ref 8.5–10.1)
CHLORIDE SERPL-SCNC: 103 MMOL/L (ref 97–108)
CO2 SERPL-SCNC: 28 MMOL/L (ref 21–32)
COLOR UR: ABNORMAL
CREAT SERPL-MCNC: 1.53 MG/DL (ref 0.55–1.02)
DIFFERENTIAL METHOD BLD: ABNORMAL
EOSINOPHIL # BLD: 0 K/UL (ref 0–0.4)
EOSINOPHIL NFR BLD: 0 % (ref 0–7)
EPITH CASTS URNS QL MICRO: ABNORMAL /LPF
ERYTHROCYTE [DISTWIDTH] IN BLOOD BY AUTOMATED COUNT: 16 % (ref 11.5–14.5)
GLOBULIN SER CALC-MCNC: 5 G/DL (ref 2–4)
GLUCOSE BLD STRIP.AUTO-MCNC: 187 MG/DL (ref 65–117)
GLUCOSE SERPL-MCNC: 172 MG/DL (ref 65–100)
GLUCOSE UR STRIP.AUTO-MCNC: NEGATIVE MG/DL
HCT VFR BLD AUTO: 40.2 % (ref 35–47)
HGB BLD-MCNC: 13.1 G/DL (ref 11.5–16)
HGB UR QL STRIP: NEGATIVE
HYALINE CASTS URNS QL MICRO: ABNORMAL /LPF (ref 0–5)
IMM GRANULOCYTES # BLD AUTO: 0 K/UL (ref 0–0.04)
IMM GRANULOCYTES NFR BLD AUTO: 0 % (ref 0–0.5)
KETONES UR QL STRIP.AUTO: 15 MG/DL
LEUKOCYTE ESTERASE UR QL STRIP.AUTO: NEGATIVE
LIPASE SERPL-CCNC: 56 U/L (ref 73–393)
LYMPHOCYTES # BLD: 1.7 K/UL (ref 0.8–3.5)
LYMPHOCYTES NFR BLD: 33 % (ref 12–49)
MCH RBC QN AUTO: 27.7 PG (ref 26–34)
MCHC RBC AUTO-ENTMCNC: 32.6 G/DL (ref 30–36.5)
MCV RBC AUTO: 85 FL (ref 80–99)
MONOCYTES # BLD: 0.4 K/UL (ref 0–1)
MONOCYTES NFR BLD: 7 % (ref 5–13)
NEUTS SEG # BLD: 3 K/UL (ref 1.8–8)
NEUTS SEG NFR BLD: 59 % (ref 32–75)
NITRITE UR QL STRIP.AUTO: NEGATIVE
NRBC # BLD: 0 K/UL (ref 0–0.01)
NRBC BLD-RTO: 0 PER 100 WBC
PH UR STRIP: 5.5 [PH] (ref 5–8)
PLATELET # BLD AUTO: 326 K/UL (ref 150–400)
PMV BLD AUTO: 11 FL (ref 8.9–12.9)
POTASSIUM SERPL-SCNC: 3.6 MMOL/L (ref 3.5–5.1)
PROT SERPL-MCNC: 8.8 G/DL (ref 6.4–8.2)
PROT UR STRIP-MCNC: 100 MG/DL
RBC # BLD AUTO: 4.73 M/UL (ref 3.8–5.2)
RBC #/AREA URNS HPF: ABNORMAL /HPF (ref 0–5)
SERVICE CMNT-IMP: ABNORMAL
SODIUM SERPL-SCNC: 140 MMOL/L (ref 136–145)
SP GR UR REFRACTOMETRY: 1.02 (ref 1–1.03)
UR CULT HOLD, URHOLD: NORMAL
UROBILINOGEN UR QL STRIP.AUTO: 0.2 EU/DL (ref 0.2–1)
WBC # BLD AUTO: 5.1 K/UL (ref 3.6–11)
WBC URNS QL MICRO: ABNORMAL /HPF (ref 0–4)

## 2021-12-21 PROCEDURE — 81001 URINALYSIS AUTO W/SCOPE: CPT

## 2021-12-21 PROCEDURE — 96374 THER/PROPH/DIAG INJ IV PUSH: CPT

## 2021-12-21 PROCEDURE — 74011250636 HC RX REV CODE- 250/636: Performed by: EMERGENCY MEDICINE

## 2021-12-21 PROCEDURE — 99283 EMERGENCY DEPT VISIT LOW MDM: CPT

## 2021-12-21 PROCEDURE — 85025 COMPLETE CBC W/AUTO DIFF WBC: CPT

## 2021-12-21 PROCEDURE — 82962 GLUCOSE BLOOD TEST: CPT

## 2021-12-21 PROCEDURE — 83690 ASSAY OF LIPASE: CPT

## 2021-12-21 PROCEDURE — 74011250637 HC RX REV CODE- 250/637: Performed by: EMERGENCY MEDICINE

## 2021-12-21 PROCEDURE — 36415 COLL VENOUS BLD VENIPUNCTURE: CPT

## 2021-12-21 PROCEDURE — 96361 HYDRATE IV INFUSION ADD-ON: CPT

## 2021-12-21 PROCEDURE — 80053 COMPREHEN METABOLIC PANEL: CPT

## 2021-12-21 RX ORDER — ONDANSETRON 2 MG/ML
4 INJECTION INTRAMUSCULAR; INTRAVENOUS
Status: COMPLETED | OUTPATIENT
Start: 2021-12-21 | End: 2021-12-21

## 2021-12-21 RX ORDER — ACETAMINOPHEN 325 MG/1
650 TABLET ORAL
Status: COMPLETED | OUTPATIENT
Start: 2021-12-21 | End: 2021-12-21

## 2021-12-21 RX ORDER — ONDANSETRON 4 MG/1
4 TABLET, ORALLY DISINTEGRATING ORAL
Qty: 20 TABLET | Refills: 0 | Status: SHIPPED | OUTPATIENT
Start: 2021-12-21 | End: 2022-05-26

## 2021-12-21 RX ADMIN — SODIUM CHLORIDE 1000 ML: 9 INJECTION, SOLUTION INTRAVENOUS at 13:41

## 2021-12-21 RX ADMIN — ONDANSETRON 4 MG: 2 INJECTION INTRAMUSCULAR; INTRAVENOUS at 13:44

## 2021-12-21 RX ADMIN — ACETAMINOPHEN 650 MG: 325 TABLET ORAL at 13:43

## 2021-12-21 NOTE — ED PROVIDER NOTES
Ashley Lagos is a 78 yo F with back pain, nausea, vomiting and chills. She states she has been vomiting and unable to tolerate PO for the past 4 days. She has not been able to keep down her medications. She denies shortness of breath or chest pain. Past Medical History:   Diagnosis Date    [facility]     Genital herpes simplex type 2     GERD (gastroesophageal reflux disease)     no medication prescribed    Hypercholesterolemia     Hypertension     Narcolepsy     Neuropathy in diabetes ([facility]     Obesity (BMI 30.0-34. 9)        Past Surgical History:   Procedure Laterality Date    COLONOSCOPY N/A 2019    COLONOSCOPY performed by Juany Campbell MD at [address]  03/15/1979    HX  SECTION  1990    HX KNEE REPLACEMENT Bilateral     HX LIPOMA RESECTION Right 2019    Right buttock.  HX MOHS PROCEDURES Right     HX ROTATOR CUFF REPAIR Right     [place] EXTRAC ERUPTED TOOTH/EXPOSED ROOT Right 14    3 UPPER RIGHT SIDE BACK TEETH         Family History:   Problem Relation Age of Onset    Diabetes Mother     Heart Disease Mother     Hypertension Mother    [employer] Arthritis-rheumatoid Mother     Diabetes Father     Heart Disease Father     Cancer Maternal Aunt         bone    Diabetes Sister        Social History     Socioeconomic History    Marital status:      Spouse name: Not on file    Number of children: Not on file    Years of education: Not on file    Highest education level: Not on file   Occupational History    Not on file   Tobacco Use    Smoking status: Never Smoker    Smokeless tobacco: Never Used   Vaping Use    Vaping Use: Never used   Substance and Sexual Activity    Alcohol use:  Yes     Alcohol/week: 0.0 - 1.0 standard drinks    Drug use: No    Sexual activity: Yes     Partners: Male     Birth control/protection: None   Other Topics Concern    Not on file   Social History Narrative    Not on file     Social Determinants of Health     Financial Resource Strain:     Difficulty of Paying Living Expenses: Not on file   Food Insecurity:     Worried About Running Out of Food in the Last Year: Not on file    Tanya of Food in the Last Year: Not on file   Transportation Needs:     Lack of Transportation (Medical): Not on file    Lack of Transportation (Non-Medical): Not on file   Physical Activity:     Days of Exercise per Week: Not on file    Minutes of Exercise per Session: Not on file   Stress:     Feeling of Stress : Not on file   Social Connections:     Frequency of Communication with Friends and Family: Not on file    Frequency of Social Gatherings with Friends and Family: Not on file    Attends Baptism Services: Not on file    Active Member of [employer] or Organizations: Not on file    Attends Club or Organization Meetings: Not on file    Marital Status: Not on file   Intimate Partner Violence:     Fear of Current or Ex-Partner: Not on file    Emotionally Abused: Not on file    Physically Abused: Not on file    Sexually Abused: Not on file   Housing Stability:     Unable to Pay for Housing in the Last Year: Not on file    Number of [place] in the Last Year: Not on file    Unstable Housing in the Last Year: Not on file         ALLERGIES: Aspirin and Clindamycin    Review of Systems   Constitutional: Positive for chills. Negative for fever. HENT: Negative for sore throat. Eyes: Negative for visual disturbance. Respiratory: Negative for cough. Cardiovascular: Negative for chest pain. Gastrointestinal: Positive for nausea and vomiting. Genitourinary: Negative for dysuria. Musculoskeletal: Positive for back pain. Skin: Negative for rash. Neurological: Negative for headaches.        Vitals:    12/21/21 1243 12/21/21 1400   BP: (!) 148/81 (!) 150/90   Pulse: (!) 110 98   Resp: 16 16   Temp: 99.2 °F (37.3 °C) 98 °F (36.7 °C)   SpO2: 98% 99%   Weight: 91.6 kg (202 lb)    Height: 5' 3\" (1.6 m)             Physical Exam  Vitals and nursing note reviewed. Constitutional:       General: She is not in acute distress. Appearance: She is well-developed. HENT:      Head: Normocephalic and atraumatic. Eyes:      Conjunctiva/sclera: Conjunctivae normal.   Neck:      Trachea: Phonation normal.   Cardiovascular:      Rate and Rhythm: Normal rate. Pulmonary:      Effort: Pulmonary effort is normal. No respiratory distress. Breath sounds: No wheezing. Abdominal:      General: There is no distension. Tenderness: There is no abdominal tenderness. There is no right CVA tenderness or left CVA tenderness. Musculoskeletal:         General: No tenderness. Normal range of motion. Cervical back: Normal range of motion. Skin:     General: Skin is warm and dry. Neurological:      Mental Status: She is alert. She is not disoriented. Motor: No abnormal muscle tone. MDM         4:03 PM  Patient reassessed and states that she is feeling much better after zofran and IVNS. Labs normal.  Tolerating PO. Will discharge home with prescription for zofran.    Procedures

## 2021-12-21 NOTE — ED TRIAGE NOTES
Patient presents with nausea, vomiting, chills and back pain since Saturday-    Patient reports she is been unable to tolerate anything PO since Saturday including her daily medications-

## 2022-01-01 NOTE — TELEPHONE ENCOUNTER
Patient called about medication request.  Matthew Sears her it was approved yesterday and that a my chart message had been sent to her. Said she had not reviewed but did ask if she had to pick it up as she did not want this. Per review at , the script was not there and the nurse was not available. Advised her to contact the pharmacy.
Patient called to let physician she know that she has medicine for today and tomorrow only.
Patient needs a refill of the following  Requested Prescriptions     Pending Prescriptions Disp Refills    traMADol (ULTRAM) 50 mg tablet 120 Tab 0     Sig: Take 1 Tab by mouth every six (6) hours as needed for Pain. Max Daily Amount: 200 mg.
Statement Selected

## 2022-01-19 ENCOUNTER — OFFICE VISIT (OUTPATIENT)
Dept: ENDOCRINOLOGY | Age: 67
End: 2022-01-19
Payer: MEDICARE

## 2022-01-19 VITALS
HEART RATE: 91 BPM | OXYGEN SATURATION: 98 % | TEMPERATURE: 98.1 F | BODY MASS INDEX: 35.61 KG/M2 | RESPIRATION RATE: 22 BRPM | DIASTOLIC BLOOD PRESSURE: 80 MMHG | WEIGHT: 201 LBS | HEIGHT: 63 IN | SYSTOLIC BLOOD PRESSURE: 139 MMHG

## 2022-01-19 DIAGNOSIS — E78.00 HYPERCHOLESTEROLEMIA: ICD-10-CM

## 2022-01-19 DIAGNOSIS — E11.65 TYPE 2 DIABETES MELLITUS WITH HYPERGLYCEMIA, WITHOUT LONG-TERM CURRENT USE OF INSULIN (HCC): Primary | ICD-10-CM

## 2022-01-19 DIAGNOSIS — I10 PRIMARY HYPERTENSION: ICD-10-CM

## 2022-01-19 DIAGNOSIS — E11.65 TYPE 2 DIABETES MELLITUS WITH HYPERGLYCEMIA, WITHOUT LONG-TERM CURRENT USE OF INSULIN (HCC): ICD-10-CM

## 2022-01-19 DIAGNOSIS — E11.65 TYPE 2 DIABETES MELLITUS WITH HYPERGLYCEMIA, UNSPECIFIED WHETHER LONG TERM INSULIN USE (HCC): ICD-10-CM

## 2022-01-19 PROCEDURE — G8536 NO DOC ELDER MAL SCRN: HCPCS | Performed by: INTERNAL MEDICINE

## 2022-01-19 PROCEDURE — G8432 DEP SCR NOT DOC, RNG: HCPCS | Performed by: INTERNAL MEDICINE

## 2022-01-19 PROCEDURE — G8752 SYS BP LESS 140: HCPCS | Performed by: INTERNAL MEDICINE

## 2022-01-19 PROCEDURE — G8399 PT W/DXA RESULTS DOCUMENT: HCPCS | Performed by: INTERNAL MEDICINE

## 2022-01-19 PROCEDURE — 1090F PRES/ABSN URINE INCON ASSESS: CPT | Performed by: INTERNAL MEDICINE

## 2022-01-19 PROCEDURE — G8427 DOCREV CUR MEDS BY ELIG CLIN: HCPCS | Performed by: INTERNAL MEDICINE

## 2022-01-19 PROCEDURE — 1101F PT FALLS ASSESS-DOCD LE1/YR: CPT | Performed by: INTERNAL MEDICINE

## 2022-01-19 PROCEDURE — G8754 DIAS BP LESS 90: HCPCS | Performed by: INTERNAL MEDICINE

## 2022-01-19 PROCEDURE — G8417 CALC BMI ABV UP PARAM F/U: HCPCS | Performed by: INTERNAL MEDICINE

## 2022-01-19 PROCEDURE — 3051F HG A1C>EQUAL 7.0%<8.0%: CPT | Performed by: INTERNAL MEDICINE

## 2022-01-19 PROCEDURE — G9899 SCRN MAM PERF RSLTS DOC: HCPCS | Performed by: INTERNAL MEDICINE

## 2022-01-19 PROCEDURE — 3017F COLORECTAL CA SCREEN DOC REV: CPT | Performed by: INTERNAL MEDICINE

## 2022-01-19 PROCEDURE — 2022F DILAT RTA XM EVC RTNOPTHY: CPT | Performed by: INTERNAL MEDICINE

## 2022-01-19 PROCEDURE — 99214 OFFICE O/P EST MOD 30 MIN: CPT | Performed by: INTERNAL MEDICINE

## 2022-01-19 RX ORDER — LANOLIN ALCOHOL/MO/W.PET/CERES
1000 CREAM (GRAM) TOPICAL DAILY
COMMUNITY

## 2022-01-19 RX ORDER — LANCETS
EACH MISCELLANEOUS
Qty: 100 EACH | Refills: 3 | Status: SHIPPED | OUTPATIENT
Start: 2022-01-19

## 2022-01-19 RX ORDER — METFORMIN HYDROCHLORIDE 1000 MG/1
1000 TABLET ORAL 2 TIMES DAILY WITH MEALS
Qty: 180 TABLET | Refills: 3 | Status: SHIPPED | OUTPATIENT
Start: 2022-01-19 | End: 2022-05-26 | Stop reason: SDUPTHER

## 2022-01-19 RX ORDER — DULAGLUTIDE 3 MG/.5ML
3 INJECTION, SOLUTION SUBCUTANEOUS
Qty: 12 EACH | Refills: 3 | Status: SHIPPED | OUTPATIENT
Start: 2022-01-19 | End: 2022-05-26 | Stop reason: SDUPTHER

## 2022-01-19 RX ORDER — BLOOD SUGAR DIAGNOSTIC
STRIP MISCELLANEOUS
Qty: 100 STRIP | Refills: 3 | Status: SHIPPED | OUTPATIENT
Start: 2022-01-19

## 2022-01-19 RX ORDER — GLIPIZIDE 10 MG/1
TABLET ORAL
Qty: 180 TABLET | Refills: 3 | Status: SHIPPED | OUTPATIENT
Start: 2022-01-19 | End: 2022-05-26 | Stop reason: SDUPTHER
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
## 2022-01-19 NOTE — LETTER
1/20/2022    Patient: Flakita Small   YOB: 1955   Date of Visit: 1/19/2022     Keely Cueto MD  [address]  [place] [facility] [address]    Dear Keely Cueto MD,      Thank you for referring Ms. Esther Baptiste to [address] [place] [address] for evaluation. My notes for this consultation are attached. If you have questions, please do not hesitate to call me. I look forward to following your patient along with you.       Sincerely,    Rubina De Souza MD

## 2022-01-19 NOTE — PROGRESS NOTES
Rowena Bush is a 77 y.o. female here for   Chief Complaint   Patient presents with    Diabetes       1. Have you been to the ER, urgent care clinic since your last visit? Hospitalized since your last visit? -[facility] 12/21/21 for vomiting    2. Have you seen or consulted any other health care providers outside of the [address] [place] [address] since your last visit?   Include any pap smears or colon screening.-no

## 2022-01-20 LAB
BUN SERPL-MCNC: 28 MG/DL (ref 8–27)
BUN/CREAT SERPL: 21 (ref 12–28)
CALCIUM SERPL-MCNC: 9.7 MG/DL (ref 8.7–10.3)
CHLORIDE SERPL-SCNC: 105 MMOL/L (ref 96–106)
CO2 SERPL-SCNC: 23 MMOL/L (ref 20–29)
CREAT SERPL-MCNC: 1.35 MG/DL (ref 0.57–1)
EST. AVERAGE GLUCOSE BLD GHB EST-MCNC: 169 MG/DL
GLUCOSE SERPL-MCNC: 96 MG/DL (ref 65–99)
HBA1C MFR BLD: 7.5 % (ref 4.8–5.6)
INTERPRETATION: NORMAL
POTASSIUM SERPL-SCNC: 4.1 MMOL/L (ref 3.5–5.2)
SODIUM SERPL-SCNC: 144 MMOL/L (ref 134–144)

## 2022-02-14 ENCOUNTER — OFFICE VISIT (OUTPATIENT)
Dept: FAMILY MEDICINE CLINIC | Age: 67
End: 2022-02-14
Payer: MEDICARE

## 2022-02-14 VITALS
BODY MASS INDEX: 34.84 KG/M2 | HEART RATE: 76 BPM | SYSTOLIC BLOOD PRESSURE: 132 MMHG | WEIGHT: 196.6 LBS | HEIGHT: 63 IN | DIASTOLIC BLOOD PRESSURE: 83 MMHG | OXYGEN SATURATION: 94 % | TEMPERATURE: 98 F | RESPIRATION RATE: 16 BRPM

## 2022-02-14 DIAGNOSIS — R23.2 HOT FLASHES: ICD-10-CM

## 2022-02-14 DIAGNOSIS — R61 NIGHT SWEAT: ICD-10-CM

## 2022-02-14 DIAGNOSIS — R25.1 TREMOR OF BOTH HANDS: ICD-10-CM

## 2022-02-14 DIAGNOSIS — G62.9 PERIPHERAL POLYNEUROPATHY: ICD-10-CM

## 2022-02-14 DIAGNOSIS — E11.65 TYPE 2 DIABETES MELLITUS WITH HYPERGLYCEMIA, UNSPECIFIED WHETHER LONG TERM INSULIN USE (HCC): Primary | ICD-10-CM

## 2022-02-14 PROCEDURE — G8536 NO DOC ELDER MAL SCRN: HCPCS | Performed by: FAMILY MEDICINE

## 2022-02-14 PROCEDURE — 3017F COLORECTAL CA SCREEN DOC REV: CPT | Performed by: FAMILY MEDICINE

## 2022-02-14 PROCEDURE — G8754 DIAS BP LESS 90: HCPCS | Performed by: FAMILY MEDICINE

## 2022-02-14 PROCEDURE — G8432 DEP SCR NOT DOC, RNG: HCPCS | Performed by: FAMILY MEDICINE

## 2022-02-14 PROCEDURE — G8427 DOCREV CUR MEDS BY ELIG CLIN: HCPCS | Performed by: FAMILY MEDICINE

## 2022-02-14 PROCEDURE — 1101F PT FALLS ASSESS-DOCD LE1/YR: CPT | Performed by: FAMILY MEDICINE

## 2022-02-14 PROCEDURE — G9899 SCRN MAM PERF RSLTS DOC: HCPCS | Performed by: FAMILY MEDICINE

## 2022-02-14 PROCEDURE — G8752 SYS BP LESS 140: HCPCS | Performed by: FAMILY MEDICINE

## 2022-02-14 PROCEDURE — 3051F HG A1C>EQUAL 7.0%<8.0%: CPT | Performed by: FAMILY MEDICINE

## 2022-02-14 PROCEDURE — 1090F PRES/ABSN URINE INCON ASSESS: CPT | Performed by: FAMILY MEDICINE

## 2022-02-14 PROCEDURE — 99214 OFFICE O/P EST MOD 30 MIN: CPT | Performed by: FAMILY MEDICINE

## 2022-02-14 PROCEDURE — G8399 PT W/DXA RESULTS DOCUMENT: HCPCS | Performed by: FAMILY MEDICINE

## 2022-02-14 PROCEDURE — G8417 CALC BMI ABV UP PARAM F/U: HCPCS | Performed by: FAMILY MEDICINE

## 2022-02-14 PROCEDURE — 2022F DILAT RTA XM EVC RTNOPTHY: CPT | Performed by: FAMILY MEDICINE

## 2022-02-14 NOTE — PROGRESS NOTES
Kevin Peralta is a 77 y.o. female    Chief Complaint   Patient presents with    Hand Pain     Patient states that herves on her hands was affecting her spreech. She states that this has been going on for years, but now she does not have it anymore. No other concerns. 1. Have you been to the ER, urgent care clinic since your last visit? Hospitalized since your last visit? No  2. Have you seen or consulted any other health care providers outside of the [address] [place] [address] since your last visit? Include any pap smears or colon screening. No    Visit Vitals  /83 (BP 1 Location: Right upper arm, BP Patient Position: Sitting)   Pulse 76   Temp 98 °F (36.7 °C) (Oral)   Resp 16   Ht 5' 3\" (1.6 m)   Wt 196 lb 9.6 oz (89.2 kg)   SpO2 94%   BMI 34.83 kg/m²           Health Maintenance Due   Topic Date Due    Shingrix Vaccine Age 49> (1 of 2) Never done    MICROALBUMIN Q1  05/25/2019    Flu Vaccine (1) Never done    Pneumococcal 65+ years (1 of 1 - PPSV23) 12/13/2021    COVID-19 Vaccine (3 - Booster for Pfizer series) 12/15/2021    Foot Exam Q1  02/10/2022    Lipid Screen  02/10/2022         Medication Reconciliation completed, changes noted.   Please  Update medication list.

## 2022-02-14 NOTE — PATIENT INSTRUCTIONS
Jack Simmons MD  Get online care: [employer]  Address: [address], [place], [address] [facility]  Phone: [phone]

## 2022-02-14 NOTE — PROGRESS NOTES
Subjective:   Erika Vizcaino is an 77 y.o. female who presents for evaluation of acute concerns. HPI  Chief Complaint   Patient presents with    Hand Pain     Patient states that herves on her hands was affecting her spreech. She states that this has been going on for years, but now she does not have it anymore. No other concerns. Has been having speech problem  Tramadol Three  Times a day    1. Tremor  She reports having persistent resting tremors with action in both of her hands for the last couple of months. The symptoms have been present every day but resolved today, associated with bilateral numbness and tingling. No pain in her hands. The symptoms do not interfere with daily activities however makes her feel uncomfortable. She has chronic LE peripheral neuropathy. Sometimes is having \"more wobbly \" gait. 2. Slowing of the speech   She noticed that over the last year her speech has been slower than usual. She denies any difficulties finding the words or memory problems. 3. Hot flashes   Has been present for years, some of the days worse than others associated with night sweats. No weight loss. No recent medication changes except increased dose of Tramadol 50 mg to TID dosing by the podiatrist. However the symptoms started prior to medication change. Allergies - reviewed: Allergies   Allergen Reactions    Aspirin Hives     Tolerates ibuprofen and naproxen    Clindamycin Rash and Hives         Medications - reviewed:  Current Outpatient Medications   Medication Sig    cyanocobalamin (Vitamin B-12) 1,000 mcg tablet Take 1,000 mcg by mouth daily.  metFORMIN (GLUCOPHAGE) 1,000 mg tablet Take 1 Tablet by mouth two (2) times daily (with meals).     lancets (Accu-Chek Fastclix Lancet Drum) misc Test once daily Dx Code: E11.65    glucose blood VI test strips (Accu-Chek Laurel Plus test strp) strip Test once daily Dx Code: E11.65    glipiZIDE (GLUCOTROL) 10 mg tablet TAKE 1 TABLET BY MOUTH TWICE DAILY    dulaglutide (Trulicity) 3 FC/2.1 mL pnij 3 mg by SubCUTAneous route every seven (7) days. Stop 1.5 mg    valACYclovir (VALTREX) 1 gram tablet Take 1 Tab by mouth two (2) times a day. (Patient taking differently: Take 1,000 mg by mouth as needed.)    nystatin (MYCOSTATIN) topical cream Apply  to affected area two (2) times a day.  amLODIPine-benazepril (LOTREL) 10-20 mg per capsule Take 1 Cap by mouth daily.  hydroCHLOROthiazide (HYDRODIURIL) 25 mg tablet TAKE 1 TABLET BY MOUTH DAILY    rosuvastatin (CRESTOR) 10 mg tablet TAKE 1 TABLET BY MOUTH DAILY    traMADoL (ULTRAM) 50 mg tablet Take 50 mg by mouth three (3) times daily.  pregabalin (LYRICA) 100 mg capsule Take 100 mg by mouth three (3) times daily.  ammonium lactate (LAC-HYDRIN) 12 % lotion JEMAL EXT TO ALL SURFACES OF BOTH FEET BID    Blood-Glucose Meter (ACCU-CHEK GEORGINA PLUS METER) misc Test once daily Dx Code: E11.65    peg 400-propylene glycol (SYSTANE, PROPYLENE GLYCOL,) 0.4-0.3 % drop Administer 1 Drop to both eyes as needed.  omega-3 fatty acids-vitamin e (FISH OIL) 1,000 mg Cap Take 1 Cap by mouth daily.  ondansetron (ZOFRAN ODT) 4 mg disintegrating tablet Take 1 Tablet by mouth every eight (8) hours as needed for Nausea or Vomiting. (Patient not taking: Reported on 2/14/2022)    varicella-zoster recombinant, PF, (Shingrix, PF,) 50 mcg/0.5 mL susr injection 0.5mL by IntraMUSCular route once now and then repeat in 2-6 months (Patient not taking: Reported on 5/20/2021)     No current facility-administered medications for this visit. Past Medical History - reviewed:  Past Medical History:   Diagnosis Date    Diabetes ([facility] [address] 1995    Genital herpes simplex type 2     GERD (gastroesophageal reflux disease)     no medication prescribed    Hypercholesterolemia     Hypertension     Narcolepsy     Neuropathy in diabetes ([facility] [address]     Obesity (BMI 30.0-34. 9)          Review of Systems CONSTITUTIONAL: denies fever. Denies chills. CARDIOVASCULAR: denies chest pain. Denies palpitations  RESPIRATORY: denies shortness of breath  NEURO: +Tremors, + peripheral neuropathy         Objective:     Visit Vitals  /83 (BP 1 Location: Right upper arm, BP Patient Position: Sitting)   Pulse 76   Temp 98 °F (36.7 °C) (Oral)   Resp 16   Ht 5' 3\" (1.6 m)   Wt 196 lb 9.6 oz (89.2 kg)   LMP 01/01/2008 (LMP Unknown)   SpO2 94%   BMI 34.83 kg/m²       General appearance - alert, well appearing, and in no distress  Neck - supple, no significant adenopathy  Chest - clear to auscultation, no wheezes, rales or rhonchi, symmetric air entry  Heart - normal rate, regular rhythm, normal S1, S2, no murmurs, rubs, clicks or gallops  Neurological - alert, oriented, normal speech, no focal findings or movement disorder noted      Assessment:   Marely Hinson is a 77 y.o. female who was seen today for:    ICD-10-CM ICD-9-CM    1. Type 2 diabetes mellitus with hyperglycemia, unspecified whether long term insulin use (HCC)  E11.65 250.00 TSH 3RD GENERATION      CBC W/O DIFF      LIPID PANEL      LIPID PANEL      CBC W/O DIFF      TSH 3RD GENERATION   2. Tremor of both hands  R25.1 781.0 REFERRAL TO NEUROLOGY   3. Hot flashes  R23.2 782.62 TSH 3RD GENERATION      CBC W/O DIFF      LIPID PANEL      LIPID PANEL      CBC W/O DIFF      TSH 3RD GENERATION   4. Night sweat  R61 780.8 TSH 3RD GENERATION      TSH 3RD GENERATION   5. Peripheral polyneuropathy  G62.9 356.9 REFERRAL TO NEUROLOGY           Plan:   1. Tremors. No tremor is present today . ? Psychogenic tremor however no new medication use or anxiety. Advised the patient to monitor and if reoccur will reeval to determine if its a essential tremor. Less likely Parkinsonism however will monitor gait as well. May need further neurology eval pending the symptoms change. 2. Peripheral neuropathy due to diabetes. Continue Cymbalta.  If continues to worsen will need neurology eval.     Follow-up and Dispositions    · [facility] about 2 [facility] (around 4/14/2022[facility]  . I have discussed the diagnosis with the patient and the intended plan as seen in the above orders. The patient has received an after-visit summary and questions were answered concerning future plans. I have discussed medication side effects and warnings with the patient as well. Informed pt to return to the office if new symptoms arise.       Roberto Remy MD  Family Medicine Attending Physician

## 2022-02-15 LAB
CHOLEST SERPL-MCNC: 166 MG/DL (ref 100–199)
ERYTHROCYTE [DISTWIDTH] IN BLOOD BY AUTOMATED COUNT: 16 % (ref 11.7–15.4)
HCT VFR BLD AUTO: 36.5 % (ref 34–46.6)
HDLC SERPL-MCNC: 57 MG/DL
HGB BLD-MCNC: 12 G/DL (ref 11.1–15.9)
IMP & REVIEW OF LAB RESULTS: NORMAL
LDLC SERPL CALC-MCNC: 92 MG/DL (ref 0–99)
MCH RBC QN AUTO: 28.5 PG (ref 26.6–33)
MCHC RBC AUTO-ENTMCNC: 32.9 G/DL (ref 31.5–35.7)
MCV RBC AUTO: 87 FL (ref 79–97)
PLATELET # BLD AUTO: 263 X10E3/UL (ref 150–450)
RBC # BLD AUTO: 4.21 X10E6/UL (ref 3.77–5.28)
TRIGL SERPL-MCNC: 95 MG/DL (ref 0–149)
TSH SERPL DL<=0.005 MIU/L-ACNC: 1.5 UIU/ML (ref 0.45–4.5)
VLDLC SERPL CALC-MCNC: 17 MG/DL (ref 5–40)
WBC # BLD AUTO: 4.9 X10E3/UL (ref 3.4–10.8)

## 2022-02-15 NOTE — PROGRESS NOTES
CBC and TSH WNL. Lipid panel with , TG 95, LDL 92 (at goal) , the pt is already on statin and should counctinue . Called the patient to discuss the results.

## 2022-03-18 PROBLEM — L73.2 RIGHT AXILLARY HIDRADENITIS: Status: ACTIVE | Noted: 2021-07-13

## 2022-03-18 PROBLEM — B00.9 HSV-2 INFECTION: Status: ACTIVE | Noted: 2018-12-01

## 2022-03-18 PROBLEM — Z86.018 S/P EXCISION OF LIPOMA: Status: ACTIVE | Noted: 2019-07-17

## 2022-03-18 PROBLEM — S22.41XD CLOSED FRACTURE OF MULTIPLE RIBS OF RIGHT SIDE WITH ROUTINE HEALING: Status: ACTIVE | Noted: 2017-11-08

## 2022-03-18 PROBLEM — Z98.890 S/P EXCISION OF LIPOMA: Status: ACTIVE | Noted: 2019-07-17

## 2022-03-19 PROBLEM — S12.001A CLOSED NONDISPLACED FRACTURE OF FIRST CERVICAL VERTEBRA (HCC): Status: ACTIVE | Noted: 2017-11-08

## 2022-03-19 PROBLEM — E11.65 TYPE 2 DIABETES MELLITUS WITH HYPERGLYCEMIA, WITHOUT LONG-TERM CURRENT USE OF INSULIN (HCC): Status: ACTIVE | Noted: 2017-08-14

## 2022-03-19 PROBLEM — Z98.890 S/P COLONOSCOPY: Status: ACTIVE | Noted: 2021-06-30

## 2022-03-19 PROBLEM — E66.9 OBESITY (BMI 30.0-34.9): Status: ACTIVE | Noted: 2019-06-25

## 2022-03-19 PROBLEM — E11.21 TYPE 2 DIABETES MELLITUS WITH DIABETIC NEPHROPATHY, WITHOUT LONG-TERM CURRENT USE OF INSULIN (HCC): Status: ACTIVE | Noted: 2017-08-14

## 2022-03-19 PROBLEM — E66.01 SEVERE OBESITY (HCC): Status: ACTIVE | Noted: 2019-08-14

## 2022-03-19 PROBLEM — Z12.39 BREAST CANCER SCREENING: Status: ACTIVE | Noted: 2018-12-01

## 2022-03-19 PROBLEM — Z12.4 CERVICAL CANCER SCREENING: Status: ACTIVE | Noted: 2018-12-01

## 2022-03-19 PROBLEM — E11.40 TYPE 2 DIABETES MELLITUS WITH DIABETIC NEUROPATHY (HCC): Status: ACTIVE | Noted: 2018-10-03

## 2022-03-20 PROBLEM — L08.9 INFECTION OF SKIN DUE TO METHICILLIN RESISTANT STAPHYLOCOCCUS AUREUS (MRSA): Status: ACTIVE | Noted: 2020-12-05

## 2022-03-20 PROBLEM — B02.9 SHINGLES RASH: Status: ACTIVE | Noted: 2019-07-03

## 2022-03-20 PROBLEM — B95.62 INFECTION OF SKIN DUE TO METHICILLIN RESISTANT STAPHYLOCOCCUS AUREUS (MRSA): Status: ACTIVE | Noted: 2020-12-05

## 2022-04-13 DIAGNOSIS — I10 ESSENTIAL HYPERTENSION WITH GOAL BLOOD PRESSURE LESS THAN 130/80: ICD-10-CM

## 2022-04-13 RX ORDER — HYDROCHLOROTHIAZIDE 25 MG/1
TABLET ORAL
Qty: 90 TABLET | Refills: 3 | Status: SHIPPED | OUTPATIENT
Start: 2022-04-13 | End: 2022-04-21 | Stop reason: SDUPTHER

## 2022-04-15 ENCOUNTER — TELEPHONE (OUTPATIENT)
Dept: FAMILY MEDICINE CLINIC | Age: 67
End: 2022-04-15

## 2022-04-15 NOTE — TELEPHONE ENCOUNTER
I called patient ans rescheduled her appointment because she was not due this morning. She is aware about new date and time.

## 2022-04-15 NOTE — TELEPHONE ENCOUNTER
----- Message from Ashlyn Villeda MD sent at 4/15/2022  6:49 AM EDT -----  Good morning,  This patient is scheduled with today for Medicare Wellness but her next Medicare Wellness is due on May 13, 2022. Please call the patient and reschedule her appointment for May 13 or after. Can be done either in person or virtually. Thank you.     Sincerely,  Ashlyn Villeda MD

## 2022-04-21 DIAGNOSIS — I10 ESSENTIAL HYPERTENSION WITH GOAL BLOOD PRESSURE LESS THAN 130/80: ICD-10-CM

## 2022-04-21 RX ORDER — AMLODIPINE AND BENAZEPRIL HYDROCHLORIDE 10; 20 MG/1; MG/1
1 CAPSULE ORAL DAILY
Qty: 90 CAPSULE | Refills: 3 | Status: SHIPPED | OUTPATIENT
Start: 2022-04-21

## 2022-04-21 RX ORDER — HYDROCHLOROTHIAZIDE 25 MG/1
TABLET ORAL
Qty: 90 TABLET | Refills: 3 | Status: SHIPPED | OUTPATIENT
Start: 2022-04-21

## 2022-04-21 NOTE — TELEPHONE ENCOUNTER
Good Morning   Patient is requesting a refill for:  amLODIPine-benazepril (LOTREL) 10-20 mg per capsule    To be sent to the pharmacy. It looks like you already sent the hydrocholathizide. Can you help me with this please?   Thank you

## 2022-05-17 ENCOUNTER — TELEPHONE (OUTPATIENT)
Dept: FAMILY MEDICINE CLINIC | Age: 67
End: 2022-05-17

## 2022-05-17 NOTE — TELEPHONE ENCOUNTER
----- Message from Kale Saunders sent at 5/16/2022 11:05 AM EDT -----  Subject: Message to Provider    QUESTIONS  Information for Provider? Pt missed her AWV and needs to reschedule. Nothing avail to reschedule. Please call pt.  ---------------------------------------------------------------------------  --------------  CALL BACK INFO  What is the best way for the office to contact you? OK to leave message on   voicemail  Preferred Call Back Phone Number? [phone]  ---------------------------------------------------------------------------  --------------  SCRIPT ANSWERS  Relationship to Patient?  Self

## 2022-05-20 LAB
ALBUMIN/CREAT UR: 34 MG/G CREAT (ref 0–29)
BUN SERPL-MCNC: 35 MG/DL (ref 8–27)
BUN/CREAT SERPL: 17 (ref 12–28)
CALCIUM SERPL-MCNC: 9.3 MG/DL (ref 8.7–10.3)
CHLORIDE SERPL-SCNC: 105 MMOL/L (ref 96–106)
CO2 SERPL-SCNC: 23 MMOL/L (ref 20–29)
CREAT SERPL-MCNC: 2.03 MG/DL (ref 0.57–1)
CREAT UR-MCNC: 73.8 MG/DL
EGFR: 27 ML/MIN/1.73
EST. AVERAGE GLUCOSE BLD GHB EST-MCNC: 183 MG/DL
GLUCOSE SERPL-MCNC: 243 MG/DL (ref 65–99)
HBA1C MFR BLD: 8 % (ref 4.8–5.6)
INTERPRETATION: NORMAL
LDLC SERPL DIRECT ASSAY-MCNC: 112 MG/DL (ref 0–99)
MICROALBUMIN UR-MCNC: 24.8 UG/ML
POTASSIUM SERPL-SCNC: 5 MMOL/L (ref 3.5–5.2)
SODIUM SERPL-SCNC: 144 MMOL/L (ref 134–144)

## 2022-05-25 ENCOUNTER — VIRTUAL VISIT (OUTPATIENT)
Dept: FAMILY MEDICINE CLINIC | Age: 67
End: 2022-05-25
Payer: MEDICARE

## 2022-05-25 DIAGNOSIS — Z00.00 MEDICARE ANNUAL WELLNESS VISIT, SUBSEQUENT: Primary | ICD-10-CM

## 2022-05-25 DIAGNOSIS — Z13.39 SCREENING FOR ALCOHOLISM: ICD-10-CM

## 2022-05-25 DIAGNOSIS — Z13.31 SCREENING FOR DEPRESSION: ICD-10-CM

## 2022-05-25 DIAGNOSIS — Z12.31 ENCOUNTER FOR SCREENING MAMMOGRAM FOR MALIGNANT NEOPLASM OF BREAST: ICD-10-CM

## 2022-05-25 PROCEDURE — 1123F ACP DISCUSS/DSCN MKR DOCD: CPT | Performed by: FAMILY MEDICINE

## 2022-05-25 PROCEDURE — G8756 NO BP MEASURE DOC: HCPCS | Performed by: FAMILY MEDICINE

## 2022-05-25 PROCEDURE — 3017F COLORECTAL CA SCREEN DOC REV: CPT | Performed by: FAMILY MEDICINE

## 2022-05-25 PROCEDURE — 1101F PT FALLS ASSESS-DOCD LE1/YR: CPT | Performed by: FAMILY MEDICINE

## 2022-05-25 PROCEDURE — G8432 DEP SCR NOT DOC, RNG: HCPCS | Performed by: FAMILY MEDICINE

## 2022-05-25 PROCEDURE — G9899 SCRN MAM PERF RSLTS DOC: HCPCS | Performed by: FAMILY MEDICINE

## 2022-05-25 PROCEDURE — G8399 PT W/DXA RESULTS DOCUMENT: HCPCS | Performed by: FAMILY MEDICINE

## 2022-05-25 PROCEDURE — G0439 PPPS, SUBSEQ VISIT: HCPCS | Performed by: FAMILY MEDICINE

## 2022-05-25 PROCEDURE — G8428 CUR MEDS NOT DOCUMENT: HCPCS | Performed by: FAMILY MEDICINE

## 2022-05-25 RX ORDER — ZOSTER VACCINE RECOMBINANT, ADJUVANTED 50 MCG/0.5
KIT INTRAMUSCULAR
Qty: 0.5 ML | Refills: 1 | Status: SHIPPED | OUTPATIENT
Start: 2022-05-25 | End: 2022-05-26

## 2022-05-25 NOTE — PATIENT INSTRUCTIONS
Medicare Wellness Visit, Female     The best way to live healthy is to have a lifestyle where you eat a well-balanced diet, exercise regularly, limit alcohol use, and quit all forms of tobacco/nicotine, if applicable. Regular preventive services are another way to keep healthy. Preventive services (vaccines, screening tests, monitoring & exams) can help personalize your care plan, which helps you manage your own care. Screening tests can find health problems at the earliest stages, when they are easiest to treat. [facility] follows the current, evidence-based guidelines published by the [facility] Rogelio May ([facility]) when recommending preventive services for our patients. Because we follow these guidelines, sometimes recommendations change over time as research supports it. (For example, mammograms used to be recommended annually. Even though Medicare will still pay for an annual mammogram, the newer guidelines recommend a mammogram every two years for women of average risk). Of course, you and your doctor may decide to screen more often for some diseases, based on your risk and your co-morbidities (chronic disease you are already diagnosed with). Preventive services for you include:  - Medicare offers their members a free annual wellness visit, which is time for you and your primary care provider to discuss and plan for your preventive service needs. Take advantage of this benefit every year!  -All adults over the age of 72 should receive the recommended pneumonia vaccines. Current USPSTF guidelines recommend a series of two vaccines for the best pneumonia protection.   -All adults should have a flu vaccine yearly and a tetanus vaccine every 10 years.   -All adults age 48 and older should receive the shingles vaccines (series of two vaccines).       -All adults age 38-68 who are overweight should have a diabetes screening test once every three years.   -All adults born between [birth year] and 1965 should be screened once for Hepatitis C.  -Other screening tests and preventive services for persons with diabetes include: an eye exam to screen for diabetic retinopathy, a kidney function test, a foot exam, and stricter control over your cholesterol.   -Cardiovascular screening for adults with routine risk involves an electrocardiogram (ECG) at intervals determined by your doctor.   -Colorectal cancer screenings should be done for adults age 54-65 with no increased risk factors for colorectal cancer. There are a number of acceptable methods of screening for this type of cancer. Each test has its own benefits and drawbacks. Discuss with your doctor what is most appropriate for you during your annual wellness visit. The different tests include: colonoscopy (considered the best screening method), a fecal occult blood test, a fecal DNA test, and sigmoidoscopy.    -A bone mass density test is recommended when a woman turns 65 to screen for osteoporosis. This test is only recommended one time, as a screening. Some providers will use this same test as a disease monitoring tool if you already have osteoporosis. -Breast cancer screenings are recommended every other year for women of normal risk, age 54-69.  -Cervical cancer screenings for women over age 72 are only recommended with certain risk factors.      Here is a list of your current Health Maintenance items (your personalized list of preventive services) with a due date:  Health Maintenance Due   Topic Date Due    Shingles Vaccine (1 of 2) Never done    Pneumococcal Vaccine (2 - PCV) 12/13/2017    COVID-19 Vaccine (3 - Booster for Kudoala series) 12/15/2021    Diabetic Foot Care  02/10/2022

## 2022-05-25 NOTE — PROGRESS NOTES
This is the Subsequent Medicare Annual Wellness Exam, performed 12 months or more after the Initial AWV or the last Subsequent AWV    I have reviewed the patient's medical history in detail and updated the computerized patient record. Assessment/Plan   Education and counseling provided:  End-of-Life planning (with patient's consent)  Screening Mammography  Diabetes outpatient self-management training services    1. Medicare annual wellness visit, subsequent  -     DC ANNUAL ALCOHOL SCREEN [facility]  -     varicella-zoster recombinant, PF, (Shingrix, PF,) 50 mcg/0.5 mL susr injection; 0.5mL by IntraMUSCular route once now and then repeat in 2-6 months, Normal, Disp-0.5 mL, R-1  2. Screening for alcoholism  3. Screening for depression  -     DEPRESSION SCREEN ANNUAL       Depression Risk Factor Screening:     3 most recent PHQ Screens 5/25/2022   PHQ Not Done -   Little interest or pleasure in doing things Not at all   Feeling down, depressed, irritable, or hopeless Not at all   Total Score PHQ 2 0       Alcohol & Drug Abuse Risk Screen    Do you average more than 1 drink per night or more than 7 drinks a week:  No    On any one occasion in the past three months have you have had more than 3 drinks containing alcohol:  No          Functional Ability and Level of Safety:    Hearing: Hearing is good. She reports sometimes she cannot understand what people are telling her. No issues with understanding when watching TV. Activities of Daily Living: The home contains: no safety equipment. Patient does total self care      Ambulation: with no difficulty     Fall Risk:  Fall Risk Assessment, last 12 mths 5/25/2022   Able to walk? Yes   Fall in past 12 months? 0   Do you feel unsteady? 0   Are you worried about falling 0   Number of falls in past 12 months -   Fall with injury?  -      Abuse Screen:  Patient is not abused       Cognitive Screening    Has your family/caregiver stated any What Type Of Note Output Would You Prefer (Optional)?: Bullet Format Hpi Title: Evaluation of Skin Lesions concerns about your memory: no    Cognitive Screening: Normal - MMSE (Mini Mental Status Exam)    Health Maintenance Due     Health Maintenance Due   Topic Date Due    Shingrix Vaccine Age 49> (1 of 2) Never done    Pneumococcal 65+ years (2 - PCV) 12/13/2017    COVID-19 Vaccine (3 - Booster for Pfizer series) 12/15/2021    Foot Exam Q1  02/10/2022       Patient Care Team   Patient Care Team:  Genie Mcguire MD as PCP - General (Family Medicine)  Genie Mcguire MD as PCP - [facility]  Melida Quiñones MD (Endocrinology Physician)  Sherly Whipple MD (Surgery Physician)    History     Patient Active Problem List   Diagnosis Code    Hypertension I10    Diabetes ([address] E11.9    Hypercholesterolemia E78.00    Neuropathy due to secondary diabetes ([address] E13.40    Lumbar stenosis M48.061    Lumbar herniated disc M51.26    Postcoital UTI N39.0    Type 2 diabetes mellitus with diabetic nephropathy, without long-term current use of insulin ([address] E11.21    Type 2 diabetes mellitus with hyperglycemia, without long-term current use of insulin ([address] E11.65    Closed nondisplaced fracture of first cervical vertebra ([address] S12.001A    Closed fracture of multiple ribs of right side with routine healing S22.41XD    Type 2 diabetes mellitus with diabetic neuropathy ([address] E11.40    HSV-2 infection B00.9    Breast cancer screening Z12.39    Cervical cancer screening Z12.4    Obesity (BMI 30.0-34. 9) E66.9    Shingles rash B02.9    S/P excision of lipoma Z98.890, Z86.018    Severe obesity (HCC) E66.01    Infection of skin due to methicillin resistant Staphylococcus aureus (MRSA) L08.9, B95.62    S/P colonoscopy Z98.890    Right axillary hidradenitis L73.2     Past Medical History:   Diagnosis Date    Diabetes ([address] 1995    Genital herpes simplex type 2     GERD (gastroesophageal reflux disease)     no medication prescribed    Hypercholesterolemia     Hypertension     Family Member: Mother Narcolepsy     Neuropathy in diabetes ([facility]     Obesity (BMI 30.0-34. 9)       Past Surgical History:   Procedure Laterality Date    COLONOSCOPY N/A 2019    COLONOSCOPY performed by Abigail Harris MD at [facility] 34  03/15/1979    HX  SECTION  1990    HX KNEE REPLACEMENT Bilateral 2005    HX LIPOMA RESECTION Right 2019    Right buttock.  HX MOHS PROCEDURES Right     HX ROTATOR CUFF REPAIR Right     NH EXTRAC ERUPTED TOOTH/EXPOSED ROOT Right 14    3 UPPER RIGHT SIDE BACK TEETH     Current Outpatient Medications   Medication Sig Dispense Refill    varicella-zoster recombinant, PF, (Shingrix, PF,) 50 mcg/0.5 mL susr injection 0.5mL by IntraMUSCular route once now and then repeat in 2-6 months 0.5 mL 1    amLODIPine-benazepril (LOTREL) 10-20 mg per capsule Take 1 Capsule by mouth daily. 90 Capsule 3    hydroCHLOROthiazide (HYDRODIURIL) 25 mg tablet TAKE 1 TABLET BY MOUTH DAILY 90 Tablet 3    cyanocobalamin (Vitamin B-12) 1,000 mcg tablet Take 1,000 mcg by mouth daily.  metFORMIN (GLUCOPHAGE) 1,000 mg tablet Take 1 Tablet by mouth two (2) times daily (with meals). 180 Tablet 3    lancets (Accu-Chek Fastclix Lancet Drum) misc Test once daily Dx Code: E11.65 100 Each 3    glucose blood VI test strips (Accu-Chek Laurel Plus test strp) strip Test once daily Dx Code: E11.65 100 Strip 3    glipiZIDE (GLUCOTROL) 10 mg tablet TAKE 1 TABLET BY MOUTH TWICE DAILY 180 Tablet 3    dulaglutide (Trulicity) 3 WW/8.2 mL pnij 3 mg by SubCUTAneous route every seven (7) days. Stop 1.5 mg 12 Each 3    ondansetron (ZOFRAN ODT) 4 mg disintegrating tablet Take 1 Tablet by mouth every eight (8) hours as needed for Nausea or Vomiting.  (Patient not taking: Reported on 2022) 20 Tablet 0    varicella-zoster recombinant, PF, (Shingrix, PF,) 50 mcg/0.5 mL susr injection 0.5mL by IntraMUSCular route once now and then repeat in 2-6 months (Patient not taking: Reported on 5/20/2021) 0.5 mL 1    valACYclovir (VALTREX) 1 gram tablet Take 1 Tab by mouth two (2) times a day. (Patient taking differently: Take 1,000 mg by mouth as needed.) 20 Tab 5    nystatin (MYCOSTATIN) topical cream Apply  to affected area two (2) times a day. 60 g 1    rosuvastatin (CRESTOR) 10 mg tablet TAKE 1 TABLET BY MOUTH DAILY 90 Tab 3    traMADoL (ULTRAM) 50 mg tablet Take 50 mg by mouth three (3) times daily.  pregabalin (LYRICA) 100 mg capsule Take 100 mg by mouth three (3) times daily.  ammonium lactate (LAC-HYDRIN) 12 % lotion JEMAL EXT TO ALL SURFACES OF BOTH FEET BID      Blood-Glucose Meter (ACCU-CHEK GEORGINA PLUS METER) misc Test once daily Dx Code: E11.65 1 Each 0    peg 400-propylene glycol (SYSTANE, PROPYLENE GLYCOL,) 0.4-0.3 % drop Administer 1 Drop to both eyes as needed.  omega-3 fatty acids-vitamin e (FISH OIL) 1,000 mg Cap Take 1 Cap by mouth daily. Allergies   Allergen Reactions    Aspirin Hives     Tolerates ibuprofen and naproxen    Clindamycin Rash and Hives       Family History   Problem Relation Age of Onset    Diabetes Mother     Heart Disease Mother     Hypertension Mother    [facility] Arthritis-rheumatoid Mother     Diabetes Father     Heart Disease Father     Cancer Maternal Aunt         bone    Diabetes Sister      Social History     Tobacco Use    Smoking status: Never Smoker    Smokeless tobacco: Never Used   Substance Use Topics    Alcohol use: Yes     Alcohol/week: 0.0 - 1.0 standard drinks       Khushi Hussein, was evaluated through a synchronous (real-time) audio-video encounter. The patient (or guardian if applicable) is aware that this is a billable service, which includes applicable co-pays. This Virtual Visit was conducted with patient's (and/or legal guardian's) consent.  The visit was conducted pursuant to the emergency declaration under the [facility] waiver authority and the Coronavirus Preparedness and Response Supplemental Appropriations Act. Patient identification was verified, and a caregiver was present when appropriate. The patient was located at: Home: [address] [facility] [phone]  The provider was located at:  Facility (Appt Department): [address] [place] [address]       Kathy Yin MD

## 2022-05-25 NOTE — ACP (ADVANCE CARE PLANNING)
Advance Care Planning     Advance Care Planning (ACP) Physician/NP/PA Conversation      Date of Conversation: 5/25/2022  Conducted with: Patient with Decision Making Capacity    Healthcare Decision Maker:   No healthcare decision makers have been documented. Click here to complete [address] including selection of the Healthcare Decision Maker Relationship (ie \"Primary\")      Today we discussed [address]. The patient is considering options. Care Preferences:    Hospitalization: \"If your health worsens and it becomes clear that your chance of recovery is unlikely, what would be your preference regarding hospitalization? \"  The patient would prefer hospitalization. Ventilation: \"If you were unable to breathe on your own and your chance of recovery was unlikely, what would be your preference about the use of a ventilator (breathing machine) if it was available to you? \"   The patient would desire the use of a ventilator. Resuscitation: \"In the event your heart stopped as a result of an underlying serious health condition, would you want attempts to be made to restart your heart, or would you prefer a natural death? \"   Yes, attempt to resuscitate.     Additional topics discussed: treatment goals and benefit/burden of treatment options    Conversation Outcomes / Follow-Up Plan:   ACP in process - information provided, considering goals and options  Reviewed DNR/DNI and patient elects Full Code (Attempt Resuscitation)     Length of Voluntary ACP Conversation in minutes:  <16 minutes (Non-Billable)    Karie Donald MD

## 2022-05-26 ENCOUNTER — OFFICE VISIT (OUTPATIENT)
Dept: ENDOCRINOLOGY | Age: 67
End: 2022-05-26
Payer: MEDICARE

## 2022-05-26 VITALS
RESPIRATION RATE: 18 BRPM | HEART RATE: 80 BPM | HEIGHT: 63 IN | DIASTOLIC BLOOD PRESSURE: 67 MMHG | BODY MASS INDEX: 36.14 KG/M2 | WEIGHT: 204 LBS | TEMPERATURE: 96.9 F | OXYGEN SATURATION: 97 % | SYSTOLIC BLOOD PRESSURE: 166 MMHG

## 2022-05-26 DIAGNOSIS — I10 PRIMARY HYPERTENSION: ICD-10-CM

## 2022-05-26 DIAGNOSIS — E78.00 HYPERCHOLESTEROLEMIA: ICD-10-CM

## 2022-05-26 DIAGNOSIS — N17.9 AKI (ACUTE KIDNEY INJURY) (HCC): ICD-10-CM

## 2022-05-26 DIAGNOSIS — E11.65 TYPE 2 DIABETES MELLITUS WITH HYPERGLYCEMIA, WITHOUT LONG-TERM CURRENT USE OF INSULIN (HCC): Primary | ICD-10-CM

## 2022-05-26 PROCEDURE — G8417 CALC BMI ABV UP PARAM F/U: HCPCS | Performed by: INTERNAL MEDICINE

## 2022-05-26 PROCEDURE — 3052F HG A1C>EQUAL 8.0%<EQUAL 9.0%: CPT | Performed by: INTERNAL MEDICINE

## 2022-05-26 PROCEDURE — 1090F PRES/ABSN URINE INCON ASSESS: CPT | Performed by: INTERNAL MEDICINE

## 2022-05-26 PROCEDURE — 1123F ACP DISCUSS/DSCN MKR DOCD: CPT | Performed by: INTERNAL MEDICINE

## 2022-05-26 PROCEDURE — 99214 OFFICE O/P EST MOD 30 MIN: CPT | Performed by: INTERNAL MEDICINE

## 2022-05-26 PROCEDURE — G8753 SYS BP > OR = 140: HCPCS | Performed by: INTERNAL MEDICINE

## 2022-05-26 PROCEDURE — G8399 PT W/DXA RESULTS DOCUMENT: HCPCS | Performed by: INTERNAL MEDICINE

## 2022-05-26 PROCEDURE — 3017F COLORECTAL CA SCREEN DOC REV: CPT | Performed by: INTERNAL MEDICINE

## 2022-05-26 PROCEDURE — 2022F DILAT RTA XM EVC RTNOPTHY: CPT | Performed by: INTERNAL MEDICINE

## 2022-05-26 PROCEDURE — 1101F PT FALLS ASSESS-DOCD LE1/YR: CPT | Performed by: INTERNAL MEDICINE

## 2022-05-26 PROCEDURE — G8432 DEP SCR NOT DOC, RNG: HCPCS | Performed by: INTERNAL MEDICINE

## 2022-05-26 PROCEDURE — G8536 NO DOC ELDER MAL SCRN: HCPCS | Performed by: INTERNAL MEDICINE

## 2022-05-26 PROCEDURE — G9899 SCRN MAM PERF RSLTS DOC: HCPCS | Performed by: INTERNAL MEDICINE

## 2022-05-26 PROCEDURE — G8427 DOCREV CUR MEDS BY ELIG CLIN: HCPCS | Performed by: INTERNAL MEDICINE

## 2022-05-26 PROCEDURE — G8754 DIAS BP LESS 90: HCPCS | Performed by: INTERNAL MEDICINE

## 2022-05-26 RX ORDER — GLIPIZIDE 10 MG/1
TABLET ORAL
Qty: 180 TABLET | Refills: 3 | Status: SHIPPED | OUTPATIENT
Start: 2022-05-26

## 2022-05-26 RX ORDER — DULAGLUTIDE 3 MG/.5ML
3 INJECTION, SOLUTION SUBCUTANEOUS
Qty: 12 EACH | Refills: 3 | Status: SHIPPED | OUTPATIENT
Start: 2022-05-26

## 2022-05-26 RX ORDER — BETAMETHASONE DIPROPIONATE 0.5 MG/G
CREAM TOPICAL
COMMUNITY
Start: 2022-05-19

## 2022-05-26 RX ORDER — METFORMIN HYDROCHLORIDE 1000 MG/1
1000 TABLET ORAL DAILY
Qty: 180 TABLET | Refills: 3 | Status: SHIPPED | OUTPATIENT
Start: 2022-05-26

## 2022-05-26 NOTE — LETTER
5/26/2022    Patient: Renee Bosch   YOB: 1955   Date of Visit: 5/26/2022     Kathy Yin MD  [address]  [address] [facility] [address]    Dear Kathy Yin MD,      Thank you for referring Ms. Deniz Hansen to [address] [place] [address] for evaluation. My notes for this consultation are attached. If you have questions, please do not hesitate to call me. I look forward to following your patient along with you.       Sincerely,    Aiyana Lima MD

## 2022-05-26 NOTE — PROGRESS NOTES
Juan Jose Mabry is a 77 y.o. female here for   Chief Complaint   Patient presents with    Diabetes       1. Have you been to the ER, urgent care clinic since your last visit? Hospitalized since your last visit? -no    2. Have you seen or consulted any other health care providers outside of the [address] [place] [address] since your last visit?   Include any pap smears or colon screening.-podiatry
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
## 2022-06-15 ENCOUNTER — OFFICE VISIT (OUTPATIENT)
Dept: FAMILY MEDICINE CLINIC | Age: 67
End: 2022-06-15
Payer: MEDICARE

## 2022-06-15 VITALS
DIASTOLIC BLOOD PRESSURE: 72 MMHG | SYSTOLIC BLOOD PRESSURE: 130 MMHG | BODY MASS INDEX: 36.14 KG/M2 | HEART RATE: 85 BPM | HEIGHT: 63 IN | WEIGHT: 204 LBS | TEMPERATURE: 98.5 F | OXYGEN SATURATION: 95 % | RESPIRATION RATE: 16 BRPM

## 2022-06-15 DIAGNOSIS — L30.4 INTERTRIGO: ICD-10-CM

## 2022-06-15 DIAGNOSIS — R79.89 ELEVATED SERUM CREATININE: ICD-10-CM

## 2022-06-15 DIAGNOSIS — H91.93 HEARING PROBLEM OF BOTH EARS: Primary | ICD-10-CM

## 2022-06-15 DIAGNOSIS — H61.23 BILATERAL IMPACTED CERUMEN: ICD-10-CM

## 2022-06-15 PROCEDURE — G8399 PT W/DXA RESULTS DOCUMENT: HCPCS | Performed by: FAMILY MEDICINE

## 2022-06-15 PROCEDURE — G8417 CALC BMI ABV UP PARAM F/U: HCPCS | Performed by: FAMILY MEDICINE

## 2022-06-15 PROCEDURE — G8754 DIAS BP LESS 90: HCPCS | Performed by: FAMILY MEDICINE

## 2022-06-15 PROCEDURE — G8432 DEP SCR NOT DOC, RNG: HCPCS | Performed by: FAMILY MEDICINE

## 2022-06-15 PROCEDURE — G8427 DOCREV CUR MEDS BY ELIG CLIN: HCPCS | Performed by: FAMILY MEDICINE

## 2022-06-15 PROCEDURE — 69210 REMOVE IMPACTED EAR WAX UNI: CPT | Performed by: FAMILY MEDICINE

## 2022-06-15 PROCEDURE — G9899 SCRN MAM PERF RSLTS DOC: HCPCS | Performed by: FAMILY MEDICINE

## 2022-06-15 PROCEDURE — 1101F PT FALLS ASSESS-DOCD LE1/YR: CPT | Performed by: FAMILY MEDICINE

## 2022-06-15 PROCEDURE — 99213 OFFICE O/P EST LOW 20 MIN: CPT | Performed by: FAMILY MEDICINE

## 2022-06-15 PROCEDURE — G8752 SYS BP LESS 140: HCPCS | Performed by: FAMILY MEDICINE

## 2022-06-15 PROCEDURE — 1090F PRES/ABSN URINE INCON ASSESS: CPT | Performed by: FAMILY MEDICINE

## 2022-06-15 PROCEDURE — 3017F COLORECTAL CA SCREEN DOC REV: CPT | Performed by: FAMILY MEDICINE

## 2022-06-15 PROCEDURE — 1123F ACP DISCUSS/DSCN MKR DOCD: CPT | Performed by: FAMILY MEDICINE

## 2022-06-15 PROCEDURE — G8536 NO DOC ELDER MAL SCRN: HCPCS | Performed by: FAMILY MEDICINE

## 2022-06-15 NOTE — PATIENT INSTRUCTIONS
Yeast Skin Infection: Care Instructions  Your Care Instructions     Yeast normally lives on your skin. Sometimes too much yeast can overgrow in certain areas of the skin and cause an infection. The infection causes red, scaly, moist patches on your skin that may itch. Common areas for skin yeast infections are skin folds under the breasts or belly area. The warm and moist areas in the skin folds can make it easier for yeast to overgrow. Yeast infections also can be found on other parts of the body such as the groin or armpits. You will probably get a cream or ointment that contains an antifungal medicine. Examples of these are miconazole and clotrimazole. You put it on your skin to treat the infection. Your doctor may give you a prescription for the cream or ointment. Or you may be able to buy it without a prescription at most drugstores. If the infection is severe, the doctor will prescribe antifungal pills. A yeast infection usually goes away after about a week of treatment. But it's important to use the medicine for as long as your doctor tells you to. Follow-up care is a key part of your treatment and safety. Be sure to make and go to all appointments, and call your doctor if you are having problems. It's also a good idea to know your test results and keep a list of the medicines you take. How can you care for yourself at home? · Be safe with medicines. Take your medicines exactly as prescribed. Call your doctor if you think you are having a problem with your medicine. · Keep your skin clean and dry. Your doctor may suggest using powder that contains an antifungal medicine in the skin folds. · Wear loose clothing. When should you call for help? Call your doctor now or seek immediate medical care if:    · You have symptoms of infection, such as:  ? Increased pain, swelling, warmth, or redness. ? Red streaks leading from the area. ? Pus draining from the area. ? A fever.    Watch closely for changes in your health, and be sure to contact your doctor if:    · You do not get better as expected. Where can you learn more? Go to http://www.gray.com/  Enter A142 in the search box to learn more about \"Yeast Skin Infection: Care Instructions. \"  Current as of: November 15, 2021               Content Version: 13.2  © 7294-9964 [employer]. Care instructions adapted under license by [employer] (which disclaims liability or warranty for this information). If you have questions about a medical condition or this instruction, always ask your healthcare professional. [address] any warranty or liability for your use of this information.

## 2022-06-15 NOTE — PROGRESS NOTES
Yenny Ordonez is a 77 y.o. female    Chief Complaint   Patient presents with    Labs     Patient states that she needs the doctor to reorder a metabolic panel because her diabetes doctor requested her to get that done again. Patient is unsure of the reason for today's appountment. No ther concerns. 1. Have you been to the ER, urgent care clinic since your last visit? Hospitalized since your last visit? No  2. Have you seen or consulted any other health care providers outside of the [address] [place] [address] since your last visit? Include any pap smears or colon screening. No      Visit Vitals  /72 (BP 1 Location: Right upper arm, BP Patient Position: Sitting)   Pulse 85   Temp 98.5 °F (36.9 °C) (Oral)   Resp 16   Ht 5' 3\" (1.6 m)   Wt 204 lb (92.5 kg)   SpO2 95%   BMI 36.14 kg/m²           Health Maintenance Due   Topic Date Due    Shingrix Vaccine Age 49> (1 of 2) Never done    Pneumococcal 65+ years (2 - PCV) 12/13/2017    COVID-19 Vaccine (3 - Booster for Pfizer series) 12/15/2021    Foot Exam Q1  02/10/2022         Medication Reconciliation completed, changes noted.   Please  Update medication list.

## 2022-06-15 NOTE — PROGRESS NOTES
Subjective:   Soraya Chaparro is an 77 y.o. female who presents for  Evaluation of hearing problem    HPI  Chief Complaint   Patient presents with    Labs     Patient states that she needs the doctor to reorder a metabolic panel because her diabetes doctor requested her to get that done again. Patient is unsure of the reason for today's appountment. No ther concerns. 1. Hearing problem  She states that for several months she has been having trouble understanding when people are talking to her and she always needs to ask them twice. She has no trouble understanding voice while watching TV. Denies any pain or discomfort in the ear. 2. Intertrigo   On the abdomen and under the breasts bilaterally. Comes and goes. Allergies - reviewed: Allergies   Allergen Reactions    Aspirin Hives     Tolerates ibuprofen and naproxen    Clindamycin Rash and Hives         Medications - reviewed:  Current Outpatient Medications   Medication Sig    augmented betamethasone dipropionate (DIPROLENE-AF) 0.05 % topical cream APPLY TO AFFECTED AREA TWICE DAILY THEN WASH HANDS    glipiZIDE (GLUCOTROL) 10 mg tablet TAKE 1 TABLET BY MOUTH TWICE DAILY    metFORMIN (GLUCOPHAGE) 1,000 mg tablet Take 1 Tablet by mouth daily.  dulaglutide (Trulicity) 3 AW/8.4 mL pnij 3 mg by SubCUTAneous route every seven (7) days. Stop 1.5 mg    amLODIPine-benazepril (LOTREL) 10-20 mg per capsule Take 1 Capsule by mouth daily.  hydroCHLOROthiazide (HYDRODIURIL) 25 mg tablet TAKE 1 TABLET BY MOUTH DAILY    cyanocobalamin (Vitamin B-12) 1,000 mcg tablet Take 1,000 mcg by mouth daily.  lancets (Accu-Chek Fastclix Lancet Drum) misc Test once daily Dx Code: E11.65    glucose blood VI test strips (Accu-Chek Laurel Plus test strp) strip Test once daily Dx Code: E11.65    valACYclovir (VALTREX) 1 gram tablet Take 1 Tab by mouth two (2) times a day.  nystatin (MYCOSTATIN) topical cream Apply  to affected area two (2) times a day.     rosuvastatin (CRESTOR) 10 mg tablet TAKE 1 TABLET BY MOUTH DAILY    traMADoL (ULTRAM) 50 mg tablet Take 50 mg by mouth three (3) times daily.  ammonium lactate (LAC-HYDRIN) 12 % lotion JEMAL EXT TO ALL SURFACES OF BOTH FEET BID    Blood-Glucose Meter (ACCU-CHEK GEORGINA PLUS METER) misc Test once daily Dx Code: E11.65    peg 400-propylene glycol (SYSTANE, PROPYLENE GLYCOL,) 0.4-0.3 % drop Administer 1 Drop to both eyes as needed.  omega-3 fatty acids-vitamin e (FISH OIL) 1,000 mg Cap Take 1 Cap by mouth daily.  pregabalin (LYRICA) 100 mg capsule Take 100 mg by mouth three (3) times daily. No current facility-administered medications for this visit. Past Medical History - reviewed:  Past Medical History:   Diagnosis Date    Diabetes ([facility] 1995    Genital herpes simplex type 2     GERD (gastroesophageal reflux disease)     no medication prescribed    Hypercholesterolemia     Hypertension     Narcolepsy     Neuropathy in diabetes ([facility]     Obesity (BMI 30.0-34. 9)            Review of Systems   CONSTITUTIONAL: denies fever. Denies chills. ENT: +hearing problem. denies sinus congestion. Denies sinus drainage  CARDIOVASCULAR: denies chest pain. Denies palpitations  RESPIRATORY: denies shortness of breath  SKIN: +rash and wetness in the skin fold         Objective:     Visit Vitals  /72 (BP 1 Location: Right upper arm, BP Patient Position: Sitting)   Pulse 85   Temp 98.5 °F (36.9 °C) (Oral)   Resp 16   Ht 5' 3\" (1.6 m)   Wt 204 lb (92.5 kg)   LMP 01/01/2008 (LMP Unknown)   SpO2 95%   BMI 36.14 kg/m²       General appearance - alert, well appearing, and in no distress  Ears - Both ear canals impacted with the wax bilaterally, s/p manual wax removal. TM are WNL bilaterally, external era canals are WNL BL.    Nose - normal and patent, no erythema, discharge or polyps  Mouth - mucous membranes moist, pharynx normal without lesions  Neck - supple, no significant adenopathy  Chest - clear to auscultation, no wheezes, rales or rhonchi, symmetric air entry  Heart - normal rate, regular rhythm, normal S1, S2, no murmurs, rubs, clicks or gallops  Skin - +Minimally erythematous wet skin in the abdominal fold and under the breast BL. No oozing or bleeding. Assessment:   Jose Garcia is a 77 y.o. female who was seen today for:    ICD-10-CM ICD-9-CM    1. Hearing problem of both ears  H91.93 V41.2 REFERRAL TO ENT-OTOLARYNGOLOGY   2. Elevated serum creatinine  C45.31 963.97 METABOLIC PANEL, BASIC      METABOLIC PANEL, BASIC   3. Bilateral impacted cerumen  H61.23 380.4 REMOVE IMPACTED EAR WAX   4. Intertrigo  L30.4 695.89            Plan:   1. Hearing problem. Possibly 2/2 ear wax impaction. The pt will monitor the hearing after disimpaction. If persistent problem will need ENT/audiology eval.  2. Intertrigo. Advised to use baby powder to keep the skin dry, can use Nystatin powder/cream prn.  3. Elevated Creatine. Noted in the lab work form endocrinology. Checking BMP today. Paperwork for ACP was given to the patient. Follow-up and Dispositions    · Return in about 6 months (around 12/15/2022) for chornic medical conditions follow up. I have discussed the diagnosis with the patient and the intended plan as seen in the above orders. The patient has received an after-visit summary and questions were answered concerning future plans. I have discussed medication side effects and warnings with the patient as well. Informed pt to return to the office if new symptoms arise.       Servando Duarte MD  Family Medicine Attending Physician

## 2022-06-16 LAB
BUN SERPL-MCNC: 30 MG/DL (ref 8–27)
BUN/CREAT SERPL: 16 (ref 12–28)
CALCIUM SERPL-MCNC: 9.3 MG/DL (ref 8.7–10.3)
CHLORIDE SERPL-SCNC: 103 MMOL/L (ref 96–106)
CO2 SERPL-SCNC: 21 MMOL/L (ref 20–29)
CREAT SERPL-MCNC: 1.91 MG/DL (ref 0.57–1)
EGFR: 29 ML/MIN/1.73
GLUCOSE SERPL-MCNC: 98 MG/DL (ref 65–99)
INTERPRETATION: NORMAL
POTASSIUM SERPL-SCNC: 4 MMOL/L (ref 3.5–5.2)
SODIUM SERPL-SCNC: 142 MMOL/L (ref 134–144)

## 2022-06-16 NOTE — PROGRESS NOTES
Creatinine is improved but remains elevated. Will discuss referral to nephrologist in the setting of known diabetes.  May consider adding Jardiance but defer to endocrinologist since diabetes is managed by endocrinologist.

## 2022-06-21 ENCOUNTER — TELEPHONE (OUTPATIENT)
Dept: FAMILY MEDICINE CLINIC | Age: 67
End: 2022-06-21

## 2022-06-21 DIAGNOSIS — H91.93 HEARING PROBLEM OF BOTH EARS: Primary | ICD-10-CM

## 2022-06-21 DIAGNOSIS — R79.89 ELEVATED SERUM CREATININE: ICD-10-CM

## 2022-06-21 DIAGNOSIS — E11.65 TYPE 2 DIABETES MELLITUS WITH HYPERGLYCEMIA, UNSPECIFIED WHETHER LONG TERM INSULIN USE (HCC): ICD-10-CM

## 2022-06-21 NOTE — TELEPHONE ENCOUNTER
Called the patient at [phone] to discuss the lab results. 1. Will refer to nephrologist and the pt agreed. 2. The patient continues to have hearing problem so will refer to ENT. 3. She is having intermittent pain in the left hip which is not responding to Tramadol. Previously seen by  (Dr. Mina Butt) , s/p injection. Will send a message to the schedulers.    5:49 PM  6/21/2022  Frde Blanca MD

## 2022-07-21 ENCOUNTER — OFFICE VISIT (OUTPATIENT)
Dept: FAMILY MEDICINE CLINIC | Age: 67
End: 2022-07-21
Payer: MEDICARE

## 2022-07-21 VITALS
WEIGHT: 204 LBS | OXYGEN SATURATION: 94 % | DIASTOLIC BLOOD PRESSURE: 70 MMHG | HEART RATE: 89 BPM | HEIGHT: 63 IN | SYSTOLIC BLOOD PRESSURE: 110 MMHG | BODY MASS INDEX: 36.14 KG/M2 | RESPIRATION RATE: 20 BRPM

## 2022-07-21 DIAGNOSIS — M25.552 HIP PAIN, ACUTE, LEFT: Primary | ICD-10-CM

## 2022-07-21 DIAGNOSIS — M16.12 ARTHRITIS OF LEFT HIP: ICD-10-CM

## 2022-07-21 PROBLEM — N18.4 CKD (CHRONIC KIDNEY DISEASE) STAGE 4, GFR 15-29 ML/MIN (HCC): Status: ACTIVE | Noted: 2022-07-21

## 2022-07-21 PROBLEM — N18.30 CHRONIC RENAL DISEASE, STAGE III (HCC): Status: ACTIVE | Noted: 2022-07-21

## 2022-07-21 PROCEDURE — G8432 DEP SCR NOT DOC, RNG: HCPCS | Performed by: FAMILY MEDICINE

## 2022-07-21 PROCEDURE — 1101F PT FALLS ASSESS-DOCD LE1/YR: CPT | Performed by: FAMILY MEDICINE

## 2022-07-21 PROCEDURE — G8417 CALC BMI ABV UP PARAM F/U: HCPCS | Performed by: FAMILY MEDICINE

## 2022-07-21 PROCEDURE — 1123F ACP DISCUSS/DSCN MKR DOCD: CPT | Performed by: FAMILY MEDICINE

## 2022-07-21 PROCEDURE — 3017F COLORECTAL CA SCREEN DOC REV: CPT | Performed by: FAMILY MEDICINE

## 2022-07-21 PROCEDURE — G8427 DOCREV CUR MEDS BY ELIG CLIN: HCPCS | Performed by: FAMILY MEDICINE

## 2022-07-21 PROCEDURE — 99214 OFFICE O/P EST MOD 30 MIN: CPT | Performed by: FAMILY MEDICINE

## 2022-07-21 PROCEDURE — G8752 SYS BP LESS 140: HCPCS | Performed by: FAMILY MEDICINE

## 2022-07-21 PROCEDURE — G8754 DIAS BP LESS 90: HCPCS | Performed by: FAMILY MEDICINE

## 2022-07-21 PROCEDURE — G8536 NO DOC ELDER MAL SCRN: HCPCS | Performed by: FAMILY MEDICINE

## 2022-07-21 PROCEDURE — 1090F PRES/ABSN URINE INCON ASSESS: CPT | Performed by: FAMILY MEDICINE

## 2022-07-21 PROCEDURE — G8399 PT W/DXA RESULTS DOCUMENT: HCPCS | Performed by: FAMILY MEDICINE

## 2022-07-21 PROCEDURE — G9899 SCRN MAM PERF RSLTS DOC: HCPCS | Performed by: FAMILY MEDICINE

## 2022-07-21 PROCEDURE — 20611 DRAIN/INJ JOINT/BURSA W/US: CPT | Performed by: FAMILY MEDICINE

## 2022-07-21 RX ORDER — LIDOCAINE HYDROCHLORIDE 10 MG/ML
3 INJECTION INFILTRATION; PERINEURAL ONCE
Qty: 3 ML | Refills: 0
Start: 2022-07-21 | End: 2022-07-21

## 2022-07-21 RX ORDER — TRIAMCINOLONE ACETONIDE 40 MG/ML
40 INJECTION, SUSPENSION INTRA-ARTICULAR; INTRAMUSCULAR ONCE
Qty: 1 ML | Refills: 0
Start: 2022-07-21 | End: 2022-07-21

## 2022-07-21 NOTE — PROGRESS NOTES
[id number] [facility] Sports Medicine      Chief Complaint:   Chief Complaint   Patient presents with    Back Pain    Hip Pain     SUBJECTIVE:  Antonella Lee is a 77 y.o. female who presents for hip injection in setting of left hip pain. Thought had injection to this hip 2 yrs ago, though per records did not have injection to left hip. Only started hurting last month. Pain varies, mostly sharp when starts walking, no pain when sitting. Currently 0/10 (sitting), 8/10 today when walking. Tramadol is not very helpful. Denies falls, weakness. Current Medications: Tramadol (for back pain)     Physical Therapy: Tried 3 times without help    PMHx  Past Medical History:   Diagnosis Date    Diabetes ([facility] [address] 1995    Genital herpes simplex type 2     GERD (gastroesophageal reflux disease)     no medication prescribed    Hypercholesterolemia     Hypertension     Narcolepsy     Neuropathy in diabetes ([facility] [address]     Obesity (BMI 30.0-34. 9)        Meds:   Current Outpatient Medications   Medication Sig Dispense Refill    triamcinolone acetonide (Kenalog) 40 mg/mL injection 1 mL by IntraBURSal route once for 1 dose. 1 mL 0    lidocaine (XYLOCAINE) 10 mg/mL (1 %) injection 3 mL by IntraBURSal route once for 1 dose. 3 mL 0    augmented betamethasone dipropionate (DIPROLENE-AF) 0.05 % topical cream APPLY TO AFFECTED AREA TWICE DAILY THEN WASH HANDS      glipiZIDE (GLUCOTROL) 10 mg tablet TAKE 1 TABLET BY MOUTH TWICE DAILY 180 Tablet 3    metFORMIN (GLUCOPHAGE) 1,000 mg tablet Take 1 Tablet by mouth daily. 180 Tablet 3    dulaglutide (Trulicity) 3 MS/6.8 mL pnij 3 mg by SubCUTAneous route every seven (7) days. Stop 1.5 mg 12 Each 3    amLODIPine-benazepril (LOTREL) 10-20 mg per capsule Take 1 Capsule by mouth daily.  90 Capsule 3    hydroCHLOROthiazide (HYDRODIURIL) 25 mg tablet TAKE 1 TABLET BY MOUTH DAILY 90 Tablet 3    cyanocobalamin 1,000 mcg tablet Take 1,000 mcg by mouth daily. lancets (Accu-Chek Fastclix Lancet Drum) misc Test once daily Dx Code: E11.65 100 Each 3    glucose blood VI test strips (Accu-Chek Georgina Plus test strp) strip Test once daily Dx Code: E11.65 100 Strip 3    valACYclovir (VALTREX) 1 gram tablet Take 1 Tab by mouth two (2) times a day. 20 Tab 5    nystatin (MYCOSTATIN) topical cream Apply  to affected area two (2) times a day. 60 g 1    rosuvastatin (CRESTOR) 10 mg tablet TAKE 1 TABLET BY MOUTH DAILY 90 Tab 3    traMADoL (ULTRAM) 50 mg tablet Take 50 mg by mouth three (3) times daily. pregabalin (LYRICA) 100 mg capsule Take 100 mg by mouth three (3) times daily. ammonium lactate (LAC-HYDRIN) 12 % lotion JEMAL EXT TO ALL SURFACES OF BOTH FEET BID      Blood-Glucose Meter (ACCU-CHEK GEORGINA PLUS METER) misc Test once daily Dx Code: E11.65 1 Each 0    peg 400-propylene glycol (SYSTANE) 0.4-0.3 % drop Administer 1 Drop to both eyes as needed. omega-3 fatty acids-vitamin e 1,000 mg cap Take 1 Cap by mouth daily. Allergies:    Allergies   Allergen Reactions    Aspirin Hives     Tolerates ibuprofen and naproxen    Clindamycin Rash and Hives       Smoker:  Social History     Tobacco Use   Smoking Status Never   Smokeless Tobacco Never       ETOH:   Social History     Substance and Sexual Activity   Alcohol Use Yes    Alcohol/week: 0.0 - 1.0 standard drinks       FH:   Family History   Problem Relation Age of Onset    Diabetes Mother     Heart Disease Mother     Hypertension Mother     Arthritis-rheumatoid Mother     Diabetes Father     Heart Disease Father     Cancer Maternal Aunt         bone    Diabetes Sister        ROS:  General/Constitutional:  Chronic Headache      Neck:   No pain, or limited movement     Cardiac:    No chest pain      Respiratory:   No cough or shortness of breath     GI:   No nausea/vomiting, diarrhea, abdominal pain       :   No dysuria or  hematuria    Neurological:   No problems with balance, or unilateral weakness; hx peripheral neuropathy   Skin: No rash     Physical Exam:  Visit Vitals  /70 (BP 1 Location: Right arm, BP Patient Position: Sitting, BP Cuff Size: Large adult)   Pulse 89   Resp 20   Ht 5' 3\" (1.6 m)   Wt 204 lb (92.5 kg)   LMP 01/01/2008 (LMP Unknown)   SpO2 94%   BMI 36.14 kg/m²       General: NAD, talkative, friendly   Respiratory: Normal breathing, no SOB with talking   Cardiovascular: Pulses regular; has peripheral edema   Skin: No acute skin changes  Psych: Normal Affect    Exam of left hip  Very difficult for patient to get out of chair without pain; painful to climb on exam table  Pain to palpation around lateral hip  Pain with internal rotation; uncomfortable to lay down, tried seated scour, though did not cause pain; seated figure four difficult; declined supine exam d/t pain   Pain limited hip flexion on the left; full strength in leg flexion and extension, dorsi and plantar flexion   Sensation intact     Imaging: Left hip and pelvis XR from 5/20/21 independently reviewed and interpreted; mild OA of left hip joint     Assessment:      ICD-10-CM ICD-9-CM    1. Hip pain, acute, left  M25.552 719.45       2.  Arthritis of left hip  M16.12 716.95 AMB POS US DRAIN/INJECT LARGE JOINT/BURSA      TRIAMCINOLONE ACETONIDE INJ      triamcinolone acetonide (Kenalog) 40 mg/mL injection      lidocaine (XYLOCAINE) 10 mg/mL (1 %) injection        Plan:  - Options discussed   - Left hip injection under ultrasound guidance, pain improved from 8 to 5 after injection   - RTC if no relief in the next month     Seen and discussed with attending Dr. Vinay Mayers.     Saman Carter DO  Sports Medicine Fellow       [address] [facility]  OFFICE PROCEDURE PROGRESS NOTE        Chart reviewed for the following:   Saman BRODERICK DO, have reviewed the History, Physical and updated the Allergic reactions for Tryphena MACIEJ Mariscal     TIME OUT performed immediately prior to start of procedure:   Saman BRODERICK DO, have performed the following reviews on Teofilo Kathy prior to the start of the procedure:            * Patient was identified by name and date of birth   * Agreement on procedure being performed was verified  * Risks and Benefits explained to the patient  * Procedure site verified and marked as necessary  * Patient was positioned for comfort  * Consent was signed and verified     Time: 1330    Date of procedure: 7/21/2022    Procedure performed by:  Grover Moffett DO    Provider assisted by: Dr Nate Yarbrough MD    Patient assisted by: self    Indications:   Left hip pain in setting of osteoarthritis     Procedure:  After verbal consent was obtained, risks and benefits of the procedure were discussed with the patient and alternatives discussed. Time out performed, cross checking patient ID and procedure. Confirmed that the patient does not have history of prior adverse reactions, active infections, or relevant allergies. There was no effusion, erythema, or warmth, and the skin was clear. The skin was cleaned using chlorhexidine x 2. Topical anesthesia was achieved with ethyl chloride. A 22 gauge 3.5\" needle was inserted into the left hip via a distal to proximal approach. The site was injected with a mixture of 40mg of Kenalog and  3ml 1% Lidocaine. The injection was completed without complication, and a bandage was applied. Patient felt 40% better after injection. After Care Instructions: The patient is asked to continue to rest the joint for a few more days before resuming regular activities. It may be more painful for the first 1-2 days. Watch for fever, or increased swelling or persistent pain in the joint. Call or return to clinic prn if such symptoms occur or there is failure to improve as anticipated.

## 2022-07-21 NOTE — PROGRESS NOTES
Identified Patient with two Patient identifiers (Name and ). Two Patient Identifiers confirmed. Reviewed record in preparation for visit and have obtained necessary documentation. Chief Complaint   Patient presents with    Back Pain    Hip Pain       Visit Vitals  /70 (BP 1 Location: Right arm, BP Patient Position: Sitting, BP Cuff Size: Large adult)   Pulse 89   Resp 20   Ht 5' 3\" (1.6 m)   Wt 204 lb (92.5 kg)   SpO2 94%   BMI 36.14 kg/m²       1. Have you been to the ER, urgent care clinic since your last visit? Hospitalized since your last visit? No    2. Have you seen or consulted any other health care providers outside of the [address] [place] [address] since your last visit? Include any pap smears or colon screening.  No

## 2022-07-22 NOTE — PROGRESS NOTES
[id number] [facility] Sports Medicine      Chief Complaint:   Chief Complaint   Patient presents with    Back Pain    Hip Pain     SUBJECTIVE:  Daniel Tariq is a 77 y.o. female who presents for hip injection in setting of left hip pain. Thought had injection to this hip 2 yrs ago, though per records did not have injection to left hip. Only started hurting last month. Pain varies, mostly sharp when starts walking, no pain when sitting. Currently 0/10 (sitting), 8/10 today when walking. Tramadol is not very helpful. Denies falls, weakness. Current Medications: Tramadol (for back pain)     Physical Therapy: Tried 3 times without help    PMHx  Past Medical History:   Diagnosis Date    Diabetes ([facility] [address] 1995    Genital herpes simplex type 2     GERD (gastroesophageal reflux disease)     no medication prescribed    Hypercholesterolemia     Hypertension     Narcolepsy     Neuropathy in diabetes ([facility]     Obesity (BMI 30.0-34. 9)        Meds:   Current Outpatient Medications   Medication Sig Dispense Refill    triamcinolone acetonide (Kenalog) 40 mg/mL injection 1 mL by IntraBURSal route once for 1 dose. 1 mL 0    lidocaine (XYLOCAINE) 10 mg/mL (1 %) injection 3 mL by IntraBURSal route once for 1 dose. 3 mL 0    augmented betamethasone dipropionate (DIPROLENE-AF) 0.05 % topical cream APPLY TO AFFECTED AREA TWICE DAILY THEN WASH HANDS      glipiZIDE (GLUCOTROL) 10 mg tablet TAKE 1 TABLET BY MOUTH TWICE DAILY 180 Tablet 3    metFORMIN (GLUCOPHAGE) 1,000 mg tablet Take 1 Tablet by mouth daily. 180 Tablet 3    dulaglutide (Trulicity) 3 PF/1.0 mL pnij 3 mg by SubCUTAneous route every seven (7) days. Stop 1.5 mg 12 Each 3    amLODIPine-benazepril (LOTREL) 10-20 mg per capsule Take 1 Capsule by mouth daily.  90 Capsule 3    hydroCHLOROthiazide (HYDRODIURIL) 25 mg tablet TAKE 1 TABLET BY MOUTH DAILY 90 Tablet 3    cyanocobalamin 1,000 mcg tablet Take 1,000 mcg by mouth daily. lancets (Accu-Chek Fastclix Lancet Drum) misc Test once daily Dx Code: E11.65 100 Each 3    glucose blood VI test strips (Accu-Chek Georgina Plus test strp) strip Test once daily Dx Code: E11.65 100 Strip 3    valACYclovir (VALTREX) 1 gram tablet Take 1 Tab by mouth two (2) times a day. 20 Tab 5    nystatin (MYCOSTATIN) topical cream Apply  to affected area two (2) times a day. 60 g 1    rosuvastatin (CRESTOR) 10 mg tablet TAKE 1 TABLET BY MOUTH DAILY 90 Tab 3    traMADoL (ULTRAM) 50 mg tablet Take 50 mg by mouth three (3) times daily. pregabalin (LYRICA) 100 mg capsule Take 100 mg by mouth three (3) times daily. ammonium lactate (LAC-HYDRIN) 12 % lotion JEMAL EXT TO ALL SURFACES OF BOTH FEET BID      Blood-Glucose Meter (ACCU-CHEK GEORGINA PLUS METER) misc Test once daily Dx Code: E11.65 1 Each 0    peg 400-propylene glycol (SYSTANE) 0.4-0.3 % drop Administer 1 Drop to both eyes as needed. omega-3 fatty acids-vitamin e 1,000 mg cap Take 1 Cap by mouth daily. Allergies:    Allergies   Allergen Reactions    Aspirin Hives     Tolerates ibuprofen and naproxen    Clindamycin Rash and Hives       Smoker:  Social History     Tobacco Use   Smoking Status Never   Smokeless Tobacco Never       ETOH:   Social History     Substance and Sexual Activity   Alcohol Use Yes    Alcohol/week: 0.0 - 1.0 standard drinks       FH:   Family History   Problem Relation Age of Onset    Diabetes Mother     Heart Disease Mother     Hypertension Mother     Arthritis-rheumatoid Mother     Diabetes Father     Heart Disease Father     Cancer Maternal Aunt         bone    Diabetes Sister        ROS:  General/Constitutional:  Chronic Headache      Neck:   No pain, or limited movement     Cardiac:    No chest pain      Respiratory:   No cough or shortness of breath     GI:   No nausea/vomiting, diarrhea, abdominal pain       :   No dysuria or  hematuria    Neurological:   No problems with balance, or unilateral weakness; hx peripheral neuropathy   Skin: No rash     Physical Exam:  Visit Vitals  /70 (BP 1 Location: Right arm, BP Patient Position: Sitting, BP Cuff Size: Large adult)   Pulse 89   Resp 20   Ht 5' 3\" (1.6 m)   Wt 204 lb (92.5 kg)   LMP 01/01/2008 (LMP Unknown)   SpO2 94%   BMI 36.14 kg/m²       General: NAD, talkative, friendly   Respiratory: Normal breathing, no SOB with talking   Cardiovascular: Pulses regular; has peripheral edema   Skin: No acute skin changes  Psych: Normal Affect    Exam of left hip  Very difficult for patient to get out of chair without pain; painful to climb on exam table  Pain to palpation around lateral hip  Pain with internal rotation; uncomfortable to lay down, tried seated scour, though did not cause pain; seated figure four difficult; declined supine exam d/t pain   Pain limited hip flexion on the left; full strength in leg flexion and extension, dorsi and plantar flexion   Sensation intact     Imaging: Left hip and pelvis XR from 5/20/21 independently reviewed and interpreted; mild OA of left hip joint     Assessment:      ICD-10-CM ICD-9-CM    1. Hip pain, acute, left  M25.552 719.45       2. Arthritis of left hip  M16.12 716.95 AMB POS US DRAIN/INJECT LARGE JOINT/BURSA      TRIAMCINOLONE ACETONIDE INJ      triamcinolone acetonide (Kenalog) 40 mg/mL injection      lidocaine (XYLOCAINE) 10 mg/mL (1 %) injection        Plan:  - Options discussed   - Left hip injection under ultrasound guidance, pain improved from 8 to 5 after injection. Does not want to start PT at this time. If pain returns, get XR and start PT.   - RTC if no relief in the next month     Follow-up and Dispositions    Return in about 1 month (around 8/21/2022) for Hip Pain .              [facility]  OFFICE PROCEDURE PROGRESS NOTE        Chart reviewed for the following:   ICyn DO, have reviewed the History, Physical and updated the Allergic reactions for Karlos Kaufman TIME OUT performed immediately prior to start of procedure:   I, Steph Stewart DO, have performed the following reviews on Meena Yanes prior to the start of the procedure:            * Patient was identified by name and date of birth   * Agreement on procedure being performed was verified  * Risks and Benefits explained to the patient  * Procedure site verified and marked as necessary  * Patient was positioned for comfort  * Consent was signed and verified     Time: 3:30 PM    Date of procedure: 7/21/2022    Procedure performed by:  Steph Stewart DO    Provider assisted by: Dr Danny Conrad MD    Patient assisted by: self    Indications:   Left hip pain in setting of osteoarthritis     Procedure:  After verbal consent was obtained, risks and benefits of the procedure were discussed with the patient and alternatives discussed. Time out performed, cross checking patient ID and procedure. Confirmed that the patient does not have history of prior adverse reactions, active infections, or relevant allergies. There was no effusion, erythema, or warmth, and the skin was clear. The skin was cleaned using chlorhexidine x 2. Topical anesthesia was achieved with ethyl chloride. A 22 gauge 3.5\" needle was inserted into the left hip via a distal to proximal approach. The site was injected with a mixture of 40mg of Kenalog and  3ml 1% Lidocaine. The injection was completed without complication, and a bandage was applied. Patient felt 40% better after injection. After Care Instructions: The patient is asked to continue to rest the joint for a few more days before resuming regular activities. It may be more painful for the first 1-2 days. Watch for fever, or increased swelling or persistent pain in the joint. Call or return to clinic prn if such symptoms occur or there is failure to improve as anticipated.      Patient encounter was >30 minutes was spent of total time spent on the date of the encounter: preparing to see the patient(reviewing prior notes and tests), obtaining and/or reviewing separately obtained history, performing a medially appropriate examination and/or evaluation, counseling and educating the patient, ordering procedure, documenting clinical information in the electronic or other health record.

## 2022-08-05 ENCOUNTER — HOSPITAL ENCOUNTER (OUTPATIENT)
Dept: MAMMOGRAPHY | Age: 67
Discharge: HOME | End: 2022-08-05
Attending: FAMILY MEDICINE
Payer: MEDICARE

## 2022-08-05 DIAGNOSIS — Z12.31 ENCOUNTER FOR SCREENING MAMMOGRAM FOR MALIGNANT NEOPLASM OF BREAST: ICD-10-CM

## 2022-08-05 PROCEDURE — 77067 SCR MAMMO BI INCL CAD: CPT

## 2022-08-05 PROCEDURE — 77063 BREAST TOMOSYNTHESIS BI: CPT

## 2022-08-23 ENCOUNTER — OFFICE VISIT (OUTPATIENT)
Dept: FAMILY MEDICINE CLINIC | Age: 67
End: 2022-08-23
Payer: MEDICARE

## 2022-08-23 VITALS
TEMPERATURE: 98.5 F | OXYGEN SATURATION: 95 % | SYSTOLIC BLOOD PRESSURE: 134 MMHG | DIASTOLIC BLOOD PRESSURE: 80 MMHG | BODY MASS INDEX: 35.44 KG/M2 | HEIGHT: 63 IN | RESPIRATION RATE: 18 BRPM | WEIGHT: 200 LBS | HEART RATE: 89 BPM

## 2022-08-23 DIAGNOSIS — M54.2 NECK PAIN: ICD-10-CM

## 2022-08-23 DIAGNOSIS — M16.12 ARTHRITIS OF LEFT HIP: Primary | ICD-10-CM

## 2022-08-23 DIAGNOSIS — M25.552 LEFT HIP PAIN: ICD-10-CM

## 2022-08-23 DIAGNOSIS — M19.012 ARTHRITIS OF LEFT SHOULDER REGION: ICD-10-CM

## 2022-08-23 PROCEDURE — G9899 SCRN MAM PERF RSLTS DOC: HCPCS | Performed by: FAMILY MEDICINE

## 2022-08-23 PROCEDURE — 3017F COLORECTAL CA SCREEN DOC REV: CPT | Performed by: FAMILY MEDICINE

## 2022-08-23 PROCEDURE — 99214 OFFICE O/P EST MOD 30 MIN: CPT | Performed by: FAMILY MEDICINE

## 2022-08-23 PROCEDURE — 1090F PRES/ABSN URINE INCON ASSESS: CPT | Performed by: FAMILY MEDICINE

## 2022-08-23 PROCEDURE — G8536 NO DOC ELDER MAL SCRN: HCPCS | Performed by: FAMILY MEDICINE

## 2022-08-23 PROCEDURE — 1101F PT FALLS ASSESS-DOCD LE1/YR: CPT | Performed by: FAMILY MEDICINE

## 2022-08-23 PROCEDURE — G8399 PT W/DXA RESULTS DOCUMENT: HCPCS | Performed by: FAMILY MEDICINE

## 2022-08-23 PROCEDURE — G8427 DOCREV CUR MEDS BY ELIG CLIN: HCPCS | Performed by: FAMILY MEDICINE

## 2022-08-23 PROCEDURE — G8754 DIAS BP LESS 90: HCPCS | Performed by: FAMILY MEDICINE

## 2022-08-23 PROCEDURE — G8417 CALC BMI ABV UP PARAM F/U: HCPCS | Performed by: FAMILY MEDICINE

## 2022-08-23 PROCEDURE — 1123F ACP DISCUSS/DSCN MKR DOCD: CPT | Performed by: FAMILY MEDICINE

## 2022-08-23 PROCEDURE — 20611 DRAIN/INJ JOINT/BURSA W/US: CPT | Performed by: FAMILY MEDICINE

## 2022-08-23 PROCEDURE — G8432 DEP SCR NOT DOC, RNG: HCPCS | Performed by: FAMILY MEDICINE

## 2022-08-23 PROCEDURE — G8752 SYS BP LESS 140: HCPCS | Performed by: FAMILY MEDICINE

## 2022-08-23 RX ORDER — TRIAMCINOLONE ACETONIDE 40 MG/ML
40 INJECTION, SUSPENSION INTRA-ARTICULAR; INTRAMUSCULAR ONCE
Qty: 1 ML | Refills: 0
Start: 2022-08-23 | End: 2022-08-23

## 2022-08-23 RX ORDER — LIDOCAINE HYDROCHLORIDE 10 MG/ML
3 INJECTION INFILTRATION; PERINEURAL ONCE
Qty: 3 ML | Refills: 0
Start: 2022-08-23 | End: 2022-08-23

## 2022-08-23 NOTE — PROGRESS NOTES
Identified Patient with two Patient identifiers (Name and ). Two Patient Identifiers confirmed. Reviewed record in preparation for visit and have obtained necessary documentation. Chief Complaint   Patient presents with    Hip Pain     Follow up on left hip pain - patient states she is feeling better since injection. Visit Vitals  /80 (BP 1 Location: Right arm, BP Patient Position: Sitting, BP Cuff Size: Large adult)   Pulse 89   Temp 98.5 °F (36.9 °C) (Oral)   Resp 18   Ht 5' 3\" (1.6 m)   Wt 200 lb (90.7 kg)   SpO2 95%   BMI 35.43 kg/m²       1. Have you been to the ER, urgent care clinic since your last visit? Hospitalized since your last visit? No    2. Have you seen or consulted any other health care providers outside of the [address] [place] [address] since your last visit? Include any pap smears or colon screening.  No
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
## 2022-09-20 ENCOUNTER — OFFICE VISIT (OUTPATIENT)
Dept: NEUROLOGY | Age: 67
End: 2022-09-20
Payer: MEDICARE

## 2022-09-20 DIAGNOSIS — R25.9 ABNORMAL INVOLUNTARY MOVEMENT: Primary | ICD-10-CM

## 2022-09-20 DIAGNOSIS — R47.89 WORD FINDING DIFFICULTY: ICD-10-CM

## 2022-09-20 PROCEDURE — G8427 DOCREV CUR MEDS BY ELIG CLIN: HCPCS | Performed by: PSYCHIATRY & NEUROLOGY

## 2022-09-20 PROCEDURE — G8510 SCR DEP NEG, NO PLAN REQD: HCPCS | Performed by: PSYCHIATRY & NEUROLOGY

## 2022-09-20 PROCEDURE — 1123F ACP DISCUSS/DSCN MKR DOCD: CPT | Performed by: PSYCHIATRY & NEUROLOGY

## 2022-09-20 PROCEDURE — 1090F PRES/ABSN URINE INCON ASSESS: CPT | Performed by: PSYCHIATRY & NEUROLOGY

## 2022-09-20 PROCEDURE — G8536 NO DOC ELDER MAL SCRN: HCPCS | Performed by: PSYCHIATRY & NEUROLOGY

## 2022-09-20 PROCEDURE — 99204 OFFICE O/P NEW MOD 45 MIN: CPT | Performed by: PSYCHIATRY & NEUROLOGY

## 2022-09-20 PROCEDURE — 3017F COLORECTAL CA SCREEN DOC REV: CPT | Performed by: PSYCHIATRY & NEUROLOGY

## 2022-09-20 PROCEDURE — G9899 SCRN MAM PERF RSLTS DOC: HCPCS | Performed by: PSYCHIATRY & NEUROLOGY

## 2022-09-20 PROCEDURE — G8417 CALC BMI ABV UP PARAM F/U: HCPCS | Performed by: PSYCHIATRY & NEUROLOGY

## 2022-09-20 PROCEDURE — 1101F PT FALLS ASSESS-DOCD LE1/YR: CPT | Performed by: PSYCHIATRY & NEUROLOGY

## 2022-09-20 PROCEDURE — G8756 NO BP MEASURE DOC: HCPCS | Performed by: PSYCHIATRY & NEUROLOGY

## 2022-09-20 PROCEDURE — G8399 PT W/DXA RESULTS DOCUMENT: HCPCS | Performed by: PSYCHIATRY & NEUROLOGY

## 2022-09-20 NOTE — PROGRESS NOTES
[address] [facility] and [address] at [facility] at [facility] [place], [address] [facility] [address]  [phone] Office  [phone] Facsimile           Referring: Jasmine Ormond, MD      No chief complaint on file. 59-year-old lady who presents today for evaluation of abnormal movements. She describes a jerking type movement that began occurring about 2 years ago. She notes that if she is trying to hold something her hand will jerk. If she tries to type on her cell phone her hand will jerk and she will type the wrong thing. It is worse if she is upset. She does not spill food. Handwriting has not been affected. No family history of tremor. She does note that her voice will quiver at times. She does not drink with any occasion to be able to say of alcohol makes it better or worse. She does not think it gets worse with anxiety. She describes this occurring again with action but she is describing jerking quick myoclonic type movements when she demonstrates those and does not demonstrate tremor. She has not had any medication for this. She has not had any evaluation for this. She thinks it has gotten worse over the last 2 years. She also notes that sometimes is hard for her to get out the right word. She knows what she wants to say but she just cannot get it out. Denies any dysarthria. No focal weakness. No perioral numbness. No loss or alteration in consciousness. No diplopia. Record review finds patient was seen by Dr. Lissa Brothers August 23, 2022 following up for left hip pain after injection and she was doing well with that. She was referred to physical therapy for neck pain and she had some arthritis of the left shoulder so she had an injection under ultrasound guidance.     Most recent laboratory analysis from February of this year  TSH normal  CBC unremarkable  Lipid panel with an LDL of 92  Past Medical History:   Diagnosis Date    Diabetes ([facility] [address]     Genital herpes simplex type 2     GERD (gastroesophageal reflux disease)     no medication prescribed    Hypercholesterolemia     Hypertension     Narcolepsy     Neuropathy in diabetes ([facility] [address]     Obesity (BMI 30.0-34. 9)        Past Surgical History:   Procedure Laterality Date    COLONOSCOPY N/A 2019    COLONOSCOPY performed by Farrukh Reilly MD at [address] [place] [address]  03/15/1979    HX  SECTION  1990    HX KNEE REPLACEMENT Bilateral     HX LIPOMA RESECTION Right 2019    Right buttock. HX MOHS PROCEDURES Right     HX ROTATOR CUFF REPAIR Right     WV EXTRAC ERUPTED TOOTH/EXPOSED ROOT Right 14    3 UPPER RIGHT SIDE BACK TEETH       Current Outpatient Medications   Medication Sig Dispense Refill    augmented betamethasone dipropionate (DIPROLENE-AF) 0.05 % topical cream APPLY TO AFFECTED AREA TWICE DAILY THEN WASH HANDS      glipiZIDE (GLUCOTROL) 10 mg tablet TAKE 1 TABLET BY MOUTH TWICE DAILY 180 Tablet 3    metFORMIN (GLUCOPHAGE) 1,000 mg tablet Take 1 Tablet by mouth daily. 180 Tablet 3    dulaglutide (Trulicity) 3 OE/3.6 mL pnij 3 mg by SubCUTAneous route every seven (7) days. Stop 1.5 mg 12 Each 3    amLODIPine-benazepril (LOTREL) 10-20 mg per capsule Take 1 Capsule by mouth daily. 90 Capsule 3    hydroCHLOROthiazide (HYDRODIURIL) 25 mg tablet TAKE 1 TABLET BY MOUTH DAILY 90 Tablet 3    cyanocobalamin 1,000 mcg tablet Take 1,000 mcg by mouth daily. lancets (Accu-Chek Fastclix Lancet Drum) misc Test once daily Dx Code: E11.65 100 Each 3    glucose blood VI test strips (Accu-Chek Laurel Plus test strp) strip Test once daily Dx Code: E11.65 100 Strip 3    valACYclovir (VALTREX) 1 gram tablet Take 1 Tab by mouth two (2) times a day. 20 Tab 5    nystatin (MYCOSTATIN) topical cream Apply  to affected area two (2) times a day.  60 g 1 rosuvastatin (CRESTOR) 10 mg tablet TAKE 1 TABLET BY MOUTH DAILY 90 Tab 3    traMADoL (ULTRAM) 50 mg tablet Take 50 mg by mouth three (3) times daily. pregabalin (LYRICA) 100 mg capsule Take 100 mg by mouth three (3) times daily. ammonium lactate (LAC-HYDRIN) 12 % lotion JEMAL EXT TO ALL SURFACES OF BOTH FEET BID      Blood-Glucose Meter (ACCU-CHEK GEORGINA PLUS METER) misc Test once daily Dx Code: E11.65 1 Each 0    peg 400-propylene glycol (SYSTANE) 0.4-0.3 % drop Administer 1 Drop to both eyes as needed. omega-3 fatty acids-vitamin e 1,000 mg cap Take 1 Cap by mouth daily. Allergies   Allergen Reactions    Aspirin Hives     Tolerates ibuprofen and naproxen    Clindamycin Rash and Hives       Social History     Tobacco Use    Smoking status: Never    Smokeless tobacco: Never   Vaping Use    Vaping Use: Never used   Substance Use Topics    Alcohol use: Yes     Alcohol/week: 0.0 - 1.0 standard drinks    Drug use: No       Family History   Problem Relation Age of Onset    Diabetes Mother     Heart Disease Mother     Hypertension Mother     Arthritis-rheumatoid Mother     Diabetes Father     Heart Disease Father     Cancer Maternal Aunt         bone    Diabetes Sister        Review of Systems  Pertinent positives and negatives as noted. Examination  Visit Vitals  LMP 01/01/2008 (LMP Unknown)     Visit Vitals  LMP 01/01/2008 (LMP Unknown)     She is pleasant and engaging. She is awake alert and oriented. She has normal speech and language. She has normal cognition. She has intact cranial nerves II-XII. No nystagmus. She has no pronation and no drift. There is no postural tremor today even with attempts at accentuation by holding a piece of paper. Multiple attempts to undertake activities to recreate the tremor were not successful today. She does have a bit of a quiver in her voice. No head tremor. She has no intention on finger-nose-finger. No cogwheeling.   Bulk and tone are normal for age. She resists fully in the upper and lower extremities in all muscle groups throughout testing. Reflexes are symmetrical.  No pathologic reflex. No ataxia. Impression/Plan  Interesting lady with a 2-year history of increasing abnormal movements and again this would seem on the surface to be essential tremor and she does have some quiver in her voice but I could not reproduce it on examination today and given that as well as given the fact that she is describing more myoclonic jerks we we will undertake an evaluation to include MRI EEG and carotid Doppler to start. If these are unremarkable we may wish to pursue other testing of the laboratory analysis type versus giving her a trial of medication for essential tremor. We will have her return after her testing and reexamine and perhaps we will be able to see the tremor as not being able to reproduce it today makes it quite difficult to  in a clinical fashion. Follow-up after testing    Buddy Croft MD          This note was created using voice recognition software. Despite editing, there may be syntax errors.

## 2022-10-19 ENCOUNTER — HOSPITAL ENCOUNTER (OUTPATIENT)
Dept: MRI IMAGING | Age: 67
Discharge: HOME | End: 2022-10-19
Attending: PSYCHIATRY & NEUROLOGY
Payer: MEDICARE

## 2022-10-19 DIAGNOSIS — R47.89 WORD FINDING DIFFICULTY: ICD-10-CM

## 2022-10-19 DIAGNOSIS — R25.9 ABNORMAL INVOLUNTARY MOVEMENT: ICD-10-CM

## 2022-10-19 PROCEDURE — 70551 MRI BRAIN STEM W/O DYE: CPT

## 2022-10-20 ENCOUNTER — TELEPHONE (OUTPATIENT)
Dept: NEUROLOGY | Age: 67
End: 2022-10-20

## 2022-10-20 NOTE — TELEPHONE ENCOUNTER
Pt missed EEG for today 10/20/22, stating she was unable to find builging. Requests to reschedule before appt with Dr Talha Barriga on 10/25/22. Please contact pt.

## 2022-10-29 LAB
BUN SERPL-MCNC: 22 MG/DL (ref 8–27)
BUN/CREAT SERPL: 14 (ref 12–28)
CALCIUM SERPL-MCNC: 9.3 MG/DL (ref 8.7–10.3)
CHLORIDE SERPL-SCNC: 106 MMOL/L (ref 96–106)
CO2 SERPL-SCNC: 26 MMOL/L (ref 20–29)
CREAT SERPL-MCNC: 1.58 MG/DL (ref 0.57–1)
EGFR: 36 ML/MIN/1.73
EST. AVERAGE GLUCOSE BLD GHB EST-MCNC: 163 MG/DL
GLUCOSE SERPL-MCNC: 102 MG/DL (ref 70–99)
HBA1C MFR BLD: 7.3 % (ref 4.8–5.6)
INTERPRETATION: NORMAL
POTASSIUM SERPL-SCNC: 4.5 MMOL/L (ref 3.5–5.2)
SODIUM SERPL-SCNC: 146 MMOL/L (ref 134–144)

## 2022-11-23 ENCOUNTER — OFFICE VISIT (OUTPATIENT)
Dept: NEUROLOGY | Age: 67
End: 2022-11-23

## 2022-11-23 ENCOUNTER — TELEPHONE (OUTPATIENT)
Dept: NEUROLOGY | Age: 67
End: 2022-11-23

## 2022-11-23 DIAGNOSIS — R47.89 WORD FINDING DIFFICULTY: ICD-10-CM

## 2022-11-23 DIAGNOSIS — R25.9 ABNORMAL INVOLUNTARY MOVEMENT: Primary | ICD-10-CM

## 2022-11-30 NOTE — PROCEDURES
[facility] AT [place]   EEG Report    Procedure ID: [id number] Procedure Date: 11/22/2022   Patient Name: Stacy Carr YOB: 1955   Procedure Type: Routine Medical Record No: [id number]       An EEG is requested in this 49-year-old lady to evaluate for epileptiform abnormalities. Medications said to include Ultram, Lyrica, Crestor, Trulicity, Lotrel, Glucophage, Glucotrol    This tracings obtained during the awake state. During wakefulness there are intermittent runs of posteriorly dominant and symmetric low to medium amplitude 9 cps activities which attenuate with eye opening. Lower voltage faster frequency activities are seen symmetrically over the anterior head regions. Hyperventilation is not performed. Intermittent photic stimulation little alters the tracing. Sleep is not attained.     Interpretation  This EEG recorded during the awake state is normal.        Kassie Javier MD

## 2022-12-07 ENCOUNTER — VIRTUAL VISIT (OUTPATIENT)
Dept: NEUROLOGY | Age: 67
End: 2022-12-07
Payer: MEDICARE

## 2022-12-07 DIAGNOSIS — G47.33 OSA (OBSTRUCTIVE SLEEP APNEA): ICD-10-CM

## 2022-12-07 DIAGNOSIS — R25.9 ABNORMAL INVOLUNTARY MOVEMENT: Primary | ICD-10-CM

## 2022-12-07 RX ORDER — PRIMIDONE 50 MG/1
50-100 TABLET ORAL
Qty: 60 TABLET | Refills: 5 | Status: SHIPPED | OUTPATIENT
Start: 2022-12-07

## 2022-12-07 NOTE — PROGRESS NOTES
Involuntary movements getting worse, only in hands   Speech getting worse, hard time finding words, gets stuck on words

## 2022-12-09 NOTE — PROGRESS NOTES
Angelique Barr is a 79 y.o. female who was seen by synchronous (real-time) audio-video technology on 12/7/2022 for Follow-up  FU testing. Virtually   Tremors are worse. Involuntary movements are worse. She has trouble with function. She does want a referral to sleep medicine. She has no other changes. Assessment & Plan:   Diagnoses and all orders for this visit:    1. Abnormal involuntary movement  -     primidone (MYSOLINE) 50 mg tablet; Take 1-2 Tablets by mouth nightly. Indications: essential tremor    2. SUZI (obstructive sleep apnea)  -     SLEEP MEDICINE REFERRAL    Discussed testing in full   Sleep medicine referral to evaluate SUZI   Tremor   Try Mysoline 1-2 tablets nightly for tremor. Discussed side effects and tremor. Fu after. Subjective:       Prior to Admission medications    Medication Sig Start Date End Date Taking? Authorizing Provider   primidone (MYSOLINE) 50 mg tablet Take 1-2 Tablets by mouth nightly. Indications: essential tremor 12/7/22  Yes Italo Marrero, JUSTINA   glipiZIDE (GLUCOTROL) 10 mg tablet TAKE 1 TABLET BY MOUTH TWICE DAILY 5/26/22  Yes Rica Fuller MD   metFORMIN (GLUCOPHAGE) 1,000 mg tablet Take 1 Tablet by mouth daily. 5/26/22  Yes Rica Fuller MD   dulaglutide (Trulicity) 3 XW/4.4 mL pnij 3 mg by SubCUTAneous route every seven (7) days. Stop 1.5 mg 5/26/22  Yes Rica Fuller MD   amLODIPine-benazepril (LOTREL) 10-20 mg per capsule Take 1 Capsule by mouth daily. 4/21/22  Yes Gautam Landers MD   hydroCHLOROthiazide (HYDRODIURIL) 25 mg tablet TAKE 1 TABLET BY MOUTH DAILY 4/21/22  Yes Gautam Landers MD   cyanocobalamin 1,000 mcg tablet Take 1,000 mcg by mouth daily.    Yes Provider, Historical   lancets (Accu-Chek Fastclix Lancet Drum) misc Test once daily Dx Code: E11.65 1/19/22  Yes Rica Fuller MD   glucose blood VI test strips (Accu-Chek Laurel Plus test strp) strip Test once daily Dx Code: E11.65 1/19/22 Yes Jg Hastings MD   valACYclovir (VALTREX) 1 gram tablet Take 1 Tab by mouth two (2) times a day. 5/12/21  Yes Montey Aschoff, MD   nystatin (MYCOSTATIN) topical cream Apply  to affected area two (2) times a day. 4/26/21  Yes Montey Aschoff, MD   rosuvastatin (CRESTOR) 10 mg tablet TAKE 1 TABLET BY MOUTH DAILY 2/8/21  Yes Montey Aschoff, MD   traMADoL (ULTRAM) 50 mg tablet Take 50 mg by mouth three (3) times daily. Yes Provider, Historical   pregabalin (LYRICA) 100 mg capsule Take 100 mg by mouth three (3) times daily. 4/22/20  Yes Provider, Historical   ammonium lactate (LAC-HYDRIN) 12 % lotion JEMAL EXT TO ALL SURFACES OF BOTH FEET BID 2/14/20  Yes Provider, Historical   Blood-Glucose Meter (ACCU-CHEK GEORGINA PLUS METER) misc Test once daily Dx Code: E11.65 11/13/19  Yes Jg Hastings MD   peg 400-propylene glycol (SYSTANE) 0.4-0.3 % drop Administer 1 Drop to both eyes as needed. Yes Provider, Historical   omega-3 fatty acids-vitamin e 1,000 mg cap Take 1 Cap by mouth daily. Yes Provider, Historical     Patient Active Problem List   Diagnosis Code    Hypertension I10    Diabetes ([facility] E11.9    Hypercholesterolemia E78.00    Neuropathy due to secondary diabetes ([facility] E13.40    Lumbar stenosis M48.061    Lumbar herniated disc M51.26    Postcoital UTI N39.0    Type 2 diabetes mellitus with diabetic nephropathy, without long-term current use of insulin (HCC) E11.21    Type 2 diabetes mellitus with hyperglycemia, without long-term current use of insulin (HCC) E11.65    Closed nondisplaced fracture of first cervical vertebra (HCC) S12.001A    Closed fracture of multiple ribs of right side with routine healing S22.41XD    Type 2 diabetes mellitus with diabetic neuropathy ([facility]) E11.40    HSV-2 infection B00.9    Breast cancer screening Z12.39    Cervical cancer screening Z12.4    Obesity (BMI 30.0-34. 9) E66.9    Shingles rash B02.9    S/P excision of lipoma Z98.890, Z86.018    Severe obesity ([facility]) E66.01    Infection of skin due to methicillin resistant Staphylococcus aureus (MRSA) L08.9, B95.62    S/P colonoscopy Z98.890    Right axillary hidradenitis L73.2    CKD (chronic kidney disease) stage 4, GFR 15-29 ml/min ([facility]) N18.4    Chronic renal disease, stage III N18.30    Hip pain, acute, left M25.552     Patient Active Problem List    Diagnosis Date Noted    CKD (chronic kidney disease) stage 4, GFR 15-29 ml/min ([facility]) 07/21/2022    Chronic renal disease, stage III 07/21/2022    Hip pain, acute, left 07/21/2022    Right axillary hidradenitis 07/13/2021    S/P colonoscopy 06/30/2021    Infection of skin due to methicillin resistant Staphylococcus aureus (MRSA) 12/05/2020    Severe obesity ([address] 08/14/2019    S/P excision of lipoma 07/17/2019    Shingles rash 07/03/2019    Obesity (BMI 30.0-34.9) 06/25/2019    HSV-2 infection 12/01/2018    Breast cancer screening 12/01/2018    Cervical cancer screening 12/01/2018    Type 2 diabetes mellitus with diabetic neuropathy ([address] 10/03/2018    Closed nondisplaced fracture of first cervical vertebra ([address] 11/08/2017    Closed fracture of multiple ribs of right side with routine healing 11/08/2017    Type 2 diabetes mellitus with diabetic nephropathy, without long-term current use of insulin ([address] 08/14/2017    Type 2 diabetes mellitus with hyperglycemia, without long-term current use of insulin ([address] 08/14/2017    Postcoital UTI 08/14/2015    Lumbar stenosis 11/07/2014    Lumbar herniated disc 11/07/2014    Neuropathy due to secondary diabetes ([address] 09/08/2011    Hypertension     Diabetes ([facility])     Hypercholesterolemia      Current Outpatient Medications   Medication Sig Dispense Refill    primidone (MYSOLINE) 50 mg tablet Take 1-2 Tablets by mouth nightly. Indications: essential tremor 60 Tablet 5    glipiZIDE (GLUCOTROL) 10 mg tablet TAKE 1 TABLET BY MOUTH TWICE DAILY 180 Tablet 3    metFORMIN (GLUCOPHAGE) 1,000 mg tablet Take 1 Tablet by mouth daily.  [address] [address] Tablet 3    dulaglutide (Trulicity) 3 UL/2.4 mL pnij 3 mg by SubCUTAneous route every seven (7) days. Stop 1.5 mg 12 Each 3    amLODIPine-benazepril (LOTREL) 10-20 mg per capsule Take 1 Capsule by mouth daily. 90 Capsule 3    hydroCHLOROthiazide (HYDRODIURIL) 25 mg tablet TAKE 1 TABLET BY MOUTH DAILY 90 Tablet 3    cyanocobalamin 1,000 mcg tablet Take 1,000 mcg by mouth daily. lancets (Accu-Chek Fastclix Lancet Drum) misc Test once daily Dx Code: E11.65 100 Each 3    glucose blood VI test strips (Accu-Chek Georgina Plus test strp) strip Test once daily Dx Code: E11.65 100 Strip 3    valACYclovir (VALTREX) 1 gram tablet Take 1 Tab by mouth two (2) times a day. 20 Tab 5    nystatin (MYCOSTATIN) topical cream Apply  to affected area two (2) times a day. 60 g 1    rosuvastatin (CRESTOR) 10 mg tablet TAKE 1 TABLET BY MOUTH DAILY 90 Tab 3    traMADoL (ULTRAM) 50 mg tablet Take 50 mg by mouth three (3) times daily. pregabalin (LYRICA) 100 mg capsule Take 100 mg by mouth three (3) times daily. ammonium lactate (LAC-HYDRIN) 12 % lotion JEMAL EXT TO ALL SURFACES OF BOTH FEET BID      Blood-Glucose Meter (ACCU-CHEK GEORGINA PLUS METER) misc Test once daily Dx Code: E11.65 1 Each 0    peg 400-propylene glycol (SYSTANE) 0.4-0.3 % drop Administer 1 Drop to both eyes as needed. omega-3 fatty acids-vitamin e 1,000 mg cap Take 1 Cap by mouth daily. Allergies   Allergen Reactions    Aspirin Hives     Tolerates ibuprofen and naproxen    Clindamycin Rash and Hives     Past Medical History:   Diagnosis Date    Diabetes ([facility] 1995    Genital herpes simplex type 2     GERD (gastroesophageal reflux disease)     no medication prescribed    Hypercholesterolemia     Hypertension     Narcolepsy     Neuropathy in diabetes ([facility]     Obesity (BMI 30.0-34. 9)      Past Surgical History:   Procedure Laterality Date    COLONOSCOPY N/A 8/6/2019    COLONOSCOPY performed by Samia Pablo MD at [address] [place] [address] 03/15/1979    HX  SECTION  1990    HX KNEE REPLACEMENT Bilateral     HX LIPOMA RESECTION Right 2019    Right buttock. HX MOHS PROCEDURES Right     HX ROTATOR CUFF REPAIR Right     PA EXTRAC ERUPTED TOOTH/EXPOSED ROOT Right 14    3 UPPER RIGHT SIDE BACK TEETH     Family History   Problem Relation Age of Onset    Diabetes Mother     Heart Disease Mother     Hypertension Mother     Arthritis-rheumatoid Mother     Diabetes Father     Heart Disease Father     Cancer Maternal Aunt         bone    Diabetes Sister      Social History     Tobacco Use    Smoking status: Never    Smokeless tobacco: Never   Substance Use Topics    Alcohol use: Yes     Alcohol/week: 0.0 - 1.0 standard drinks       Review of Systems   Eyes:  Negative for blurred vision, double vision and photophobia. Respiratory:  Negative for shortness of breath and wheezing. Cardiovascular:  Negative for chest pain and palpitations. Gastrointestinal:  Negative for nausea and vomiting. Neurological:  Positive for tremors. Negative for dizziness, tingling, seizures, loss of consciousness, weakness and headaches.      Objective:     Patient-Reported Vitals 2022   Patient-Reported Pulse 72   Patient-Reported SpO2 96   Patient-Reported Systolic  171   Patient-Reported Diastolic 72        [INSTRUCTIONS:  \"[x]\" Indicates a positive item  \"[]\" Indicates a negative item  -- DELETE ALL ITEMS NOT EXAMINED]    Constitutional: [x] Appears well-developed and well-nourished [x] No apparent distress      [] Abnormal -     Mental status: [x] Alert and awake  [x] Oriented to person/place/time [x] Able to follow commands    [] Abnormal -     Eyes:   EOM    [x]  Normal    [] Abnormal -   Sclera  [x]  Normal    [] Abnormal -          Discharge [x]  None visible   [] Abnormal -     HENT: [x] Normocephalic, atraumatic  [] Abnormal -   [x] Mouth/Throat: Mucous membranes are moist    External Ears [x] Normal  [] Abnormal -    Neck: [x] No visualized mass [] Abnormal -     Pulmonary/Chest: [x] Respiratory effort normal   [x] No visualized signs of difficulty breathing or respiratory distress        [] Abnormal -      Musculoskeletal:   [x] Normal gait with no signs of ataxia         [x] Normal range of motion of neck        [] Abnormal -     Neurological:        [x] No Facial Asymmetry (Cranial nerve 7 motor function) (limited exam due to video visit)          [x] No gaze palsy        [] Abnormal -          Skin:        [x] No significant exanthematous lesions or discoloration noted on facial skin         [] Abnormal -            Psychiatric:       [x] Normal Affect [] Abnormal -        [x] No Hallucinations    Other pertinent observable physical exam findings:-        We discussed the expected course, resolution and complications of the diagnosis(es) in detail. Medication risks, benefits, costs, interactions, and alternatives were discussed as indicated. I advised her to contact the office if her condition worsens, changes or fails to improve as anticipated. She expressed understanding with the diagnosis(es) and plan. Lenore Walker, was evaluated through a synchronous (real-time) audio-video encounter. The patient (or guardian if applicable) is aware that this is a billable service, which includes applicable co-pays. This Virtual Visit was conducted with patient's (and/or legal guardian's) consent. The visit was conducted pursuant to the emergency declaration under the [address] [place] [address] [facility] authority and the [employer] and [employer] Act. Patient identification was verified, and a caregiver was present when appropriate. The patient was located at: Home: [address] [place] [phone]  The provider was located at:  Facility (Appt Department): [address] [place] [address] [address], [place]

## 2022-12-16 ENCOUNTER — HOSPITAL ENCOUNTER (OUTPATIENT)
Dept: GENERAL RADIOLOGY | Age: 67
End: 2022-12-16
Payer: MEDICARE

## 2022-12-16 ENCOUNTER — TRANSCRIBE ORDER (OUTPATIENT)
Dept: REGISTRATION | Age: 67
End: 2022-12-16

## 2022-12-16 DIAGNOSIS — M25.519 PAIN IN JOINT, SHOULDER REGION: ICD-10-CM

## 2022-12-16 DIAGNOSIS — M25.519 PAIN IN JOINT, SHOULDER REGION: Primary | ICD-10-CM

## 2022-12-16 PROCEDURE — 73030 X-RAY EXAM OF SHOULDER: CPT

## 2023-01-03 ENCOUNTER — TELEPHONE (OUTPATIENT)
Dept: NEUROLOGY | Age: 68
End: 2023-01-03

## 2023-01-13 ENCOUNTER — TELEPHONE (OUTPATIENT)
Dept: SLEEP MEDICINE | Age: 68
End: 2023-01-13

## 2023-01-27 DIAGNOSIS — E11.65 TYPE 2 DIABETES MELLITUS WITH HYPERGLYCEMIA, WITHOUT LONG-TERM CURRENT USE OF INSULIN (HCC): Primary | ICD-10-CM

## 2023-01-27 NOTE — TELEPHONE ENCOUNTER
Pt called , unable to get trulicity 3mg . Pharmacy does have lower doses in stock , [id number] [address] for pt to do lower dose until 3 mg is back in stock ?

## 2023-01-30 RX ORDER — DULAGLUTIDE 1.5 MG/.5ML
1.5 INJECTION, SOLUTION SUBCUTANEOUS
Qty: 2 ML | Refills: 1 | Status: SHIPPED | OUTPATIENT
Start: 2023-01-30

## 2023-02-22 ENCOUNTER — OFFICE VISIT (OUTPATIENT)
Dept: NEUROLOGY | Age: 68
End: 2023-02-22
Payer: MEDICARE

## 2023-02-22 VITALS — SYSTOLIC BLOOD PRESSURE: 122 MMHG | OXYGEN SATURATION: 99 % | HEART RATE: 76 BPM | DIASTOLIC BLOOD PRESSURE: 84 MMHG

## 2023-02-22 DIAGNOSIS — R25.9 ABNORMAL INVOLUNTARY MOVEMENT: Primary | ICD-10-CM

## 2023-02-22 PROCEDURE — 3079F DIAST BP 80-89 MM HG: CPT | Performed by: NURSE PRACTITIONER

## 2023-02-22 PROCEDURE — 3074F SYST BP LT 130 MM HG: CPT | Performed by: NURSE PRACTITIONER

## 2023-02-22 PROCEDURE — 1090F PRES/ABSN URINE INCON ASSESS: CPT | Performed by: NURSE PRACTITIONER

## 2023-02-22 PROCEDURE — G8417 CALC BMI ABV UP PARAM F/U: HCPCS | Performed by: NURSE PRACTITIONER

## 2023-02-22 PROCEDURE — G8427 DOCREV CUR MEDS BY ELIG CLIN: HCPCS | Performed by: NURSE PRACTITIONER

## 2023-02-22 PROCEDURE — G8399 PT W/DXA RESULTS DOCUMENT: HCPCS | Performed by: NURSE PRACTITIONER

## 2023-02-22 PROCEDURE — 3017F COLORECTAL CA SCREEN DOC REV: CPT | Performed by: NURSE PRACTITIONER

## 2023-02-22 PROCEDURE — G9899 SCRN MAM PERF RSLTS DOC: HCPCS | Performed by: NURSE PRACTITIONER

## 2023-02-22 PROCEDURE — G8536 NO DOC ELDER MAL SCRN: HCPCS | Performed by: NURSE PRACTITIONER

## 2023-02-22 PROCEDURE — 1123F ACP DISCUSS/DSCN MKR DOCD: CPT | Performed by: NURSE PRACTITIONER

## 2023-02-22 PROCEDURE — 1101F PT FALLS ASSESS-DOCD LE1/YR: CPT | Performed by: NURSE PRACTITIONER

## 2023-02-22 PROCEDURE — G8432 DEP SCR NOT DOC, RNG: HCPCS | Performed by: NURSE PRACTITIONER

## 2023-02-22 PROCEDURE — 99214 OFFICE O/P EST MOD 30 MIN: CPT | Performed by: NURSE PRACTITIONER

## 2023-02-22 RX ORDER — PROPRANOLOL HYDROCHLORIDE 60 MG/1
60 CAPSULE, EXTENDED RELEASE ORAL
Qty: 30 CAPSULE | Refills: 5 | Status: SHIPPED | OUTPATIENT
Start: 2023-02-22

## 2023-02-22 NOTE — PROGRESS NOTES
Fermin Medina is a 79 y.o. female who presents with the following  Chief Complaint   Patient presents with    Follow-up       HPI      Follow-up for tremors  We did try primidone at 100 mg nightly and this did not help her tremor  She is still taking this but we will stop  We did discuss other treatments  It is mostly when she is moving or texting or holding something she will shake  It is both hands  She also has a lower lip tremor/quiver  She notices these on a day-to-day basis  She has not tried anything else for these  We discussed other treatments and Topamax, propanolol    She was called by sleep medicine but never got in due to a scheduling issue and they never called her back so we will give her this information as she was diagnosed with narcolepsy in the past  She does want to stay away from things that will make her even more tired so we will stop on the Topamax for now        Allergies   Allergen Reactions    Aspirin Hives     Tolerates ibuprofen and naproxen    Clindamycin Rash and Hives       Current Outpatient Medications   Medication Sig    propranolol LA (INDERAL LA) 60 mg SR capsule Take 1 Capsule by mouth nightly. dulaglutide (Trulicity) 1.5 ZQ/3.1 mL sub-q pen 0.5 mL by SubCUTAneous route every seven (7) days. glipiZIDE (GLUCOTROL) 10 mg tablet TAKE 1 TABLET BY MOUTH TWICE DAILY    metFORMIN (GLUCOPHAGE) 1,000 mg tablet Take 1 Tablet by mouth daily. dulaglutide (Trulicity) 3 XP/7.5 mL pnij 3 mg by SubCUTAneous route every seven (7) days. Stop 1.5 mg    amLODIPine-benazepril (LOTREL) 10-20 mg per capsule Take 1 Capsule by mouth daily. hydroCHLOROthiazide (HYDRODIURIL) 25 mg tablet TAKE 1 TABLET BY MOUTH DAILY    cyanocobalamin 1,000 mcg tablet Take 1,000 mcg by mouth daily.     lancets (Accu-Chek Fastclix Lancet Drum) misc Test once daily Dx Code: E11.65    glucose blood VI test strips (Accu-Chek Laurel Plus test strp) strip Test once daily Dx Code: E11.65    valACYclovir (VALTREX) 1 gram tablet Take 1 Tab by mouth two (2) times a day. nystatin (MYCOSTATIN) topical cream Apply  to affected area two (2) times a day. traMADoL (ULTRAM) 50 mg tablet Take 50 mg by mouth three (3) times daily. pregabalin (LYRICA) 100 mg capsule Take 100 mg by mouth three (3) times daily. Blood-Glucose Meter (ACCU-CHEK GEORGINA PLUS METER) misc Test once daily Dx Code: E11.65    peg 400-propylene glycol (SYSTANE) 0.4-0.3 % drop Administer 1 Drop to both eyes as needed. omega-3 fatty acids-vitamin e 1,000 mg cap Take 1 Cap by mouth daily. No current facility-administered medications for this visit. Social History     Tobacco Use   Smoking Status Never   Smokeless Tobacco Never       Past Medical History:   Diagnosis Date    Diabetes ([facility]     Genital herpes simplex type 2     GERD (gastroesophageal reflux disease)     no medication prescribed    Hypercholesterolemia     Hypertension     Narcolepsy     Neuropathy in diabetes ([facility]     Obesity (BMI 30.0-34. 9)        Past Surgical History:   Procedure Laterality Date    COLONOSCOPY N/A 2019    COLONOSCOPY performed by Taryn Warren MD at [facility]  03/15/1979    HX  SECTION  1990    HX KNEE REPLACEMENT Bilateral     HX LIPOMA RESECTION Right 2019    Right buttock.     HX MOHS PROCEDURES Right     HX ROTATOR CUFF REPAIR Right     UT EXTRAC ERUPTED TOOTH/EXPOSED ROOT Right 14    3 UPPER RIGHT SIDE BACK TEETH       Family History   Problem Relation Age of Onset    Diabetes Mother     Heart Disease Mother     Hypertension Mother     Arthritis-rheumatoid Mother     Diabetes Father     Heart Disease Father     Cancer Maternal Aunt         bone    Diabetes Sister        Social History     Socioeconomic History    Marital status:    Tobacco Use    Smoking status: Never    Smokeless tobacco: Never   Vaping Use    Vaping Use: Never used   Substance and Sexual Activity    Alcohol use: Yes     Alcohol/week: 0.0 - 1.0 standard drinks    Drug use: No    Sexual activity: Yes     Partners: Male     Birth control/protection: None       Review of Systems   Eyes:  Negative for blurred vision, double vision and photophobia. Gastrointestinal:  Negative for nausea and vomiting. Neurological:  Positive for tremors. Negative for dizziness and headaches. Remainder of comprehensive review is negative. Physical Exam :    Visit Vitals  /84 (BP 1 Location: Left upper arm, BP Patient Position: Sitting, BP Cuff Size: Large adult)   Pulse 76   LMP 01/01/2008 (LMP Unknown)   SpO2 99%       General: Well defined, nourished, and groomed individual in no acute distress. Musculoskeletal: Extremities revealed no edema and had full range of motion of joints. Psych: Good mood and bright affect    NEUROLOGICAL EXAMINATION:    Mental Status: Alert and oriented to person, place, and time    Cranial Nerves:    II, III, IV, VI: Visual acuity grossly intact. Visual fields are normal.    Pupils are equal, round, and reactive to light and accommodation. Extra-ocular movements are full and fluid. Fundoscopic exam was benign, no ptosis or nystagmus. V-XII: Hearing is grossly intact. Facial features are symmetric, with normal sensation and strength. The palate rises symmetrically and the tongue protrudes midline. Sternocleidomastoids 5/5. Motor Examination: Normal tone, bulk, and strength, 5/5 muscle strength throughout. Coordination: mild tremor with reaching, holding phone     Gait and Station: Steady while walking. Normal arm swing. No pronator drift. No muscle wasting or fasiculations noted. Reflexes: DTRs 2+ throughout.             Results for orders placed or performed in visit on 47/61/01   METABOLIC PANEL, BASIC   Result Value Ref Range    Glucose 98 65 - 99 mg/dL    BUN 30 (H) 8 - 27 mg/dL    Creatinine 1.91 (H) 0.57 - 1.00 mg/dL    eGFR 29 (L) >59 mL/min/1.73    BUN/Creatinine ratio 16 12 - 28    Sodium 142 134 - 144 mmol/L    Potassium 4.0 3.5 - 5.2 mmol/L    Chloride 103 96 - 106 mmol/L    CO2 21 20 - 29 mmol/L    Calcium 9.3 8.7 - 10.3 mg/dL   CKD REPORT   Result Value Ref Range    Interpretation Note        Orders Placed This Encounter    propranolol LA (INDERAL LA) 60 mg SR capsule     Sig: Take 1 Capsule by mouth nightly. Dispense:  30 Capsule     Refill:  5       1.  Abnormal involuntary movement      The primidone did not help  We will try propanolol nightly at 60 mg  We did discuss this in full and a essential type action tremor  She is annoyed by the tremor and does not want to treat              This note will not be viewable in MyChart

## 2023-03-07 DIAGNOSIS — E11.65 TYPE 2 DIABETES MELLITUS WITH HYPERGLYCEMIA, WITHOUT LONG-TERM CURRENT USE OF INSULIN (HCC): ICD-10-CM

## 2023-03-08 RX ORDER — GLIPIZIDE 10 MG/1
TABLET ORAL
Qty: 180 TABLET | Refills: 3 | Status: SHIPPED | OUTPATIENT
Start: 2023-03-08

## 2023-03-15 ENCOUNTER — OFFICE VISIT (OUTPATIENT)
Dept: ENDOCRINOLOGY | Age: 68
End: 2023-03-15

## 2023-03-15 VITALS
WEIGHT: 202 LBS | HEIGHT: 63 IN | BODY MASS INDEX: 35.79 KG/M2 | TEMPERATURE: 99 F | RESPIRATION RATE: 20 BRPM | HEART RATE: 71 BPM | DIASTOLIC BLOOD PRESSURE: 74 MMHG | OXYGEN SATURATION: 96 % | SYSTOLIC BLOOD PRESSURE: 127 MMHG

## 2023-03-15 DIAGNOSIS — N18.4 CKD (CHRONIC KIDNEY DISEASE) STAGE 4, GFR 15-29 ML/MIN (HCC): ICD-10-CM

## 2023-03-15 DIAGNOSIS — E11.65 TYPE 2 DIABETES MELLITUS WITH HYPERGLYCEMIA, WITHOUT LONG-TERM CURRENT USE OF INSULIN (HCC): Primary | ICD-10-CM

## 2023-03-15 DIAGNOSIS — I10 PRIMARY HYPERTENSION: ICD-10-CM

## 2023-03-15 DIAGNOSIS — E78.2 MIXED HYPERLIPIDEMIA: ICD-10-CM

## 2023-03-15 NOTE — PROGRESS NOTES
Joe Renee is a 79 y.o. female here for   Chief Complaint   Patient presents with    Diabetes       1. Have you been to the ER, urgent care clinic since your last visit? Hospitalized since your last visit? - no    2. Have you seen or consulted any other health care providers outside of the [address] [place] [address] since your last visit?   Include any pap smears or colon screening.-[facility] for abscess in mouth  - has seen dentist 2 weeks ago
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
## 2023-03-15 NOTE — LETTER
3/17/2023    Patient: Josue Jean   YOB: 1955   Date of Visit: 3/15/2023     John Acevedo MD  [address]  [address] [facility] [address]    Dear John Acevedo MD,      Thank you for referring Ms. Landen Pulido to [address] [place] [address] for evaluation. My notes for this consultation are attached. If you have questions, please do not hesitate to call me. I look forward to following your patient along with you.       Sincerely,    Constantino Montemyaor MD

## 2023-03-16 ENCOUNTER — OFFICE VISIT (OUTPATIENT)
Dept: SLEEP MEDICINE | Age: 68
End: 2023-03-16
Payer: MEDICARE

## 2023-03-16 VITALS
SYSTOLIC BLOOD PRESSURE: 130 MMHG | HEART RATE: 65 BPM | DIASTOLIC BLOOD PRESSURE: 78 MMHG | HEIGHT: 63 IN | BODY MASS INDEX: 35.61 KG/M2 | WEIGHT: 201 LBS | OXYGEN SATURATION: 98 %

## 2023-03-16 DIAGNOSIS — E66.01 SEVERE OBESITY (BMI 35.0-39.9) WITH COMORBIDITY (HCC): ICD-10-CM

## 2023-03-16 DIAGNOSIS — G47.33 OSA (OBSTRUCTIVE SLEEP APNEA): Primary | ICD-10-CM

## 2023-03-16 PROCEDURE — 99204 OFFICE O/P NEW MOD 45 MIN: CPT | Performed by: SPECIALIST

## 2023-03-16 PROCEDURE — G8399 PT W/DXA RESULTS DOCUMENT: HCPCS | Performed by: SPECIALIST

## 2023-03-16 PROCEDURE — 1101F PT FALLS ASSESS-DOCD LE1/YR: CPT | Performed by: SPECIALIST

## 2023-03-16 PROCEDURE — 1090F PRES/ABSN URINE INCON ASSESS: CPT | Performed by: SPECIALIST

## 2023-03-16 PROCEDURE — 3078F DIAST BP <80 MM HG: CPT | Performed by: SPECIALIST

## 2023-03-16 PROCEDURE — 3017F COLORECTAL CA SCREEN DOC REV: CPT | Performed by: SPECIALIST

## 2023-03-16 PROCEDURE — 1123F ACP DISCUSS/DSCN MKR DOCD: CPT | Performed by: SPECIALIST

## 2023-03-16 PROCEDURE — 3075F SYST BP GE 130 - 139MM HG: CPT | Performed by: SPECIALIST

## 2023-03-16 PROCEDURE — G8427 DOCREV CUR MEDS BY ELIG CLIN: HCPCS | Performed by: SPECIALIST

## 2023-03-16 PROCEDURE — G8417 CALC BMI ABV UP PARAM F/U: HCPCS | Performed by: SPECIALIST

## 2023-03-16 PROCEDURE — G8432 DEP SCR NOT DOC, RNG: HCPCS | Performed by: SPECIALIST

## 2023-03-16 PROCEDURE — G8536 NO DOC ELDER MAL SCRN: HCPCS | Performed by: SPECIALIST

## 2023-03-16 PROCEDURE — G9899 SCRN MAM PERF RSLTS DOC: HCPCS | Performed by: SPECIALIST

## 2023-03-16 NOTE — PROGRESS NOTES
[address] [facility] [place]. [place], [address]  Tel.  [phone]  Fax. [phone] [facility]  [place], [address] [facility]  Tel.  [phone]  Fax. [phone] [address]   Tel.  [phone]  Fax. [phone]       Chief Complaint       Chief Complaint   Patient presents with    Sleep Problem     NP; ref  Ether Dawood, JUSTINA; snore fatigue       HPI      [place] Money is 79 y.o. female seen for evaluation of a sleep disorder. The patient reports she has experienced significant daytime sleepiness; told that she must \"have narcolepsy\". Normally retires between 8: 30-11 PM and will get a bed between 6-6: 30 AM.  May awaken during the night. Describes self as tired on awakening and during the day. Has been told of snoring noted to be loud, heard in separate rooms and through close doors. Denies sleep talking or sleepwalking, bruxism or nocturnal incontinence, abnormal arm movements, hypnagogue hallucinations, sleep paralysis or cataplexy. May easily doze if seated and an active such when reading, watching TV, public place, as a passenger, seated without alcohol, in traffic for several minutes. The patient has not undergone diagnostic testing for the current problems. Currently takes tramadol 50 mg 3 times daily for back pain/neuropathy. [place] Sleepiness Score: (P) 22       Allergies   Allergen Reactions    Aspirin Hives     Tolerates ibuprofen and naproxen    Clindamycin Rash and Hives       Current Outpatient Medications   Medication Sig Dispense Refill    glipiZIDE (GLUCOTROL) 10 mg tablet TAKE 1 TABLET BY MOUTH TWICE DAILY 180 Tablet 3    propranolol LA (INDERAL LA) 60 mg SR capsule Take 1 Capsule by mouth nightly. 30 Capsule 5    dulaglutide (Trulicity) 1.5 WS/2.3 mL sub-q pen 0.5 mL by SubCUTAneous route every seven (7) days. 2 mL 1    metFORMIN (GLUCOPHAGE) 1,000 mg tablet Take 1 Tablet by mouth daily.  [address] [address] Tablet 3    amLODIPine-benazepril (LOTREL) 10-20 mg per capsule Take 1 Capsule by mouth daily. 90 Capsule 3    hydroCHLOROthiazide (HYDRODIURIL) 25 mg tablet TAKE 1 TABLET BY MOUTH DAILY 90 Tablet 3    cyanocobalamin 1,000 mcg tablet Take 1,000 mcg by mouth daily. lancets (Accu-Chek Fastclix Lancet Drum) misc Test once daily Dx Code: E11.65 100 Each 3    glucose blood VI test strips (Accu-Chek Laurel Plus test strp) strip Test once daily Dx Code: E11.65 100 Strip 3    valACYclovir (VALTREX) 1 gram tablet Take 1 Tab by mouth two (2) times a day. (Patient taking differently: Take 1,000 mg by mouth as needed.) 20 Tab 5    nystatin (MYCOSTATIN) topical cream Apply  to affected area two (2) times a day. 60 g 1    traMADoL (ULTRAM) 50 mg tablet Take 50 mg by mouth three (3) times daily. pregabalin (LYRICA) 100 mg capsule Take 100 mg by mouth three (3) times daily. Blood-Glucose Meter (ACCU-CHEK LAUREL PLUS METER) misc Test once daily Dx Code: E11.65 1 Each 0    peg 400-propylene glycol (SYSTANE) 0.4-0.3 % drop Administer 1 Drop to both eyes as needed. omega-3 fatty acids-vitamin e 1,000 mg cap Take 1 Cap by mouth daily. dulaglutide (Trulicity) 3 BH/8.5 mL pnij 3 mg by SubCUTAneous route every seven (7) days. Stop 1.5 mg 12 Each 3        She  has a past medical history of Diabetes ([address] (), Genital herpes simplex type 2, GERD (gastroesophageal reflux disease), Hypercholesterolemia, Hypertension, Narcolepsy, Neuropathy in diabetes ([address] and Obesity (BMI 30.0-34.9). She  has a past surgical history that includes pr extrac erupted tooth/exposed root (Right, 14); hx  section (03/15/1979); hx  section (1990); hx mohs procedure (Right, ); hx knee replacement (Bilateral, ); hx rotator cuff repair (Right); colonoscopy (N/A, 2019); and hx lipoma resection (Right, 2019).     She family history includes Arthritis-rheumatoid in her mother; Cancer in her maternal aunt; Diabetes in her father, mother, and sister; Heart Disease in her father and mother; Hypertension in her mother. She  reports that she has never smoked. She has never used smokeless tobacco. She reports current alcohol use. She reports that she does not use drugs. Review of Systems:  ROS      Objective:   Visit Vitals  /78   Pulse 65   Ht 5' 3\" (1.6 m)   Wt 201 lb (91.2 kg)   LMP 01/01/2008 (LMP Unknown)   SpO2 98%   BMI 35.61 kg/m²     Body mass index is 35.61 kg/m². General:   Conversant, cooperative   Eyes:  Pupils equal and reactive, no nystagmus   Oropharynx:   Mallampati score II, tongue normal, narrow posterior oral airway       Neck:   No carotid bruits; Neck circ. in \"inches\": 16   Chest/Lungs:  Clear on auscultation    CVS:  Normal rate, regular rhythm   Skin:  Warm to touch; no obvious rashes   Neuro:  Speech fluent, face symmetrical, tongue movement normal   Psych:  Normal affect,  normal countenance        Assessment:       ICD-10-CM ICD-9-CM    1. SUZI (obstructive sleep apnea)  G47.33 327.23 SPLIT CPAP/PSG      2. Severe obesity (BMI 35.0-39. 9) with comorbidity ([facility]  E66.01 278.01 SPLIT CPAP/PSG          History consistent with sleep disordered breathing. Patient currently on tramadol 50 mg 3 times daily. May be contributing to severity of sleep breathing abnormalities. Patient will be evaluated with a sleep study, split per criteria. Would benefit from weight reduction. Was advised that weight loss measures often more effective when sleep disordered breathing concurrently treated. Plan:     Orders Placed This Encounter    SPLIT CPAP/PSG     Standing Status:   Future     Standing Expiration Date:   9/16/2023     Order Specific Question:   Reason for Exam     Answer:   snoring       * Patient has a history and examination consistent with the diagnosis of sleep apnea. * Sleep testing was ordered for initial evaluation.     * She was provided information on sleep apnea including corresponding risk factors and the importance of proper treatment. * Treatment options if indicated were reviewed today. Instructions: The patient would benefit from weight reduction measures. Do not engage in activities requiring a normal degree of alertness if fatigue is present. The patient understands that untreated or undertreated sleep apnea could impair judgement and the ability to function normally during the day. Call or return if symptoms worsen or persist.          Ricardo Alejandra MD, FAASM  Electronically signed 03/16/23       This note was created using voice recognition software. Despite editing, there may be syntax errors. This note will not be viewable in [address]

## 2023-03-25 DIAGNOSIS — E11.65 TYPE 2 DIABETES MELLITUS WITH HYPERGLYCEMIA, WITHOUT LONG-TERM CURRENT USE OF INSULIN (HCC): ICD-10-CM

## 2023-03-27 RX ORDER — DULAGLUTIDE 1.5 MG/.5ML
INJECTION, SOLUTION SUBCUTANEOUS
Qty: 2 ML | Refills: 1 | Status: SHIPPED | OUTPATIENT
Start: 2023-03-27

## 2023-04-18 ENCOUNTER — OFFICE VISIT (OUTPATIENT)
Dept: NEUROLOGY | Age: 68
End: 2023-04-18
Payer: MEDICARE

## 2023-04-18 VITALS
DIASTOLIC BLOOD PRESSURE: 80 MMHG | OXYGEN SATURATION: 98 % | HEART RATE: 76 BPM | TEMPERATURE: 97 F | SYSTOLIC BLOOD PRESSURE: 110 MMHG | RESPIRATION RATE: 14 BRPM

## 2023-04-18 DIAGNOSIS — G25.0 ESSENTIAL TREMOR: Primary | ICD-10-CM

## 2023-04-18 PROCEDURE — G8399 PT W/DXA RESULTS DOCUMENT: HCPCS | Performed by: PSYCHIATRY & NEUROLOGY

## 2023-04-18 PROCEDURE — 1123F ACP DISCUSS/DSCN MKR DOCD: CPT | Performed by: PSYCHIATRY & NEUROLOGY

## 2023-04-18 PROCEDURE — 3074F SYST BP LT 130 MM HG: CPT | Performed by: PSYCHIATRY & NEUROLOGY

## 2023-04-18 PROCEDURE — 3017F COLORECTAL CA SCREEN DOC REV: CPT | Performed by: PSYCHIATRY & NEUROLOGY

## 2023-04-18 PROCEDURE — G8536 NO DOC ELDER MAL SCRN: HCPCS | Performed by: PSYCHIATRY & NEUROLOGY

## 2023-04-18 PROCEDURE — 1101F PT FALLS ASSESS-DOCD LE1/YR: CPT | Performed by: PSYCHIATRY & NEUROLOGY

## 2023-04-18 PROCEDURE — 1090F PRES/ABSN URINE INCON ASSESS: CPT | Performed by: PSYCHIATRY & NEUROLOGY

## 2023-04-18 PROCEDURE — 3079F DIAST BP 80-89 MM HG: CPT | Performed by: PSYCHIATRY & NEUROLOGY

## 2023-04-18 PROCEDURE — 99214 OFFICE O/P EST MOD 30 MIN: CPT | Performed by: PSYCHIATRY & NEUROLOGY

## 2023-04-18 PROCEDURE — G8510 SCR DEP NEG, NO PLAN REQD: HCPCS | Performed by: PSYCHIATRY & NEUROLOGY

## 2023-04-18 PROCEDURE — G8427 DOCREV CUR MEDS BY ELIG CLIN: HCPCS | Performed by: PSYCHIATRY & NEUROLOGY

## 2023-04-18 PROCEDURE — G8417 CALC BMI ABV UP PARAM F/U: HCPCS | Performed by: PSYCHIATRY & NEUROLOGY

## 2023-04-18 PROCEDURE — G9899 SCRN MAM PERF RSLTS DOC: HCPCS | Performed by: PSYCHIATRY & NEUROLOGY

## 2023-04-18 RX ORDER — PROPRANOLOL HYDROCHLORIDE 60 MG/1
60 CAPSULE, EXTENDED RELEASE ORAL
Qty: 30 CAPSULE | Refills: 5 | Status: SHIPPED | OUTPATIENT
Start: 2023-04-18

## 2023-04-18 NOTE — PROGRESS NOTES
[facility] and [address] at [facility] at [address] [facility], [address] [facility] [address] [facility], [address] [place] [address]   [phone]              Chief Complaint   Patient presents with    Tremors    Results     Discuss sleep study     Current Outpatient Medications   Medication Sig Dispense Refill    amLODIPine-benazepril (LOTREL) 10-20 mg per capsule Take 1 Capsule by mouth daily. 90 Capsule 3    hydroCHLOROthiazide (HYDRODIURIL) 25 mg tablet TAKE 1 TABLET BY MOUTH DAILY 90 Tablet 3    Trulicity 1.5 IE/6.6 mL sub-q pen ADMINISTER 1.5 MG UNDER THE SKIN EVERY 7 DAYS 2 mL 1    glipiZIDE (GLUCOTROL) 10 mg tablet TAKE 1 TABLET BY MOUTH TWICE DAILY 180 Tablet 3    propranolol LA (INDERAL LA) 60 mg SR capsule Take 1 Capsule by mouth nightly. 30 Capsule 5    metFORMIN (GLUCOPHAGE) 1,000 mg tablet Take 1 Tablet by mouth daily. 180 Tablet 3    cyanocobalamin 1,000 mcg tablet Take 1 Tablet by mouth daily. lancets (Accu-Chek Fastclix Lancet Drum) misc Test once daily Dx Code: E11.65 100 Each 3    glucose blood VI test strips (Accu-Chek Laurel Plus test strp) strip Test once daily Dx Code: E11.65 100 Strip 3    valACYclovir (VALTREX) 1 gram tablet Take 1 Tab by mouth two (2) times a day. (Patient taking differently: Take 1 Tablet by mouth as needed.) 20 Tab 5    nystatin (MYCOSTATIN) topical cream Apply  to affected area two (2) times a day. 60 g 1    traMADoL (ULTRAM) 50 mg tablet Take 1 Tablet by mouth three (3) times daily. pregabalin (LYRICA) 100 mg capsule Take 1 Capsule by mouth three (3) times daily. Blood-Glucose Meter (ACCU-CHEK LAUREL PLUS METER) misc Test once daily Dx Code: E11.65 1 Each 0    peg 400-propylene glycol (SYSTANE) 0.4-0.3 % drop Administer 1 Drop to both eyes as needed. omega-3 fatty acids-vitamin e 1,000 mg cap Take 1 Capsule by mouth daily.       dulaglutide (Trulicity) 3 mg/0.5 mL pnij 3 mg by SubCUTAneous route every seven (7) days. Stop 1.5 mg 12 Each 3      Allergies   Allergen Reactions    Aspirin Hives     Tolerates ibuprofen and naproxen    Clindamycin Rash and Hives     Social History     Tobacco Use    Smoking status: Never    Smokeless tobacco: Never   Vaping Use    Vaping Use: Never used   Substance Use Topics    Alcohol use: Yes     Alcohol/week: 0.0 - 1.0 standard drinks     Comment: social    Drug use: No     70-year-old lady returns today for follow-up after her last visit with nurse practitioner Janes in February. At that time she did not get a response to Mysoline 100 mg nightly. Inderal was started. MRI of the brain from October 2022 unremarkable  EEG November 2022 normal  Carotid Doppler September 2022 unremarkable    Today she reports the Inderal is doing well with her tremor. She will notice it on occasion but overall she is happy with how she is doing. Tolerating the medicine without difficulty. Examination  Visit Vitals  /80   Pulse 76   Temp 97 °F (36.1 °C)   Resp 14   LMP 01/01/2008 (LMP Unknown)   SpO2 98%     Pleasant lady. She is awake alert and oriented. Speech and language normal.  Cognition normal.  Minimal postural tremor of the outstretched hands and no intention on finger-nose-finger. Impression/Plan  Essential tremor doing well on Inderal  Continue this  Follow-up 6-month    Rachna Andrade MD        This note was created using voice recognition software. Despite editing, there may be syntax errors.

## 2023-04-23 DIAGNOSIS — E11.65 TYPE 2 DIABETES MELLITUS WITH HYPERGLYCEMIA, WITHOUT LONG-TERM CURRENT USE OF INSULIN (HCC): Primary | ICD-10-CM

## 2023-04-23 DIAGNOSIS — N18.4 CKD (CHRONIC KIDNEY DISEASE) STAGE 4, GFR 15-29 ML/MIN (HCC): ICD-10-CM

## 2023-04-24 DIAGNOSIS — E11.65 TYPE 2 DIABETES MELLITUS WITH HYPERGLYCEMIA, WITHOUT LONG-TERM CURRENT USE OF INSULIN (HCC): Primary | ICD-10-CM

## 2023-04-24 DIAGNOSIS — N18.4 CKD (CHRONIC KIDNEY DISEASE) STAGE 4, GFR 15-29 ML/MIN (HCC): ICD-10-CM

## 2023-04-24 NOTE — TELEPHONE ENCOUNTER
Patient left a message with the answering service that her medication isn't working. Looking at her chart it looks like Primidone is the only medication Jazz Wheatley has prescribed. She'd like a call back. Detail Level: Zone Plan: Patient can continue to use calcipotriene and betamethasone ointment as needed for flares.

## 2023-04-30 ENCOUNTER — TELEPHONE (OUTPATIENT)
Dept: SLEEP MEDICINE | Age: 68
End: 2023-04-30

## 2023-04-30 DIAGNOSIS — G47.33 OSA (OBSTRUCTIVE SLEEP APNEA): Primary | ICD-10-CM

## 2023-05-03 DIAGNOSIS — G25.0 ESSENTIAL TREMOR: Primary | ICD-10-CM

## 2023-05-03 RX ORDER — PROPRANOLOL HYDROCHLORIDE 60 MG/1
60 CAPSULE, EXTENDED RELEASE ORAL
Qty: 90 CAPSULE | Refills: 1 | Status: SHIPPED | OUTPATIENT
Start: 2023-05-03

## 2023-05-04 ENCOUNTER — DOCUMENTATION ONLY (OUTPATIENT)
Dept: SLEEP MEDICINE | Age: 68
End: 2023-05-04

## 2023-05-08 ENCOUNTER — CLINICAL DOCUMENTATION (OUTPATIENT)
Age: 68
End: 2023-05-08

## 2023-05-10 DIAGNOSIS — B00.9 HERPESVIRAL INFECTION, UNSPECIFIED: Primary | ICD-10-CM

## 2023-05-10 RX ORDER — VALACYCLOVIR HYDROCHLORIDE 1 G/1
1000 TABLET, FILM COATED ORAL 2 TIMES DAILY
Qty: 20 TABLET | Refills: 0 | Status: SHIPPED | OUTPATIENT
Start: 2023-05-10

## 2023-05-10 NOTE — TELEPHONE ENCOUNTER
Received a faxed medication refill request from Jose Skinner. Patient is requesting a refill of valacyclovir 1 gram tabs. You last saw the patient June 2022, you last filled the medication in 2021.

## 2023-05-22 DIAGNOSIS — E11.65 TYPE 2 DIABETES MELLITUS WITH HYPERGLYCEMIA, WITHOUT LONG-TERM CURRENT USE OF INSULIN (HCC): Primary | ICD-10-CM

## 2023-05-22 RX ORDER — PRIMIDONE 50 MG/1
TABLET ORAL
COMMUNITY
Start: 2023-05-22

## 2023-05-22 RX ORDER — DULAGLUTIDE 1.5 MG/.5ML
INJECTION, SOLUTION SUBCUTANEOUS
Qty: 6 ML | Refills: 3 | Status: SHIPPED | OUTPATIENT
Start: 2023-05-22 | End: 2023-05-26

## 2023-05-22 RX ORDER — LANCETS
EACH MISCELLANEOUS
COMMUNITY
Start: 2022-01-19

## 2023-05-22 RX ORDER — BLOOD SUGAR DIAGNOSTIC
STRIP MISCELLANEOUS
COMMUNITY
Start: 2022-01-19

## 2023-05-26 DIAGNOSIS — E11.65 TYPE 2 DIABETES MELLITUS WITH HYPERGLYCEMIA, WITHOUT LONG-TERM CURRENT USE OF INSULIN (HCC): Primary | ICD-10-CM

## 2023-05-26 RX ORDER — DULAGLUTIDE 3 MG/.5ML
INJECTION, SOLUTION SUBCUTANEOUS
Qty: 6 ML | Refills: 3 | Status: SHIPPED | OUTPATIENT
Start: 2023-05-26

## 2023-05-30 DIAGNOSIS — B00.9 HERPESVIRAL INFECTION, UNSPECIFIED: ICD-10-CM

## 2023-05-30 RX ORDER — VALACYCLOVIR HYDROCHLORIDE 1 G/1
1000 TABLET, FILM COATED ORAL 2 TIMES DAILY
Qty: 20 TABLET | Refills: 0 | Status: SHIPPED | OUTPATIENT
Start: 2023-05-30

## 2023-06-04 DIAGNOSIS — E11.65 TYPE 2 DIABETES MELLITUS WITH HYPERGLYCEMIA, WITHOUT LONG-TERM CURRENT USE OF INSULIN (HCC): Primary | ICD-10-CM

## 2023-06-05 RX ORDER — GLIPIZIDE 10 MG/1
TABLET ORAL
Qty: 180 TABLET | Refills: 3 | Status: SHIPPED | OUTPATIENT
Start: 2023-06-05

## 2023-06-09 ENCOUNTER — TELEPHONE (OUTPATIENT)
Age: 68
End: 2023-06-09

## 2023-06-28 ENCOUNTER — TELEPHONE (OUTPATIENT)
Age: 68
End: 2023-06-28

## 2023-08-24 DIAGNOSIS — E11.65 TYPE 2 DIABETES MELLITUS WITH HYPERGLYCEMIA, UNSPECIFIED WHETHER LONG TERM INSULIN USE (HCC): ICD-10-CM

## 2023-08-24 DIAGNOSIS — E78.2 MIXED HYPERLIPIDEMIA: ICD-10-CM

## 2023-08-24 DIAGNOSIS — I10 ESSENTIAL HYPERTENSION: ICD-10-CM

## 2023-08-25 ENCOUNTER — OFFICE VISIT (OUTPATIENT)
Age: 68
End: 2023-08-25
Payer: MEDICARE

## 2023-08-25 VITALS
WEIGHT: 204.3 LBS | DIASTOLIC BLOOD PRESSURE: 72 MMHG | HEIGHT: 63 IN | HEART RATE: 70 BPM | BODY MASS INDEX: 36.2 KG/M2 | OXYGEN SATURATION: 96 % | SYSTOLIC BLOOD PRESSURE: 115 MMHG

## 2023-08-25 DIAGNOSIS — G47.33 OSA (OBSTRUCTIVE SLEEP APNEA): Primary | ICD-10-CM

## 2023-08-25 PROCEDURE — 3074F SYST BP LT 130 MM HG: CPT | Performed by: SPECIALIST

## 2023-08-25 PROCEDURE — G8427 DOCREV CUR MEDS BY ELIG CLIN: HCPCS | Performed by: SPECIALIST

## 2023-08-25 PROCEDURE — 1090F PRES/ABSN URINE INCON ASSESS: CPT | Performed by: SPECIALIST

## 2023-08-25 PROCEDURE — G8399 PT W/DXA RESULTS DOCUMENT: HCPCS | Performed by: SPECIALIST

## 2023-08-25 PROCEDURE — 1123F ACP DISCUSS/DSCN MKR DOCD: CPT | Performed by: SPECIALIST

## 2023-08-25 PROCEDURE — 3017F COLORECTAL CA SCREEN DOC REV: CPT | Performed by: SPECIALIST

## 2023-08-25 PROCEDURE — 99213 OFFICE O/P EST LOW 20 MIN: CPT | Performed by: SPECIALIST

## 2023-08-25 PROCEDURE — 3078F DIAST BP <80 MM HG: CPT | Performed by: SPECIALIST

## 2023-08-25 PROCEDURE — G8417 CALC BMI ABV UP PARAM F/U: HCPCS | Performed by: SPECIALIST

## 2023-08-25 PROCEDURE — 1036F TOBACCO NON-USER: CPT | Performed by: SPECIALIST

## 2023-08-25 RX ORDER — CLOBETASOL PROPIONATE 0.5 MG/G
CREAM TOPICAL
COMMUNITY
Start: 2023-05-27

## 2023-08-25 ASSESSMENT — SLEEP AND FATIGUE QUESTIONNAIRES
HOW LIKELY ARE YOU TO NOD OFF OR FALL ASLEEP WHILE SITTING AND READING: 3
HOW LIKELY ARE YOU TO NOD OFF OR FALL ASLEEP WHILE SITTING AND TALKING TO SOMEONE: 0
HOW LIKELY ARE YOU TO NOD OFF OR FALL ASLEEP IN A CAR, WHILE STOPPED FOR A FEW MINUTES IN TRAFFIC: 2
ESS TOTAL SCORE: 17
HOW LIKELY ARE YOU TO NOD OFF OR FALL ASLEEP WHILE SITTING QUIETLY AFTER LUNCH WITHOUT ALCOHOL: 2
HOW LIKELY ARE YOU TO NOD OFF OR FALL ASLEEP WHEN YOU ARE A PASSENGER IN A CAR FOR AN HOUR WITHOUT A BREAK: 3
HOW LIKELY ARE YOU TO NOD OFF OR FALL ASLEEP WHILE WATCHING TV: 3
HOW LIKELY ARE YOU TO NOD OFF OR FALL ASLEEP WHILE LYING DOWN TO REST IN THE AFTERNOON WHEN CIRCUMSTANCES PERMIT: 3
HOW LIKELY ARE YOU TO NOD OFF OR FALL ASLEEP WHILE SITTING INACTIVE IN A PUBLIC PLACE: 1

## 2023-08-28 ENCOUNTER — OFFICE VISIT (OUTPATIENT)
Age: 68
End: 2023-08-28
Payer: MEDICARE

## 2023-08-28 VITALS
BODY MASS INDEX: 36.29 KG/M2 | OXYGEN SATURATION: 96 % | DIASTOLIC BLOOD PRESSURE: 68 MMHG | HEIGHT: 63 IN | SYSTOLIC BLOOD PRESSURE: 112 MMHG | TEMPERATURE: 98.4 F | WEIGHT: 204.81 LBS | RESPIRATION RATE: 18 BRPM | HEART RATE: 62 BPM

## 2023-08-28 DIAGNOSIS — I10 ESSENTIAL HYPERTENSION: ICD-10-CM

## 2023-08-28 DIAGNOSIS — Z00.00 MEDICARE ANNUAL WELLNESS VISIT, SUBSEQUENT: Primary | ICD-10-CM

## 2023-08-28 DIAGNOSIS — B00.9 HERPESVIRAL INFECTION, UNSPECIFIED: ICD-10-CM

## 2023-08-28 DIAGNOSIS — E11.65 TYPE 2 DIABETES MELLITUS WITH HYPERGLYCEMIA, WITHOUT LONG-TERM CURRENT USE OF INSULIN (HCC): ICD-10-CM

## 2023-08-28 DIAGNOSIS — Z12.31 ENCOUNTER FOR SCREENING MAMMOGRAM FOR MALIGNANT NEOPLASM OF BREAST: ICD-10-CM

## 2023-08-28 PROCEDURE — 3051F HG A1C>EQUAL 7.0%<8.0%: CPT | Performed by: FAMILY MEDICINE

## 2023-08-28 PROCEDURE — 3074F SYST BP LT 130 MM HG: CPT | Performed by: FAMILY MEDICINE

## 2023-08-28 PROCEDURE — 3078F DIAST BP <80 MM HG: CPT | Performed by: FAMILY MEDICINE

## 2023-08-28 PROCEDURE — 3017F COLORECTAL CA SCREEN DOC REV: CPT | Performed by: FAMILY MEDICINE

## 2023-08-28 PROCEDURE — 1123F ACP DISCUSS/DSCN MKR DOCD: CPT | Performed by: FAMILY MEDICINE

## 2023-08-28 PROCEDURE — G0439 PPPS, SUBSEQ VISIT: HCPCS | Performed by: FAMILY MEDICINE

## 2023-08-28 RX ORDER — HYDROCHLOROTHIAZIDE 25 MG/1
25 TABLET ORAL DAILY
Qty: 90 TABLET | Refills: 3 | Status: SHIPPED | OUTPATIENT
Start: 2023-08-28

## 2023-08-28 RX ORDER — LIDOCAINE 50 MG/G
PATCH TOPICAL
COMMUNITY
Start: 2023-05-27

## 2023-08-28 RX ORDER — AMLODIPINE BESYLATE AND BENAZEPRIL HYDROCHLORIDE 10; 20 MG/1; MG/1
1 CAPSULE ORAL DAILY
Qty: 90 CAPSULE | Refills: 3 | Status: SHIPPED | OUTPATIENT
Start: 2023-08-28

## 2023-08-28 SDOH — ECONOMIC STABILITY: HOUSING INSECURITY
IN THE LAST 12 MONTHS, WAS THERE A TIME WHEN YOU DID NOT HAVE A STEADY PLACE TO SLEEP OR SLEPT IN A SHELTER (INCLUDING NOW)?: NO

## 2023-08-28 SDOH — ECONOMIC STABILITY: INCOME INSECURITY: HOW HARD IS IT FOR YOU TO PAY FOR THE VERY BASICS LIKE FOOD, HOUSING, MEDICAL CARE, AND HEATING?: NOT HARD AT ALL

## 2023-08-28 SDOH — ECONOMIC STABILITY: FOOD INSECURITY: WITHIN THE PAST 12 MONTHS, YOU WORRIED THAT YOUR FOOD WOULD RUN OUT BEFORE YOU GOT MONEY TO BUY MORE.: NEVER TRUE

## 2023-08-28 SDOH — ECONOMIC STABILITY: FOOD INSECURITY: WITHIN THE PAST 12 MONTHS, THE FOOD YOU BOUGHT JUST DIDN'T LAST AND YOU DIDN'T HAVE MONEY TO GET MORE.: NEVER TRUE

## 2023-08-28 ASSESSMENT — LIFESTYLE VARIABLES
HOW MANY STANDARD DRINKS CONTAINING ALCOHOL DO YOU HAVE ON A TYPICAL DAY: 1 OR 2
HOW OFTEN DO YOU HAVE A DRINK CONTAINING ALCOHOL: MONTHLY OR LESS

## 2023-08-28 ASSESSMENT — PATIENT HEALTH QUESTIONNAIRE - PHQ9
SUM OF ALL RESPONSES TO PHQ QUESTIONS 1-9: 0
SUM OF ALL RESPONSES TO PHQ QUESTIONS 1-9: 0
1. LITTLE INTEREST OR PLEASURE IN DOING THINGS: 0
SUM OF ALL RESPONSES TO PHQ QUESTIONS 1-9: 0
SUM OF ALL RESPONSES TO PHQ QUESTIONS 1-9: 0
2. FEELING DOWN, DEPRESSED OR HOPELESS: 0
SUM OF ALL RESPONSES TO PHQ9 QUESTIONS 1 & 2: 0

## 2023-08-28 NOTE — PROGRESS NOTES
Medicare Annual Wellness Visit    Ariel Angel is here for Medicare AWV    Assessment & Plan   Medicare annual wellness visit, subsequent  -     [facility] -  Referral to ACP Clinical Specialist  Type 2 diabetes mellitus with hyperglycemia, without long-term current use of insulin ([address])  -      DIABETES FOOT EXAM  Herpesviral infection, unspecified  Essential hypertension  -     amLODIPine-benazepril (LOTREL) 10-20 MG per capsule; Take 1 capsule by mouth daily, Disp-90 capsule, R-3Normal  -     hydroCHLOROthiazide (HYDRODIURIL) 25 MG tablet; Take 1 tablet by mouth daily, Disp-90 tablet, R-3Normal  Encounter for screening mammogram for malignant neoplasm of breast  -     SARAH DIGITAL SCREEN W OR WO CAD BILATERAL; Future    Recommendations for Preventive Services Due: see orders and patient instructions/AVS.  Recommended screening schedule for the next 5-10 years is provided to the patient in written form: see Patient Instructions/AVS.     Return in about 6 months (around 2/28/2024) for [place] medical conditions follow . Subjective       Patient's complete Health Risk Assessment and screening values have been reviewed and are found in Flowsheets. The following problems were reviewed today and where indicated follow up appointments were made and/or referrals ordered. Positive Risk Factor Screenings with Interventions:    Fall Risk:  Do you feel unsteady or are you worried about falling? : (!) yes  2 or more falls in past year?: no  Fall with injury in past year?: no              Opioid Risk: (Low risk score <55) Opioid risk score: 9    Patient is low risk for opioid use disorder or overdose.   Last PDMP [place] as Reviewed:  Review User Review Instant Review Result                  General HRA Questions:  Select all that apply: (!) New or Increased Pain   Weight and Activity:  Physical Activity: Sufficiently Active    Days of Exercise per Week: 4 days    Minutes of Exercise per Session: 60 min     On average,
normal  (minimum of 5 random plantar locations tested, avoiding callused areas - > 1 area with absence of sensation is + for neuropathy)    Plus at least one of the following:  Pulses: normal,   Pinprick: Intact  Proprioception: Intact  Vibration (128 Hz): Intact          Allergies   Allergen Reactions    Aspirin Hives     Tolerates ibuprofen and naproxen    Clindamycin Hives and Rash     Prior to Visit Medications    Medication Sig Taking?  Authorizing Provider   lidocaine (LIDODERM) 5 % UNWRAP AND APPLY 1 PATCH TO SKIN FOR UP 12 HOURS Yes Historical Provider, MD   Omega-3 Fatty Acids (FISH OIL PO) Take by mouth Yes Historical Provider, MD   amLODIPine-benazepril (LOTREL) 10-20 MG per capsule Take 1 capsule by mouth daily Yes Tesha Cho MD   hydroCHLOROthiazide (HYDRODIURIL) 25 MG tablet Take 1 tablet by mouth daily Yes Tesha Cho MD   metFORMIN (GLUCOPHAGE) 1000 MG tablet TAKE 1 TABLET BY MOUTH DAILY Yes Gustavo Braun MD   clobetasol (TEMOVATE) 0.05 % cream  Yes Historical Provider, MD   rosuvastatin (CRESTOR) 20 MG tablet Take 1 tablet by mouth daily Yes Gustavo Braun MD   Dulaglutide (TRULICITY) 3 ZR/4.1YO SOPN INJECT 3 MG UNDER THE SKIN EVERY 7 DAYS, STOP 1.5 MG DOSE Yes Gustavo Braun MD   glipiZIDE (GLUCOTROL) 10 MG tablet Take 1 tablet by mouth 2 times daily Yes Gustavo Braun MD   valACYclovir (VALTREX) 1 g tablet Take 1 tablet by mouth 2 times daily  Patient taking differently: Take 1 tablet by mouth 2 times daily as needed Yes Tesha Cho MD   blood glucose test strips (ACCU-CHEK [place] PLUS) strip Test once daily Dx Code: E11.65 Yes Historical Provider, MD   Accu-Chek FastClix Lancets MISC Test once daily Dx Code: E11.65 Yes Historical Provider, MD   nystatin (MYCOSTATIN) 299362 UNIT/GM cream Apply topically 2 times daily Yes Ar Automatic Reconciliation   polyethyl glycol-propyl glycol 0.4-0.3 % (SYSTANE) 0.4-0.3 % ophthalmic solution Apply 1 drop to eye as

## 2023-08-28 NOTE — ACP (ADVANCE CARE PLANNING)
Advance Care Planning      Advance Care Planning (ACP) Physician/NP/PA Conversation       Date of Conversation: 8/28/2023  Conducted with: Patient with Decision Making Capacity, updated in the chart. Healthcare Decision Maker:   No healthcare decision makers have been documented. Click here to complete [address] including selection of the Healthcare Decision Maker Relationship (ie \"Primary\")        Today we discussed [address] The patient is considering options. Care Preferences:     Hospitalization: \"If your health worsens and it becomes clear that your chance of recovery is unlikely, what would be your preference regarding hospitalization? \"  The patient would prefer hospitalization. Ventilation: \"If you were unable to breathe on your own and your chance of recovery was unlikely, what would be your preference about the use of a ventilator (breathing machine) if it was available to you? \"   The patient would NOT desire the use of a ventilator. Resuscitation: \"In the event your heart stopped as a result of an underlying serious health condition, would you want attempts to be made to restart your heart, or would you prefer a natural death? \"   No, do NOT resuscitate.       Additional topics discussed: treatment goals and benefit/burden of treatment options     Conversation Outcomes / Follow-Up Plan:   ACP in process - information provided, considering goals and options  Reviewed DNR/DNI and patient elects Full Code (Attempt Resuscitation)      Length of Voluntary ACP Conversation in minutes:  <16 minutes (Non-Billable)     Eden Shetty MD

## 2023-08-29 ENCOUNTER — CLINICAL DOCUMENTATION (OUTPATIENT)
Dept: SPIRITUAL SERVICES | Age: 68
End: 2023-08-29

## 2023-08-29 NOTE — ACP (ADVANCE CARE PLANNING)
Advance Care Planning   Ambulatory ACP Specialist Patient Outreach    Date:  8/29/2023    ACP Specialist:  Brittany Leon    Outreach call to patient in follow-up to ACP Specialist referral Neymar Serna MD    [x] PCP  [] Provider   [] Ambulatory Care Management [] Other     For:                  [] Advance Directive Assistance              [x] Complete Portable DNR order              [] Complete POST/POLST/MOST              [] Code Status Discussion             [] Discuss Goals of Care             [] Early ACP Decision-Making              [] Other (Specify)    Date Referral Received: 8/28/2023    Next Step:   [x] ACP scheduled conversation  [] Outreach again in one week               [x] Email / Mail [address]  [x] Email / Mail Advance Directive   [] Closing referral.  Routing closure to referring provider/staff and to ACP Specialist . [] Closure letter mailed to patient with invitation to contact ACP Specialist if / when ready. [] Other (Specify here):         [x] At this time, Healthcare Decision Maker Is:    Advance Care Planning   Healthcare Decision Maker:    Primary Decision Maker: WiergateLaura - Other Relative - [phone]      [x] Primary agent named in scanned advance directive. [] Legal Next of Kin. [] Unable to determine legal decision maker at this time. Outreaches:       [x] 1st -  Date:  8/29/2023               Intervention:  [x] Spoke with Patient   [] Left Voice mail [] Email / Mail    [] Burtt  [] Other [phone] : Outcomes:  Patient is agreeable to a conversation with ACP Specialist Patrice Alexis on Wednesday, 9/6/2023 at 11:00 AM.  A copy of [facility] and ACP information sheets sent to patient's confirmed email address on file. Patient states she is interested in updating AMD to add daughter, Marlo Dudley, as Secondary HCDM. Patient currently has AMD and DNR on file from 2014.       Thank you for this referral.

## 2023-09-06 ENCOUNTER — CLINICAL DOCUMENTATION (OUTPATIENT)
Dept: CASE MANAGEMENT | Age: 68
End: 2023-09-06

## 2023-09-06 NOTE — ACP (ADVANCE CARE PLANNING)
Advance Care Planning   Ambulatory ACP Specialist Patient Outreach    Date:  9/6/2023, late entry for 9/5/23    ACP Specialist:  El Dewitt RN    Outreach call to patient in follow-up to ACP Specialist referral Esther Major MD    [x] PCP  [] Provider   [] Ambulatory Care Management [] Other     For:                  [] Advance Directive Assistance              [x] Complete Portable DNR order              [] Complete POST/POLST/MOST              [] Code Status Discussion             [] Discuss Goals of Care             [] Early ACP Decision-Making              [] Other (Specify)    Date Referral Received:8/28/23    Next Step:   [] ACP scheduled conversation  [x] Outreach again in one week               [] Email / Mail [address]  [] Email / Mail Advance Directive   [] Closing referral.  Routing closure to referring provider/staff and to ACP Specialist . [] Closure letter mailed to patient with invitation to contact ACP Specialist if / when ready. [] Other (Specify here):         [] At this time, Healthcare Decision Maker Is: Jeniffer Arredondo (cousin)        [x] Primary agent named in scanned advance directive. [] Legal Next of Kin. [] Unable to determine legal decision maker at this time. [x]  Additional Outreach -  Date:  9/5/23, 9/6/23   (Specify Dates & special circumstances): Outcomes: 9/5/23:  RN called pt to remind her of appointment scheduled for 9/6/23 @11:00AM. Pt stated she had not received the ACP information emailed to her. RN emailed the information to pt and she acknowledged the receipt. El Dewitt RN    9/6/23:  RN attempted call to pt for scheduled ACP conversation. No answer, RN left message for return call. RN will outreach again within one week.   El Dewitt RN          Thank you for this referral.

## 2023-09-06 NOTE — ACP (ADVANCE CARE PLANNING)
[address] [address] Clinical Specialist  Conversation Note      Date of ACP Conversation: 9/6/2023, 9/7/23    Conversation Conducted with: Patient with Decision Making Capacity    ACP Clinical Specialist: Katerine Roldan RN    Healthcare Decision Maker:     Current Designated Healthcare Decision Maker     Primary Decision Maker: Laura Taylor - Other Relative - [phone]    Secondary Decision Maker: Lizeth Mock - Child - [phone]       Today we documented Decision Maker(s) consistent with ACP documents on file. See note below regarding update on 9/7/23. Care Preferences    Hospitalization: \"If your health worsens and it becomes clear that your chance of recovery is unlikely, what would your preference be regarding hospitalization? \"    Choice:  [x] The patient wants hospitalization. [] The patient prefers comfort-focused treatment without hospitalization. Ventilation: \"If you were in your present state of health and suddenly became very ill and were unable to breathe on your own, what would your preference be about the use of a ventilator (breathing machine) if it were available to you? \"      If the patient would desire the use of ventilator (breathing machine), answer \"yes\". If not, \"no\": yes    \"If your health worsens and it becomes clear that your chance of recovery is unlikely, what would your preference be about the use of a ventilator (breathing machine) if it were available to you? \"     Would the patient desire the use of ventilator (breathing machine)?: No      Resuscitation  \"CPR works best to restart the heart when there is a sudden event, like a heart attack, in someone who is otherwise healthy. Unfortunately, CPR does not typically restart the heart for people who have serious health conditions or who are very sick. \"    \"In the event your heart stopped as a result of an underlying serious health condition, would you want attempts to be made to restart your

## 2023-09-07 ENCOUNTER — CLINICAL DOCUMENTATION (OUTPATIENT)
Dept: CASE MANAGEMENT | Age: 68
End: 2023-09-07

## 2023-09-11 ENCOUNTER — CLINICAL DOCUMENTATION (OUTPATIENT)
Dept: CASE MANAGEMENT | Age: 68
End: 2023-09-11

## 2023-09-11 DIAGNOSIS — E78.00 PURE HYPERCHOLESTEROLEMIA, UNSPECIFIED: ICD-10-CM

## 2023-09-11 DIAGNOSIS — E11.65 TYPE 2 DIABETES MELLITUS WITH HYPERGLYCEMIA, UNSPECIFIED WHETHER LONG TERM INSULIN USE (HCC): Primary | ICD-10-CM

## 2023-09-11 DIAGNOSIS — E78.2 MIXED HYPERLIPIDEMIA: ICD-10-CM

## 2023-09-12 LAB
ALBUMIN SERPL-MCNC: 4.2 G/DL (ref 3.9–4.9)
ALBUMIN/CREAT UR: 153 MG/G CREAT (ref 0–29)
ALBUMIN/GLOB SERPL: 1.7 {RATIO} (ref 1.2–2.2)
ALP SERPL-CCNC: 121 IU/L (ref 44–121)
ALT SERPL-CCNC: 34 IU/L (ref 0–32)
AST SERPL-CCNC: 19 IU/L (ref 0–40)
BILIRUB SERPL-MCNC: 0.4 MG/DL (ref 0–1.2)
BUN SERPL-MCNC: 50 MG/DL (ref 8–27)
BUN/CREAT SERPL: 22 (ref 12–28)
CALCIUM SERPL-MCNC: 9 MG/DL (ref 8.7–10.3)
CHLORIDE SERPL-SCNC: 99 MMOL/L (ref 96–106)
CHOLEST SERPL-MCNC: 138 MG/DL (ref 100–199)
CO2 SERPL-SCNC: 22 MMOL/L (ref 20–29)
CREAT SERPL-MCNC: 2.28 MG/DL (ref 0.57–1)
CREAT UR-MCNC: 101.4 MG/DL
EGFRCR SERPLBLD CKD-EPI 2021: 23 ML/MIN/1.73
GLOBULIN SER CALC-MCNC: 2.5 G/DL (ref 1.5–4.5)
GLUCOSE SERPL-MCNC: 499 MG/DL (ref 70–99)
HBA1C MFR BLD: 10 % (ref 4.8–5.6)
HDLC SERPL-MCNC: 69 MG/DL
IMP & REVIEW OF LAB RESULTS: NORMAL
LDLC SERPL CALC-MCNC: 39 MG/DL (ref 0–99)
MICROALBUMIN UR-MCNC: 155.1 UG/ML
POTASSIUM SERPL-SCNC: 4.4 MMOL/L (ref 3.5–5.2)
PROT SERPL-MCNC: 6.7 G/DL (ref 6–8.5)
REPORT: NORMAL
SODIUM SERPL-SCNC: 136 MMOL/L (ref 134–144)
TRIGL SERPL-MCNC: 188 MG/DL (ref 0–149)
VLDLC SERPL CALC-MCNC: 30 MG/DL (ref 5–40)

## 2023-09-13 ENCOUNTER — CLINICAL DOCUMENTATION (OUTPATIENT)
Dept: CASE MANAGEMENT | Age: 68
End: 2023-09-13

## 2023-09-14 ENCOUNTER — TRANSCRIBE ORDERS (OUTPATIENT)
Facility: HOSPITAL | Age: 68
End: 2023-09-14

## 2023-09-14 ENCOUNTER — HOSPITAL ENCOUNTER (OUTPATIENT)
Facility: HOSPITAL | Age: 68
Discharge: HOME | End: 2023-09-14
Payer: MEDICARE

## 2023-09-14 VITALS — BODY MASS INDEX: 36.14 KG/M2 | HEIGHT: 63 IN | WEIGHT: 204 LBS

## 2023-09-14 DIAGNOSIS — Z12.31 OTHER SCREENING MAMMOGRAM: Primary | ICD-10-CM

## 2023-09-14 DIAGNOSIS — Z12.31 OTHER SCREENING MAMMOGRAM: ICD-10-CM

## 2023-09-14 PROCEDURE — 77063 BREAST TOMOSYNTHESIS BI: CPT

## 2023-09-15 ENCOUNTER — OFFICE VISIT (OUTPATIENT)
Age: 68
End: 2023-09-15
Payer: MEDICARE

## 2023-09-15 VITALS
HEART RATE: 73 BPM | OXYGEN SATURATION: 97 % | WEIGHT: 201 LBS | TEMPERATURE: 98.8 F | SYSTOLIC BLOOD PRESSURE: 125 MMHG | BODY MASS INDEX: 35.61 KG/M2 | HEIGHT: 63 IN | RESPIRATION RATE: 18 BRPM | DIASTOLIC BLOOD PRESSURE: 68 MMHG

## 2023-09-15 DIAGNOSIS — E78.00 HYPERCHOLESTEROLEMIA: ICD-10-CM

## 2023-09-15 DIAGNOSIS — E11.21 TYPE 2 DIABETES MELLITUS WITH DIABETIC NEPHROPATHY, WITHOUT LONG-TERM CURRENT USE OF INSULIN (HCC): Primary | ICD-10-CM

## 2023-09-15 DIAGNOSIS — I10 PRIMARY HYPERTENSION: ICD-10-CM

## 2023-09-15 DIAGNOSIS — N18.4 CKD (CHRONIC KIDNEY DISEASE) STAGE 4, GFR 15-29 ML/MIN (HCC): ICD-10-CM

## 2023-09-15 PROCEDURE — 3078F DIAST BP <80 MM HG: CPT | Performed by: INTERNAL MEDICINE

## 2023-09-15 PROCEDURE — 3017F COLORECTAL CA SCREEN DOC REV: CPT | Performed by: INTERNAL MEDICINE

## 2023-09-15 PROCEDURE — 3074F SYST BP LT 130 MM HG: CPT | Performed by: INTERNAL MEDICINE

## 2023-09-15 PROCEDURE — G8399 PT W/DXA RESULTS DOCUMENT: HCPCS | Performed by: INTERNAL MEDICINE

## 2023-09-15 PROCEDURE — 1090F PRES/ABSN URINE INCON ASSESS: CPT | Performed by: INTERNAL MEDICINE

## 2023-09-15 PROCEDURE — 3046F HEMOGLOBIN A1C LEVEL >9.0%: CPT | Performed by: INTERNAL MEDICINE

## 2023-09-15 PROCEDURE — 99214 OFFICE O/P EST MOD 30 MIN: CPT | Performed by: INTERNAL MEDICINE

## 2023-09-15 PROCEDURE — 2022F DILAT RTA XM EVC RTNOPTHY: CPT | Performed by: INTERNAL MEDICINE

## 2023-09-15 PROCEDURE — 1036F TOBACCO NON-USER: CPT | Performed by: INTERNAL MEDICINE

## 2023-09-15 PROCEDURE — G8417 CALC BMI ABV UP PARAM F/U: HCPCS | Performed by: INTERNAL MEDICINE

## 2023-09-15 PROCEDURE — 1123F ACP DISCUSS/DSCN MKR DOCD: CPT | Performed by: INTERNAL MEDICINE

## 2023-09-15 PROCEDURE — G8427 DOCREV CUR MEDS BY ELIG CLIN: HCPCS | Performed by: INTERNAL MEDICINE

## 2023-09-15 RX ORDER — GLUCOSAMINE HCL/CHONDROITIN SU 500-400 MG
CAPSULE ORAL
Qty: 100 STRIP | Refills: 3 | Status: SHIPPED | OUTPATIENT
Start: 2023-09-15

## 2023-09-15 NOTE — PATIENT INSTRUCTIONS
Hold  Metformin 1 tab in AM     Glipizide 10 mg in AM and 10 mg before dinner.   If she has low blood glucose advised to decrease glipizide to half a tablet twice daily     Trulicity weekly

## 2023-09-15 NOTE — PROGRESS NOTES
Andre Cannon is a 79 y.o. female here for   Chief Complaint   Patient presents with    Diabetes       1. Have you been to the ER, urgent care clinic since your last visit? Hospitalized since your last visit? -no    2. Have you seen or consulted any other health care providers outside of the [address] [place] [address] Avenue since your last visit?   Include any pap smears or colon screening.-no
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
## 2023-10-06 ENCOUNTER — OFFICE VISIT (OUTPATIENT)
Age: 68
End: 2023-10-06
Payer: MEDICARE

## 2023-10-06 ENCOUNTER — CLINICAL DOCUMENTATION (OUTPATIENT)
Age: 68
End: 2023-10-06

## 2023-10-06 VITALS
HEIGHT: 63 IN | SYSTOLIC BLOOD PRESSURE: 129 MMHG | HEART RATE: 80 BPM | WEIGHT: 202.5 LBS | BODY MASS INDEX: 35.88 KG/M2 | DIASTOLIC BLOOD PRESSURE: 78 MMHG | OXYGEN SATURATION: 98 %

## 2023-10-06 DIAGNOSIS — G47.33 OSA (OBSTRUCTIVE SLEEP APNEA): Primary | ICD-10-CM

## 2023-10-06 PROCEDURE — G8484 FLU IMMUNIZE NO ADMIN: HCPCS | Performed by: SPECIALIST

## 2023-10-06 PROCEDURE — 3078F DIAST BP <80 MM HG: CPT | Performed by: SPECIALIST

## 2023-10-06 PROCEDURE — 3017F COLORECTAL CA SCREEN DOC REV: CPT | Performed by: SPECIALIST

## 2023-10-06 PROCEDURE — 99213 OFFICE O/P EST LOW 20 MIN: CPT | Performed by: SPECIALIST

## 2023-10-06 PROCEDURE — 1036F TOBACCO NON-USER: CPT | Performed by: SPECIALIST

## 2023-10-06 PROCEDURE — G8427 DOCREV CUR MEDS BY ELIG CLIN: HCPCS | Performed by: SPECIALIST

## 2023-10-06 PROCEDURE — 3074F SYST BP LT 130 MM HG: CPT | Performed by: SPECIALIST

## 2023-10-06 PROCEDURE — G8399 PT W/DXA RESULTS DOCUMENT: HCPCS | Performed by: SPECIALIST

## 2023-10-06 PROCEDURE — 1123F ACP DISCUSS/DSCN MKR DOCD: CPT | Performed by: SPECIALIST

## 2023-10-06 PROCEDURE — G8417 CALC BMI ABV UP PARAM F/U: HCPCS | Performed by: SPECIALIST

## 2023-10-06 PROCEDURE — 1090F PRES/ABSN URINE INCON ASSESS: CPT | Performed by: SPECIALIST

## 2023-10-06 ASSESSMENT — SLEEP AND FATIGUE QUESTIONNAIRES
HOW LIKELY ARE YOU TO NOD OFF OR FALL ASLEEP WHILE SITTING AND READING: 2
HOW LIKELY ARE YOU TO NOD OFF OR FALL ASLEEP WHEN YOU ARE A PASSENGER IN A CAR FOR AN HOUR WITHOUT A BREAK: 2
HOW LIKELY ARE YOU TO NOD OFF OR FALL ASLEEP WHILE SITTING QUIETLY AFTER LUNCH WITHOUT ALCOHOL: 3
HOW LIKELY ARE YOU TO NOD OFF OR FALL ASLEEP IN A CAR, WHILE STOPPED FOR A FEW MINUTES IN TRAFFIC: 2
HOW LIKELY ARE YOU TO NOD OFF OR FALL ASLEEP WHILE WATCHING TV: 3
HOW LIKELY ARE YOU TO NOD OFF OR FALL ASLEEP WHILE LYING DOWN TO REST IN THE AFTERNOON WHEN CIRCUMSTANCES PERMIT: 3
HOW LIKELY ARE YOU TO NOD OFF OR FALL ASLEEP WHILE SITTING INACTIVE IN A PUBLIC PLACE: 2
ESS TOTAL SCORE: 18
HOW LIKELY ARE YOU TO NOD OFF OR FALL ASLEEP WHILE SITTING AND TALKING TO SOMEONE: 1

## 2023-10-06 NOTE — PROGRESS NOTES
[address]  [address] [place] [address]  Dept: [phone] [address] [place], [address]  Tel.  [phone]  Fax. [place]  [place], Tena Enriquez  Tel.  [phone]  Fax. [phone] [facility], [address] [facility]  Tel.  [phone]  Fax. [phone]         Chief Complaint       Chief Complaint   Patient presents with    Sleep Problem     Follow up         HPI        Giselle Salvador is a 79 y.o. female seen for follow-up. She was evaluated with a sleep study which demonstrated 77 respiratory events occurred at which 47 hypopnea and 30 apnea (1 central, 21 obstructive). Overall AHI 31.9/h. Events more prominent in REM sleep with the REM-AHI of 85.3/h. Patient more prominently supine. Minimal SaO2 72%. During the second portion of the study CPAP employed. 16 respiratory events occurred at which 11 hypopnea and 5 apnea (4 central, 1 obstructive). Overall AHI 3.8/h. Patient supine. Minimal SaO2 83%. CPAP initiated at 4 cm and increased to 8 cm. At 8 cm CPAP: 124.5 minutes recorded of which 107.1 minutes spent asleep and 37 minutes in rem. AHI 3.9. Minimal SaO2 84%; baseline greater than 93%. CPAP at 8 cm: Set up date 6/7/2023. When previously seen reduced compliance. Techniques were reviewed to improve overall compliance. Compliance data downloaded and reviewed in detail with the patient today. During the past 30 days, CPAP used during 28 days with the average daily use of 7.3 hours. CMS compliance criteria 93%. AHI 1.1  per hour. Patient continues during daytime sleepiness. [place] Sleepiness Scale: 18.  She states that she continues taking tramadol.     Allergies   Allergen Reactions    Aspirin Hives     Tolerates ibuprofen and naproxen    Clindamycin Hives and Rash       No current facility-administered

## 2023-10-26 ENCOUNTER — OFFICE VISIT (OUTPATIENT)
Age: 68
End: 2023-10-26
Payer: MEDICARE

## 2023-10-26 VITALS
TEMPERATURE: 98 F | BODY MASS INDEX: 35.3 KG/M2 | RESPIRATION RATE: 18 BRPM | HEART RATE: 53 BPM | SYSTOLIC BLOOD PRESSURE: 101 MMHG | DIASTOLIC BLOOD PRESSURE: 53 MMHG | WEIGHT: 199.2 LBS | OXYGEN SATURATION: 87 % | HEIGHT: 63 IN

## 2023-10-26 DIAGNOSIS — E11.65 TYPE 2 DIABETES MELLITUS WITH HYPERGLYCEMIA, WITHOUT LONG-TERM CURRENT USE OF INSULIN (HCC): Primary | ICD-10-CM

## 2023-10-26 DIAGNOSIS — N18.4 CKD (CHRONIC KIDNEY DISEASE) STAGE 4, GFR 15-29 ML/MIN (HCC): ICD-10-CM

## 2023-10-26 DIAGNOSIS — I10 ESSENTIAL HYPERTENSION: ICD-10-CM

## 2023-10-26 DIAGNOSIS — I10 PRIMARY HYPERTENSION: ICD-10-CM

## 2023-10-26 DIAGNOSIS — E78.00 HYPERCHOLESTEROLEMIA: ICD-10-CM

## 2023-10-26 DIAGNOSIS — E11.65 TYPE 2 DIABETES MELLITUS WITH HYPERGLYCEMIA, UNSPECIFIED WHETHER LONG TERM INSULIN USE (HCC): ICD-10-CM

## 2023-10-26 DIAGNOSIS — E78.2 MIXED HYPERLIPIDEMIA: ICD-10-CM

## 2023-10-26 PROCEDURE — 3074F SYST BP LT 130 MM HG: CPT | Performed by: INTERNAL MEDICINE

## 2023-10-26 PROCEDURE — 1123F ACP DISCUSS/DSCN MKR DOCD: CPT | Performed by: INTERNAL MEDICINE

## 2023-10-26 PROCEDURE — G8484 FLU IMMUNIZE NO ADMIN: HCPCS | Performed by: INTERNAL MEDICINE

## 2023-10-26 PROCEDURE — G8399 PT W/DXA RESULTS DOCUMENT: HCPCS | Performed by: INTERNAL MEDICINE

## 2023-10-26 PROCEDURE — 3017F COLORECTAL CA SCREEN DOC REV: CPT | Performed by: INTERNAL MEDICINE

## 2023-10-26 PROCEDURE — G8427 DOCREV CUR MEDS BY ELIG CLIN: HCPCS | Performed by: INTERNAL MEDICINE

## 2023-10-26 PROCEDURE — 3046F HEMOGLOBIN A1C LEVEL >9.0%: CPT | Performed by: INTERNAL MEDICINE

## 2023-10-26 PROCEDURE — G8417 CALC BMI ABV UP PARAM F/U: HCPCS | Performed by: INTERNAL MEDICINE

## 2023-10-26 PROCEDURE — 1036F TOBACCO NON-USER: CPT | Performed by: INTERNAL MEDICINE

## 2023-10-26 PROCEDURE — 99214 OFFICE O/P EST MOD 30 MIN: CPT | Performed by: INTERNAL MEDICINE

## 2023-10-26 PROCEDURE — 2022F DILAT RTA XM EVC RTNOPTHY: CPT | Performed by: INTERNAL MEDICINE

## 2023-10-26 PROCEDURE — 3078F DIAST BP <80 MM HG: CPT | Performed by: INTERNAL MEDICINE

## 2023-10-26 PROCEDURE — 1090F PRES/ABSN URINE INCON ASSESS: CPT | Performed by: INTERNAL MEDICINE

## 2023-10-26 RX ORDER — PENICILLIN V POTASSIUM 250 MG/1
TABLET ORAL
COMMUNITY
Start: 2023-10-19

## 2023-10-26 RX ORDER — LORAZEPAM 2 MG/1
TABLET ORAL
COMMUNITY
Start: 2023-10-19

## 2023-10-26 RX ORDER — DULAGLUTIDE 3 MG/.5ML
INJECTION, SOLUTION SUBCUTANEOUS
Qty: 6 ML | Refills: 3 | Status: SHIPPED | OUTPATIENT
Start: 2023-10-26

## 2023-10-26 NOTE — PROGRESS NOTES
Giselle Salvador is a 79 y.o. female here for   Chief Complaint   Patient presents with    Diabetes       1. Have you been to the ER, urgent care clinic since your last visit? Hospitalized since your last visit? - no      2. Have you seen or consulted any other health care providers outside of the [address] [place] [address] Avenue since your last visit?   Include any pap smears or colon screening.-Dentist; 10/23/2023
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
## 2023-11-07 ENCOUNTER — OFFICE VISIT (OUTPATIENT)
Age: 68
End: 2023-11-07
Payer: MEDICAID

## 2023-11-07 DIAGNOSIS — G25.0 ESSENTIAL TREMOR: Primary | ICD-10-CM

## 2023-11-07 PROCEDURE — G8417 CALC BMI ABV UP PARAM F/U: HCPCS | Performed by: PSYCHIATRY & NEUROLOGY

## 2023-11-07 PROCEDURE — 3017F COLORECTAL CA SCREEN DOC REV: CPT | Performed by: PSYCHIATRY & NEUROLOGY

## 2023-11-07 PROCEDURE — G8484 FLU IMMUNIZE NO ADMIN: HCPCS | Performed by: PSYCHIATRY & NEUROLOGY

## 2023-11-07 PROCEDURE — 1090F PRES/ABSN URINE INCON ASSESS: CPT | Performed by: PSYCHIATRY & NEUROLOGY

## 2023-11-07 PROCEDURE — G8428 CUR MEDS NOT DOCUMENT: HCPCS | Performed by: PSYCHIATRY & NEUROLOGY

## 2023-11-07 PROCEDURE — 1123F ACP DISCUSS/DSCN MKR DOCD: CPT | Performed by: PSYCHIATRY & NEUROLOGY

## 2023-11-07 PROCEDURE — 1036F TOBACCO NON-USER: CPT | Performed by: PSYCHIATRY & NEUROLOGY

## 2023-11-07 PROCEDURE — 99214 OFFICE O/P EST MOD 30 MIN: CPT | Performed by: PSYCHIATRY & NEUROLOGY

## 2023-11-07 PROCEDURE — G8399 PT W/DXA RESULTS DOCUMENT: HCPCS | Performed by: PSYCHIATRY & NEUROLOGY

## 2023-11-07 RX ORDER — PROPRANOLOL HYDROCHLORIDE 80 MG/1
80 CAPSULE, EXTENDED RELEASE ORAL DAILY
Qty: 30 CAPSULE | Refills: 4 | Status: SHIPPED | OUTPATIENT
Start: 2023-11-07

## 2023-11-07 NOTE — PROGRESS NOTES
[facility] and [address] at [facility] at [address] [place] [address], [id number] [place] [address]   [phone]              No chief complaint on file. Current Outpatient Medications   Medication Sig Dispense Refill    penicillin v potassium (VEETID) 250 MG tablet TAKE 1 TABLET BY MOUTH FOUR TIMES DAILY UNTIL GONE      LORazepam (ATIVAN) 2 MG tablet TAKE 1 TABLET BY MOUTH 1 AND 1/2 HOURS BEFORE APPOINTMENT      Dulaglutide (TRULICITY) 3 CR/7.4TC SOPN INJECT 3 MG UNDER THE SKIN EVERY 7 DAYS, STOP 1.5 MG DOSE 6 mL 3    blood glucose monitor kit and supplies Use as directed to check BG 3 times daily. Dx code: E11.65 1 kit 0    blood glucose monitor strips Use as directed to check BG 1 time daily.  Dx code E11.65 100 strip 3    lidocaine (LIDODERM) 5 % UNWRAP AND APPLY 1 PATCH TO SKIN FOR UP 12 HOURS      Omega-3 Fatty Acids (FISH OIL PO) Take by mouth      amLODIPine-benazepril (LOTREL) 10-20 MG per capsule Take 1 capsule by mouth daily 90 capsule 3    hydroCHLOROthiazide (HYDRODIURIL) 25 MG tablet Take 1 tablet by mouth daily 90 tablet 3    clobetasol (TEMOVATE) 0.05 % cream  (Patient not taking: Reported on 9/15/2023)      rosuvastatin (CRESTOR) 20 MG tablet Take 1 tablet by mouth daily 90 tablet 3    glipiZIDE (GLUCOTROL) 10 MG tablet Take 1 tablet by mouth 2 times daily 180 tablet 3    valACYclovir (VALTREX) 1 g tablet Take 1 tablet by mouth 2 times daily (Patient not taking: Reported on 10/26/2023) 20 tablet 0    blood glucose test strips (ACCU-CHEK ELENO PLUS) strip Test once daily Dx Code: E11.65      Accu-Chek FastClix Lancets MISC Test once daily Dx Code: E11.65      primidone (MYSOLINE) 50 MG tablet  (Patient not taking: Reported on 10/26/2023)      nystatin (MYCOSTATIN) 172461 UNIT/GM cream Apply topically 2 times daily      polyethyl glycol-propyl glycol 0.4-0.3 %

## 2023-11-09 NOTE — PROGRESS NOTES
Chief Complaint   Patient presents with    [facility]     with pap     1. Have you been to the ER, urgent care clinic since your last visit? Hospitalized since your last visit? No    2. Have you seen or consulted any other health care providers outside of the [facility] since your last visit? Include any pap smears or colon screening.  No Yes... Yes... Yes... Yes... Yes... Yes... Yes... Yes...

## 2023-12-12 RX ORDER — PROPRANOLOL HCL 60 MG
60 CAPSULE, EXTENDED RELEASE 24HR ORAL NIGHTLY
Qty: 30 CAPSULE | OUTPATIENT
Start: 2023-12-12

## 2023-12-28 ENCOUNTER — HOSPITAL ENCOUNTER (OUTPATIENT)
Facility: HOSPITAL | Age: 68
Discharge: HOME | End: 2023-12-28
Attending: ANESTHESIOLOGY
Payer: MEDICAID

## 2023-12-28 DIAGNOSIS — M54.16 RADICULOPATHY, LUMBAR REGION: ICD-10-CM

## 2023-12-28 PROCEDURE — 72148 MRI LUMBAR SPINE W/O DYE: CPT

## 2024-01-04 PROBLEM — H25.012 CORTICAL AGE-RELATED CATARACT OF LEFT EYE: Status: ACTIVE | Noted: 2024-01-04

## 2024-02-03 DIAGNOSIS — G25.0 ESSENTIAL TREMOR: Primary | ICD-10-CM

## 2024-02-14 RX ORDER — PROPRANOLOL HYDROCHLORIDE 80 MG/1
80 CAPSULE, EXTENDED RELEASE ORAL DAILY
Qty: 30 CAPSULE | Refills: 1 | Status: SHIPPED | OUTPATIENT
Start: 2024-02-14

## 2024-03-11 ENCOUNTER — TELEPHONE (OUTPATIENT)
Age: 69
End: 2024-03-11

## 2024-03-11 NOTE — TELEPHONE ENCOUNTER
This writer attempted to contact patient in regards to concerns. Per Dr Wise patient needs an appointment. No answer left voicemail to return call and setup appointment.

## 2024-03-11 NOTE — TELEPHONE ENCOUNTER
Pt stated that she is experiencing vertigo and would like a medical opinion on taking Sudafed or Dramamine due to being allergic to Asprin. This writer encouraged pt to schedule an appt. She refused and stated that she has experienced this before and does not need to be seen. She is requesting a returned phone call for medical advice re: which OTC med she can take with her allergy.    Thank you

## 2024-03-13 DIAGNOSIS — E11.65 TYPE 2 DIABETES MELLITUS WITH HYPERGLYCEMIA, UNSPECIFIED WHETHER LONG TERM INSULIN USE (HCC): ICD-10-CM

## 2024-03-13 DIAGNOSIS — E78.2 MIXED HYPERLIPIDEMIA: Primary | ICD-10-CM

## 2024-03-14 ENCOUNTER — NURSE ONLY (OUTPATIENT)
Age: 69
End: 2024-03-14

## 2024-03-14 DIAGNOSIS — I10 PRIMARY HYPERTENSION: ICD-10-CM

## 2024-03-14 DIAGNOSIS — N18.4 CKD (CHRONIC KIDNEY DISEASE) STAGE 4, GFR 15-29 ML/MIN (HCC): ICD-10-CM

## 2024-03-14 DIAGNOSIS — E78.2 MIXED HYPERLIPIDEMIA: ICD-10-CM

## 2024-03-14 DIAGNOSIS — E11.65 TYPE 2 DIABETES MELLITUS WITH HYPERGLYCEMIA, UNSPECIFIED WHETHER LONG TERM INSULIN USE (HCC): ICD-10-CM

## 2024-03-14 DIAGNOSIS — E78.00 HYPERCHOLESTEROLEMIA: ICD-10-CM

## 2024-03-14 DIAGNOSIS — E11.65 TYPE 2 DIABETES MELLITUS WITH HYPERGLYCEMIA, WITHOUT LONG-TERM CURRENT USE OF INSULIN (HCC): ICD-10-CM

## 2024-03-14 LAB
ALBUMIN SERPL-MCNC: 3.5 G/DL (ref 3.5–5)
ALBUMIN/GLOB SERPL: 1.1 (ref 1.1–2.2)
ALP SERPL-CCNC: 102 U/L (ref 45–117)
ALT SERPL-CCNC: 30 U/L (ref 12–78)
ANION GAP SERPL CALC-SCNC: 6 MMOL/L (ref 5–15)
AST SERPL-CCNC: 18 U/L (ref 15–37)
BILIRUB SERPL-MCNC: 0.4 MG/DL (ref 0.2–1)
BUN SERPL-MCNC: 32 MG/DL (ref 6–20)
BUN/CREAT SERPL: 18 (ref 12–20)
CALCIUM SERPL-MCNC: 8.9 MG/DL (ref 8.5–10.1)
CHLORIDE SERPL-SCNC: 110 MMOL/L (ref 97–108)
CO2 SERPL-SCNC: 27 MMOL/L (ref 21–32)
CREAT SERPL-MCNC: 1.82 MG/DL (ref 0.55–1.02)
EST. AVERAGE GLUCOSE BLD GHB EST-MCNC: 214 MG/DL
GLOBULIN SER CALC-MCNC: 3.2 G/DL (ref 2–4)
GLUCOSE SERPL-MCNC: 203 MG/DL (ref 65–100)
HBA1C MFR BLD: 9.1 % (ref 4–5.6)
POTASSIUM SERPL-SCNC: 4.3 MMOL/L (ref 3.5–5.1)
PROT SERPL-MCNC: 6.7 G/DL (ref 6.4–8.2)
SODIUM SERPL-SCNC: 143 MMOL/L (ref 136–145)

## (undated) DEVICE — STRIP,CLOSURE,WOUND,MEDI-STRIP,1/2X4: Brand: MEDLINE

## (undated) DEVICE — DRAPE,REIN 53X77,STERILE: Brand: MEDLINE

## (undated) DEVICE — ROCKER SWITCH PENCIL BLADE ELECTRODE, HOLSTER: Brand: EDGE

## (undated) DEVICE — 3M™ TEGADERM™ TRANSPARENT FILM DRESSING FRAME STYLE, 1626W, 4 IN X 4-3/4 IN (10 CM X 12 CM), 50/CT 4CT/CASE: Brand: 3M™ TEGADERM™

## (undated) DEVICE — SOLIDIFIER MEDC 1200ML -- CONVERT TO 356117

## (undated) DEVICE — COVER LT HNDL PLAS RIG 1 PER PK

## (undated) DEVICE — NDL PRT INJ NSAF BLNT 18GX1.5 --

## (undated) DEVICE — SUTURE VCRL SZ 3-0 L27IN ABSRB UD L26MM SH 1/2 CIR J416H

## (undated) DEVICE — 1200 GUARD II KIT W/5MM TUBE W/O VAC TUBE: Brand: GUARDIAN

## (undated) DEVICE — SOL IRRIGATION INJ NACL 0.9% 500ML BTL

## (undated) DEVICE — KENDALL RADIOLUCENT FOAM MONITORING ELECTRODE -RECTANGULAR SHAPE: Brand: KENDALL

## (undated) DEVICE — CHEST PACK: Brand: MEDLINE INDUSTRIES, INC.

## (undated) DEVICE — TOWEL SURG W17XL27IN STD BLU COT NONFENESTRATED PREWASHED

## (undated) DEVICE — PAD,NON-ADHERENT,3X8,STERILE,LF,1/PK: Brand: MEDLINE

## (undated) DEVICE — DRAIN SURG 15FR L3/16IN DIA4.7MM SIL CHN RND FULL FLUT TRCR

## (undated) DEVICE — SUTURE MCRYL SZ 4-0 L27IN ABSRB UD L19MM PS-2 1/2 CIR PRIM Y426H

## (undated) DEVICE — 3M™ CUROS™ DISINFECTING CAP FOR NEEDLELESS CONNECTORS 270/CARTON 20 CARTONS/CASE CFF1-270: Brand: CUROS™

## (undated) DEVICE — (D)PREP SKN CHLRAPRP APPL 26ML -- CONVERT TO ITEM 371833

## (undated) DEVICE — STERILE POLYISOPRENE POWDER-FREE SURGICAL GLOVES: Brand: PROTEXIS

## (undated) DEVICE — NEEDLE HYPO 22GA L1.5IN BLK S STL HUB POLYPR SHLD REG BVL

## (undated) DEVICE — CATH IV AUTOGRD BC PNK 20GA 25 -- INSYTE

## (undated) DEVICE — Device

## (undated) DEVICE — CANN NASAL O2 CAPNOGRAPHY AD -- FILTERLINE

## (undated) DEVICE — BAG BELONG PT PERS CLEAR HANDL

## (undated) DEVICE — INFECTION CONTROL KIT SYS

## (undated) DEVICE — ADULT SPO2 SENSOR: Brand: NELLCOR

## (undated) DEVICE — STRAP,POSITIONING,KNEE/BODY,FOAM,4X60": Brand: MEDLINE

## (undated) DEVICE — KIT COLON W/ 1.1OZ LUB AND 2 END

## (undated) DEVICE — SET ADMIN 16ML TBNG L100IN 2 Y INJ SITE IV PIGGY BK DISP

## (undated) DEVICE — REM POLYHESIVE ADULT PATIENT RETURN ELECTRODE: Brand: VALLEYLAB

## (undated) DEVICE — SIMPLICITY FLUFF UNDERPAD 23X36, MODERATE: Brand: SIMPLICITY

## (undated) DEVICE — SYR 3ML LL TIP 1/10ML GRAD --